# Patient Record
Sex: FEMALE | Race: WHITE | Employment: OTHER | URBAN - METROPOLITAN AREA
[De-identification: names, ages, dates, MRNs, and addresses within clinical notes are randomized per-mention and may not be internally consistent; named-entity substitution may affect disease eponyms.]

---

## 2017-01-01 ENCOUNTER — COMMUNICATION - HEALTHEAST (OUTPATIENT)
Dept: FAMILY MEDICINE | Facility: CLINIC | Age: 67
End: 2017-01-01

## 2017-01-04 ENCOUNTER — COMMUNICATION - HEALTHEAST (OUTPATIENT)
Dept: CARDIOLOGY | Facility: CLINIC | Age: 67
End: 2017-01-04

## 2017-01-04 ENCOUNTER — SURGERY - HEALTHEAST (OUTPATIENT)
Dept: CARDIOLOGY | Facility: CLINIC | Age: 67
End: 2017-01-04

## 2017-01-04 ENCOUNTER — OFFICE VISIT - HEALTHEAST (OUTPATIENT)
Dept: CARDIOLOGY | Facility: CLINIC | Age: 67
End: 2017-01-04

## 2017-01-04 ENCOUNTER — COMMUNICATION - HEALTHEAST (OUTPATIENT)
Dept: FAMILY MEDICINE | Facility: CLINIC | Age: 67
End: 2017-01-04

## 2017-01-04 ENCOUNTER — AMBULATORY - HEALTHEAST (OUTPATIENT)
Dept: CARDIOLOGY | Facility: CLINIC | Age: 67
End: 2017-01-04

## 2017-01-04 DIAGNOSIS — E78.00 PURE HYPERCHOLESTEROLEMIA: ICD-10-CM

## 2017-01-04 DIAGNOSIS — I10 ESSENTIAL HYPERTENSION: ICD-10-CM

## 2017-01-04 DIAGNOSIS — I25.119 CORONARY ARTERY DISEASE INVOLVING NATIVE CORONARY ARTERY OF NATIVE HEART WITH ANGINA PECTORIS (H): ICD-10-CM

## 2017-01-04 ASSESSMENT — MIFFLIN-ST. JEOR: SCORE: 1070.6

## 2017-01-05 ENCOUNTER — SURGERY - HEALTHEAST (OUTPATIENT)
Dept: CARDIOLOGY | Facility: CLINIC | Age: 67
End: 2017-01-05

## 2017-01-05 ASSESSMENT — MIFFLIN-ST. JEOR: SCORE: 1070.6

## 2017-01-09 ENCOUNTER — OFFICE VISIT - HEALTHEAST (OUTPATIENT)
Dept: FAMILY MEDICINE | Facility: CLINIC | Age: 67
End: 2017-01-09

## 2017-01-09 DIAGNOSIS — R94.39 ABNORMAL STRESS TEST: ICD-10-CM

## 2017-01-09 DIAGNOSIS — I10 ESSENTIAL HYPERTENSION: ICD-10-CM

## 2017-01-09 DIAGNOSIS — K21.9 GASTROESOPHAGEAL REFLUX DISEASE, ESOPHAGITIS PRESENCE NOT SPECIFIED: ICD-10-CM

## 2017-01-09 DIAGNOSIS — G52.1 GLOSSOPHARYNGEAL NEURALGIA: ICD-10-CM

## 2017-01-09 DIAGNOSIS — E78.00 PURE HYPERCHOLESTEROLEMIA: ICD-10-CM

## 2017-01-09 ASSESSMENT — MIFFLIN-ST. JEOR: SCORE: 1057

## 2017-01-16 ENCOUNTER — RECORDS - HEALTHEAST (OUTPATIENT)
Dept: ADMINISTRATIVE | Facility: OTHER | Age: 67
End: 2017-01-16

## 2017-01-20 ENCOUNTER — COMMUNICATION - HEALTHEAST (OUTPATIENT)
Dept: ADMINISTRATIVE | Facility: CLINIC | Age: 67
End: 2017-01-20

## 2017-01-26 ENCOUNTER — OFFICE VISIT - HEALTHEAST (OUTPATIENT)
Dept: FAMILY MEDICINE | Facility: CLINIC | Age: 67
End: 2017-01-26

## 2017-01-26 ENCOUNTER — COMMUNICATION - HEALTHEAST (OUTPATIENT)
Dept: FAMILY MEDICINE | Facility: CLINIC | Age: 67
End: 2017-01-26

## 2017-01-26 DIAGNOSIS — G52.1 GLOSSOPHARYNGEAL NEURALGIA: ICD-10-CM

## 2017-01-26 DIAGNOSIS — K21.9 ESOPHAGEAL REFLUX: ICD-10-CM

## 2017-01-26 DIAGNOSIS — E03.9 HYPOTHYROID: ICD-10-CM

## 2017-01-26 DIAGNOSIS — I10 ESSENTIAL HYPERTENSION, BENIGN: ICD-10-CM

## 2017-01-26 ASSESSMENT — MIFFLIN-ST. JEOR: SCORE: 1075.14

## 2017-02-09 ENCOUNTER — COMMUNICATION - HEALTHEAST (OUTPATIENT)
Dept: FAMILY MEDICINE | Facility: CLINIC | Age: 67
End: 2017-02-09

## 2017-02-10 ENCOUNTER — RECORDS - HEALTHEAST (OUTPATIENT)
Dept: ADMINISTRATIVE | Facility: OTHER | Age: 67
End: 2017-02-10

## 2017-02-14 ENCOUNTER — COMMUNICATION - HEALTHEAST (OUTPATIENT)
Dept: FAMILY MEDICINE | Facility: CLINIC | Age: 67
End: 2017-02-14

## 2017-02-14 DIAGNOSIS — K21.9 GASTROESOPHAGEAL REFLUX DISEASE WITHOUT ESOPHAGITIS: ICD-10-CM

## 2017-02-16 ENCOUNTER — COMMUNICATION - HEALTHEAST (OUTPATIENT)
Dept: FAMILY MEDICINE | Facility: CLINIC | Age: 67
End: 2017-02-16

## 2017-02-16 DIAGNOSIS — K21.9 GASTROESOPHAGEAL REFLUX DISEASE WITHOUT ESOPHAGITIS: ICD-10-CM

## 2017-02-18 ENCOUNTER — AMBULATORY - HEALTHEAST (OUTPATIENT)
Dept: FAMILY MEDICINE | Facility: CLINIC | Age: 67
End: 2017-02-18

## 2017-02-27 ENCOUNTER — OFFICE VISIT - HEALTHEAST (OUTPATIENT)
Dept: FAMILY MEDICINE | Facility: CLINIC | Age: 67
End: 2017-02-27

## 2017-02-27 ENCOUNTER — COMMUNICATION - HEALTHEAST (OUTPATIENT)
Dept: FAMILY MEDICINE | Facility: CLINIC | Age: 67
End: 2017-02-27

## 2017-02-27 DIAGNOSIS — E03.9 HYPOTHYROIDISM: ICD-10-CM

## 2017-02-27 ASSESSMENT — MIFFLIN-ST. JEOR: SCORE: 1102.36

## 2017-02-28 ENCOUNTER — COMMUNICATION - HEALTHEAST (OUTPATIENT)
Dept: FAMILY MEDICINE | Facility: CLINIC | Age: 67
End: 2017-02-28

## 2017-03-06 ENCOUNTER — OFFICE VISIT (OUTPATIENT)
Dept: OPHTHALMOLOGY | Facility: CLINIC | Age: 67
End: 2017-03-06
Attending: OPHTHALMOLOGY
Payer: MEDICARE

## 2017-03-06 ENCOUNTER — RECORDS - HEALTHEAST (OUTPATIENT)
Dept: ADMINISTRATIVE | Facility: OTHER | Age: 67
End: 2017-03-06

## 2017-03-06 DIAGNOSIS — H01.00A BLEPHARITIS OF UPPER AND LOWER EYELIDS OF BOTH EYES, UNSPECIFIED TYPE: ICD-10-CM

## 2017-03-06 DIAGNOSIS — H01.00B BLEPHARITIS OF UPPER AND LOWER EYELIDS OF BOTH EYES, UNSPECIFIED TYPE: ICD-10-CM

## 2017-03-06 DIAGNOSIS — H11.823 CONJUNCTIVOCHALASIS, BILATERAL: Primary | ICD-10-CM

## 2017-03-06 PROCEDURE — 99215 OFFICE O/P EST HI 40 MIN: CPT | Mod: ZF

## 2017-03-06 RX ORDER — DOXYCYCLINE 100 MG/1
1 CAPSULE ORAL DAILY
Qty: 30 CAPSULE | Refills: 3 | Status: SHIPPED | OUTPATIENT
Start: 2017-03-06 | End: 2018-01-23

## 2017-03-06 ASSESSMENT — TONOMETRY
OD_IOP_MMHG: 14
IOP_METHOD: ICARE
OS_IOP_MMHG: 12

## 2017-03-06 ASSESSMENT — REFRACTION_WEARINGRX
OS_SPHERE: -1.25
OD_HPRISM: 3 BI
SPECS_TYPE: PAL
OD_ADD: +2.50
OS_AXIS: 168
OS_ADD: +2.50
OS_CYLINDER: +1.25
OD_AXIS: 013
OD_SPHERE: -1.00
OD_CYLINDER: +1.50
OS_HPRISM: 3 BI

## 2017-03-06 ASSESSMENT — VISUAL ACUITY
CORRECTION_TYPE: GLASSES
METHOD: SNELLEN - LINEAR
OS_CC: 20/25
OD_CC: 20/20
OS_CC+: -3
OD_CC+: -1

## 2017-03-06 ASSESSMENT — CONF VISUAL FIELD
OD_NORMAL: 1
OS_NORMAL: 1

## 2017-03-06 NOTE — MR AVS SNAPSHOT
After Visit Summary   3/6/2017    Lissa Lucero    MRN: [id number]           Patient Information     Date Of Birth          1950        Visit Information        Provider Department      3/6/2017 9:45 AM Maik Cuello MD [facility]        Today's Diagnoses     Conjunctivochalasis, bilateral    -  1    Blepharitis of upper and lower eyelids of both eyes, unspecified type           Follow-ups after your visit        Your next 10 appointments already scheduled     Apr 12, 2017  9:45 AM CDT   Post-Op with Maik Cuello MD   Eye Clinic ([facility])    [address]  [address]  [facility] [address]   [phone]            Apr 19, 2017 10:30 AM CDT   Post-Op with Maik Cuello MD   Eye Clinic ([facility])    [address]  [address] [facility]  [address] [address]  [facility] [address]   [phone]              Who to contact     Please call your clinic at [phone] to:    Ask questions about your health    Make or cancel appointments    Discuss your medicines    Learn about your test results    Speak to your doctor   If you have compliments or concerns about an experience at your clinic, or if you wish to file a complaint, please contact [facility] Patient Relations at [phone] or email us at [email or web address]         Additional Information About Your Visit        MyChart Information     GRAVIDIt is an electronic gateway that provides easy, online access to your medical records. With Philo, you can request a clinic appointment, read your test results, renew a prescription or communicate with your care team.     To sign up for GRAVIDIt visit the website at [email or web address]   You will be asked to enter the access code listed below, as well as some personal information. Please follow the directions to create your username and password.     Your access code is:  [email or web address]  Expires: 2017  9:30 AM     Your access code will  in 90 days. If you need help or a new code, please contact your [facility] or call [phone] for assistance.        Care EveryWhere ID     This is your Care EveryWhere ID. This could be used by other organizations to access your [place] medical records  [id number]         Blood Pressure from Last 3 Encounters:   16 118/58   09/14/15 132/66    Weight from Last 3 Encounters:   No data found for Wt              We Performed the Following     Sarai-Operative Worksheet (Ant Seg)          Today's Medication Changes          These changes are accurate as of: 3/6/17 11:59 PM.  If you have any questions, ask your nurse or doctor.               Start taking these medicines.        Dose/Directions    doxycycline Monohydrate 100 MG Caps   Used for:  Conjunctivochalasis, bilateral, Blepharitis of upper and lower eyelids of both eyes, unspecified type   Started by:  Maik Cuello MD        Dose:  1 capsule   Take 1 capsule (100 mg) by mouth daily   Quantity:  30 capsule   Refills:  3         Stop taking these medicines if you haven't already. Please contact your care team if you have questions.     PREVACID PO   Stopped by:  Maik Cuello MD                Where to get your medicines      Some of these will need a paper prescription and others can be bought over the counter.  Ask your nurse if you have questions.     Bring a paper prescription for each of these medications     doxycycline Monohydrate 100 MG Caps                Primary Care Provider Office Phone # Fax #    Linsey Gutiérrez [phone]       [facility] [place] [address] [place] [address]        Thank you!     Thank you for choosing EYE CLINIC  for your care. Our goal is always to provide you with excellent care. Hearing back from our patients is one way we can continue to improve our services. Please  take a few minutes to complete the written survey that you may receive in the mail after your visit with us. Thank you!             Your Updated Medication List - Protect others around you: Learn how to safely use, store and throw away your medicines at www.disposemymeds.org.          This list is accurate as of: 3/6/17 11:59 PM.  Always use your most recent med list.                   Brand Name Dispense Instructions for use    ACETAMINOPHEN PO      Take 325 mg by mouth every 4 hours as needed for pain       ASPIRIN PO      Take 81 mg by mouth daily       doxycycline Monohydrate 100 MG Caps     30 capsule    Take 1 capsule (100 mg) by mouth daily       ESTROPIPATE PO      Take 0.75 mg by mouth daily       FLEXERIL PO      Take 10 mg by mouth as needed for muscle spasms       gabapentin 600 MG tablet    NEURONTIN     Take 1,200 mg by mouth 3 times daily       levothyroxine 112 MCG tablet    SYNTHROID/LEVOTHROID     Take 112 mcg by mouth daily       lidocaine 2 % topical gel    XYLOCAINE     Apply topically as needed for moderate pain       LUTEIN 20 PO      Take by mouth daily       METOPROLOL TARTRATE PO      Take 25 mg by mouth       MULTIVITAMIN PO      Take by mouth daily       NIFEDICAL XL 30 MG 24 hr tablet   Generic drug:  NIFEdipine ER osmotic      Take 30 mg by mouth daily       pantoprazole 40 MG EC tablet    PROTONIX     Take by mouth daily       SIMVASTATIN PO      Take 20 mg by mouth At Bedtime       ZANTAC PO      Take 150 mg by mouth daily

## 2017-03-06 NOTE — LETTER
"3/6/2017       RE: Lissa Lucero  [address] [facility] [place] [address]     Dear Colleague,    Thank you for referring your patient, Lissa Lucero, to the EYE CLINIC at [facility]. Please see a copy of my visit note below.    CC: Referral from Dr. Chau for conjunctivochalasis    Watery eyes and eyes are blurry. Tried Restasis for a couple months in August and that didn't help. Then tried Xiidra and Regenee and neither has helped. However only used Xiidra for 1 week. States that all tear drops only give relief for 30 sec and then its irritated again. C/o pooling of tears in both eyes.     Ocular Medications: Ultra Smoothe prn OU    Patient Ocular History:Sicca, IOL OU, YAG OU, Hx Plugs    A/P:    1. Conjunctiva disorder   2. Epiphora, bilateral   3. Dry eyes   4. Punctate keratitis of both eyes       Plan:     Pt has tried numerous eye drops without relief, although Xiidra course was quite short. Pt was on Regeneret? Eye drops from referring optometrist which cost \"$170 for a bottle.\" At this time her punctum appear patent, good tear film height, with evidence for ocular surface disorder, so I will defer irrigation of NLD at this time  Could consider plasma filtered drops, from [facility]  I rec referral to [facility] for eval of conj redundancy  Pt to use warm compresses twice daily at least to eyelids for 3-5 min duration. Pt also instructed to use an eyelid scrub and to clean eyelids with a q tip or lint-free pad at least Once daily. Pt to use artificial tears Four times a day. I also rec using fish oil supplements. Pt states she is allergic to fish oil PILLS, so I rec increased fish in diet (which she says she can take). Pt advised of chronicity of this condition and need for continued self treatment as directed by the physician.      CC: Referral for conjunctivohalasis both eyes    HPI: 66yo F referred by Dr. Josey Chau  Tears pool in the lower lateral " eyelids  Worsening symptoms with more itchiness, tearing, irritation, burning, blurry vision, occasional redness, crusting. No diplopia, flashes, floaters.  Tried Restasis, Xiidra, no help  Drops don't seem to stay in her eyes, don't seem to help    A/P:    1. Moderate conjunctivochalasis both eyes  2. Posterior blepharitis both eyes  3. Pseudophakia both eyes    Discussed R/B/A of conjunctival excision both eyes with cautery, patient wishes to proceed  Start Doxycycline 100mg po daily  Warm compresses, lid hygiene  Continue preservative artificial tears  Start flax seed oil for blepharitis    ~~~~~~~~~~~~~~~~~~~~~~~~~~~~~~~~~~~~~~~~~~~~~~~~~~~~~~~~~~~~~~~~      Again, thank you for allowing me to participate in the care of your patient.      Sincerely,    Maik Cuello MD

## 2017-03-06 NOTE — NURSING NOTE
"Chief Complaints and History of Present Illnesses   Patient presents with     Consult For     referred by Josey Chau ([facility]) for dry eyes     HPI    Affected eye(s):  Both   Symptoms:     Tearing (Comment: pt describes \"puddling\" in the lower lids)   Dryness         Do you have eye pain now?:  No      Comments:  Pt here for consult for dry eyes - referred by Dr. Josey Chau - María notes avail. in Care Everywhere  Pt notes an irritation upon awakening - \"eyes feel tight\"  Pt was told that her tear ducts weren't open and was told to see Dr. Goel in [place]    Pt has tried Restasis, Xiidra, and Regenerate  Pt uses preservative-free ultra soothee drops but doesn't feel that these help    Sasha DE SOUZA 9:52 AM March 6, 2017              "

## 2017-03-06 NOTE — Clinical Note
"3/6/2017       RE: Lissa Lucero  [address] [facility] [place] [address]     Dear Colleague,    Thank you for referring your patient, Lissa Lucero, to the EYE CLINIC at [facility]. Please see a copy of my visit note below.    CC: Referral from Dr. Chau for conjunctivochalasis    Watery eyes and eyes are blurry. Tried Restasis for a couple months in August and that didn't help. Then tried Xiidra and Regenee and neither has helped. However only used Xiidra for 1 week. States that all tear drops only give relief for 30 sec and then its irritated again. C/o pooling of tears in both eyes.     Ocular Medications: Ultra Smoothe prn OU    Patient Ocular History:Sicca, IOL OU, YAG OU, Hx Plugs    A/P:    1. Conjunctiva disorder   2. Epiphora, bilateral   3. Dry eyes   4. Punctate keratitis of both eyes       Plan:     Pt has tried numerous eye drops without relief, although Xiidra course was quite short. Pt was on Regeneret? Eye drops from referring optometrist which cost \"$170 for a bottle.\" At this time her punctum appear patent, good tear film height, with evidence for ocular surface disorder, so I will defer irrigation of NLD at this time  Could consider plasma filtered drops, from [facility]  I rec referral to [facility] for eval of conj redundancy  Pt to use warm compresses twice daily at least to eyelids for 3-5 min duration. Pt also instructed to use an eyelid scrub and to clean eyelids with a q tip or lint-free pad at least Once daily. Pt to use artificial tears Four times a day. I also rec using fish oil supplements. Pt states she is allergic to fish oil PILLS, so I rec increased fish in diet (which she says she can take). Pt advised of chronicity of this condition and need for continued self treatment as directed by the physician.      CC: Referral for conjunctivohalasis both eyes    Tears pool in the lower lateral eyelids  Worsening symptoms  Tried Restasis, " Xiidra, no help  Drops don't seem to stay in her eyes    A/P:    1. Mild conjunctivochalasis both eyes  2. Posterior blepharitis both eyes  3. Pseudophakia both eyes    May consider conjunctival excision both eyes  May consider doxycycline 100 mg daily  Warm compresses, lid hygiene           Again, thank you for allowing me to participate in the care of your patient.      Sincerely,    Maik Cuello MD

## 2017-03-06 NOTE — PROGRESS NOTES
"CC: Referral from Dr. Chau for conjunctivochalasis    Watery eyes and eyes are blurry. Tried Restasis for a couple months in August and that didn't help. Then tried Xiidra and Regenee and neither has helped. However only used Xiidra for 1 week. States that all tear drops only give relief for 30 sec and then its irritated again. C/o pooling of tears in both eyes.     Ocular Medications: Ultra Smoothe prn OU    Patient Ocular History:Sicca, IOL OU, YAG OU, Hx Plugs    A/P:    1. Conjunctiva disorder   2. Epiphora, bilateral   3. Dry eyes   4. Punctate keratitis of both eyes       Plan:     Pt has tried numerous eye drops without relief, although Xiidra course was quite short. Pt was on Regeneret? Eye drops from referring optometrist which cost \"$170 for a bottle.\" At this time her punctum appear patent, good tear film height, with evidence for ocular surface disorder, so I will defer irrigation of NLD at this time  Could consider plasma filtered drops, from [facility]  I rec referral to [facility] for eval of conj redundancy  Pt to use warm compresses twice daily at least to eyelids for 3-5 min duration. Pt also instructed to use an eyelid scrub and to clean eyelids with a q tip or lint-free pad at least Once daily. Pt to use artificial tears Four times a day. I also rec using fish oil supplements. Pt states she is allergic to fish oil PILLS, so I rec increased fish in diet (which she says she can take). Pt advised of chronicity of this condition and need for continued self treatment as directed by the physician.    "

## 2017-03-06 NOTE — PROGRESS NOTES
CC: Referral for conjunctivohalasis both eyes    HPI: 66yo F referred by Dr. Josey Chau  Tears pool in the lower lateral eyelids  Worsening symptoms with more itchiness, tearing, irritation, burning, blurry vision, occasional redness, crusting. No diplopia, flashes, floaters.  Tried Restasis, Xiidra, no help  Drops don't seem to stay in her eyes, don't seem to help    A/P:    1. Moderate conjunctivochalasis both eyes  2. Posterior blepharitis both eyes  3. Pseudophakia both eyes    Discussed R/B/A of conjunctival excision both eyes with cautery, patient wishes to proceed  Start Doxycycline 100mg po daily  Warm compresses, lid hygiene  Continue preservative artificial tears  Start flax seed oil for blepharitis    ~~~~~~~~~~~~~~~~~~~~~~~~~~~~~~~~~~~~~~~~~~~~~~~~~~~~~~~~~~~~~~~~    Complete documentation of historical and exam elements from today's encounter can be found in the full encounter summary report (not reduplicated in this progress note). I personally obtained the chief complaint(s) and history of present illness.  I confirmed and edited as necessary the review of systems, past medical/surgical history, family history, social history, and examination findings as documented by others; and I examined the patient myself. I personally reviewed the relevant tests, images, and reports as documented above. I formulated and edited as necessary the assessment and plan and discussed the findings and management plan with the patient and family.    Maik Cuello MD

## 2017-03-21 ENCOUNTER — COMMUNICATION - HEALTHEAST (OUTPATIENT)
Dept: ADMINISTRATIVE | Facility: CLINIC | Age: 67
End: 2017-03-21

## 2017-03-30 ENCOUNTER — OFFICE VISIT - HEALTHEAST (OUTPATIENT)
Dept: FAMILY MEDICINE | Facility: CLINIC | Age: 67
End: 2017-03-30

## 2017-03-30 DIAGNOSIS — I10 ESSENTIAL HYPERTENSION, BENIGN: ICD-10-CM

## 2017-03-30 DIAGNOSIS — H11.829 CONJUNCTIVOCHALASIS: ICD-10-CM

## 2017-03-30 DIAGNOSIS — G52.1 GLOSSOPHARYNGEAL NEURALGIA: ICD-10-CM

## 2017-03-30 DIAGNOSIS — Z01.818 PREOP EXAMINATION: ICD-10-CM

## 2017-03-30 ASSESSMENT — MIFFLIN-ST. JEOR: SCORE: 1129.57

## 2017-04-06 ENCOUNTER — OFFICE VISIT - HEALTHEAST (OUTPATIENT)
Dept: PODIATRY | Age: 67
End: 2017-04-06

## 2017-04-06 ENCOUNTER — COMMUNICATION - HEALTHEAST (OUTPATIENT)
Dept: FAMILY MEDICINE | Facility: CLINIC | Age: 67
End: 2017-04-06

## 2017-04-06 DIAGNOSIS — M19.079 ARTHRITIS, MIDFOOT: ICD-10-CM

## 2017-04-06 ASSESSMENT — MIFFLIN-ST. JEOR: SCORE: 1129.57

## 2017-04-11 ENCOUNTER — TRANSFERRED RECORDS (OUTPATIENT)
Dept: HEALTH INFORMATION MANAGEMENT | Facility: CLINIC | Age: 67
End: 2017-04-11

## 2017-04-12 ENCOUNTER — OFFICE VISIT (OUTPATIENT)
Dept: OPHTHALMOLOGY | Facility: CLINIC | Age: 67
End: 2017-04-12
Attending: OPHTHALMOLOGY
Payer: MEDICARE

## 2017-04-12 DIAGNOSIS — H11.823 CONJUNCTIVOCHALASIS, BILATERAL: Primary | ICD-10-CM

## 2017-04-12 DIAGNOSIS — H01.00B BLEPHARITIS OF UPPER AND LOWER EYELIDS OF BOTH EYES, UNSPECIFIED TYPE: ICD-10-CM

## 2017-04-12 DIAGNOSIS — H01.00A BLEPHARITIS OF UPPER AND LOWER EYELIDS OF BOTH EYES, UNSPECIFIED TYPE: ICD-10-CM

## 2017-04-12 PROCEDURE — 99212 OFFICE O/P EST SF 10 MIN: CPT | Mod: ZF

## 2017-04-12 ASSESSMENT — VISUAL ACUITY
OD_CC+: -2
CORRECTION_TYPE: GLASSES
OS_CC: 20/50
OD_CC: 20/25
METHOD: SNELLEN - LINEAR

## 2017-04-12 ASSESSMENT — TONOMETRY
IOP_METHOD: ICARE
OD_IOP_MMHG: 10
OS_IOP_MMHG: 11

## 2017-04-12 NOTE — NURSING NOTE
Chief Complaints and History of Present Illnesses   Patient presents with     Follow Up For     S/P Conjunctival Excision BE 1 day     HPI    Last Eye Exam:  3/6/17   Affected eye(s):  Both   Symptoms:     Blurred vision   Floaters (Comment: Noticed a few wavy lines in vision this morning, cleared up after the matter in eyes cleared up. )   No flashes   Redness   Itching   Photophobia   Eye discharge      Duration:  1 day      Do you have eye pain now?:  Yes   Location:  OU   Pain Level:  Severe Pain (7), Extreme Pain (8)   Other:  Pressure feeling with a bad head ache,   Pain Frequency:  Intermittent      Comments:  Pt slept well last night. In some pain and discomfort today, would rate it a 7-8 today. Complains of a pressure feeling. Vision is blurry today. MD please review post operative instructions with pt today. MAHESH SHEA, COA 10:07 AM 04/12/2017

## 2017-04-12 NOTE — MR AVS SNAPSHOT
After Visit Summary   2017    Lissa Lucero    MRN: [id number]           Patient Information     Date Of Birth          1950        Visit Information        Provider Department      2017 9:45 AM Maik Cuello MD [facility]        Today's Diagnoses     Conjunctivochalasis, bilateral    -  1    Blepharitis of upper and lower eyelids of both eyes, unspecified type           Follow-ups after your visit        Your next 10 appointments already scheduled     2017 10:30 AM CDT   Post-Op with Maik Cuello MD   Eye Clinic ([facility])    [address]  [address] [facility]  [address] Clin [address]  [facility] [address] [phone]              Who to contact     Please call your clinic at [phone] to:    Ask questions about your health    Make or cancel appointments    Discuss your medicines    Learn about your test results    Speak to your doctor   If you have compliments or concerns about an experience at your clinic, or if you wish to file a complaint, please contact [facility] Patient Relations at [phone] or email us at [email or web address]         Additional Information About Your Visit        MyChart Information     Swing by Swinghart is an electronic gateway that provides easy, online access to your medical records. With Axentis Software, you can request a clinic appointment, read your test results, renew a prescription or communicate with your care team.     To sign up for KidAdmitt visit the website at [email or web address]   You will be asked to enter the access code listed below, as well as some personal information. Please follow the directions to create your username and password.     Your access code is: [email or web address]  Expires: 2017  9:30 AM     Your access code will  in 90 days. If you need help or a new code, please contact your [facility] or call [phone] for  assistance.        Care EveryWhere ID     This is your Care EveryWhere ID. This could be used by other organizations to access your [place] medical records  [id number]         Blood Pressure from Last 3 Encounters:   01/07/16 118/58   09/14/15 132/66    Weight from Last 3 Encounters:   No data found for Wt              Today, you had the following     No orders found for display       Primary Care Provider Office Phone # Fax #    Linsey Gutiérrez [phone]       [facility] [place] [address] [place] [address]        Thank you!     Thank you for choosing EYE CLINIC  for your care. Our goal is always to provide you with excellent care. Hearing back from our patients is one way we can continue to improve our services. Please take a few minutes to complete the written survey that you may receive in the mail after your visit with us. Thank you!             Your Updated Medication List - Protect others around you: Learn how to safely use, store and throw away your medicines at www.disposemymeds.org.          This list is accurate as of: 4/12/17 11:55 AM.  Always use your most recent med list.                   Brand Name Dispense Instructions for use    ACETAMINOPHEN PO      Take 325 mg by mouth every 4 hours as needed for pain       ASPIRIN PO      Take 81 mg by mouth daily       doxycycline Monohydrate 100 MG Caps     30 capsule    Take 1 capsule (100 mg) by mouth daily       ESTROPIPATE PO      Take 0.75 mg by mouth daily       FLEXERIL PO      Take 10 mg by mouth as needed for muscle spasms       gabapentin 600 MG tablet    NEURONTIN     Take 1,200 mg by mouth 3 times daily       levothyroxine 112 MCG tablet    SYNTHROID/LEVOTHROID     Take 112 mcg by mouth daily       lidocaine 2 % topical gel    XYLOCAINE     Apply topically as needed for moderate pain       LUTEIN 20 PO      Take by mouth daily       METOPROLOL TARTRATE PO      Take 25 mg by mouth       MULTIVITAMIN PO       Take by mouth daily       NIFEDICAL XL 30 MG 24 hr tablet   Generic drug:  NIFEdipine ER osmotic      Take 30 mg by mouth daily       pantoprazole 40 MG EC tablet    PROTONIX     Take by mouth daily       SIMVASTATIN PO      Take 20 mg by mouth At Bedtime       ZANTAC PO      Take 150 mg by mouth daily

## 2017-04-12 NOTE — PROGRESS NOTES
CC: Referral for conjunctivohalasis both eyes    HPI: 68yo F referred by Dr. Josey Chau evaluated for dryness and conjunctivochalasis.  Tried Restasis, Xiidra, no help  Drops don't seem to stay in her eyes, don't seem to help    Now POD#1 s/p Conjunctivochalasis excision OU    POHx:  S/p conjunctivochalasis excision OU 4/11/17    A/P:    1. Moderate conjunctivochalasis both eyes s/p excision OU  - doing well  - continue PF AT Q1H OU  - eye shields at night  - PF QID OU  - Ofloxacin QID OU  - edu post-op precautions  - continue flax seed oil  - f/u 1 week    2. Posterior blepharitis both eyes    3. Pseudophakia both eyes    ~~~~~~~~~~~~~~~~~~~~~~~~~~~~~~~~~~~~~~~~~~~~~~~~~~~~~~~~~~~~~~~~    Complete documentation of historical and exam elements from today's encounter can be found in the full encounter summary report (not reduplicated in this progress note). I personally obtained the chief complaint(s) and history of present illness.  I confirmed and edited as necessary the review of systems, past medical/surgical history, family history, social history, and examination findings as documented by others; and I examined the patient myself. I personally reviewed the relevant tests, images, and reports as documented above. I formulated and edited as necessary the assessment and plan and discussed the findings and management plan with the patient and family.    Maik Cuello MD

## 2017-04-19 ENCOUNTER — OFFICE VISIT (OUTPATIENT)
Dept: OPHTHALMOLOGY | Facility: CLINIC | Age: 67
End: 2017-04-19
Attending: OPHTHALMOLOGY
Payer: MEDICARE

## 2017-04-19 ENCOUNTER — TELEPHONE (OUTPATIENT)
Dept: OPHTHALMOLOGY | Facility: CLINIC | Age: 67
End: 2017-04-19

## 2017-04-19 DIAGNOSIS — H11.823 CONJUNCTIVOCHALASIS, BILATERAL: ICD-10-CM

## 2017-04-19 DIAGNOSIS — Z98.890 POST-OPERATIVE STATE: Primary | ICD-10-CM

## 2017-04-19 PROCEDURE — 99213 OFFICE O/P EST LOW 20 MIN: CPT | Mod: ZF

## 2017-04-19 RX ORDER — OFLOXACIN 3 MG/ML
1 SOLUTION/ DROPS OPHTHALMIC 4 TIMES DAILY
Qty: 2 ML | Refills: 0 | Status: SHIPPED | OUTPATIENT
Start: 2017-04-19 | End: 2017-04-26

## 2017-04-19 ASSESSMENT — VISUAL ACUITY
METHOD: SNELLEN - LINEAR
OS_CC: 20/40
OD_CC: 20/30-2
CORRECTION_TYPE: GLASSES
OD_PH_CC: 20/30-1

## 2017-04-19 ASSESSMENT — TONOMETRY
OS_IOP_MMHG: 14
IOP_METHOD: ICARE
OD_IOP_MMHG: 13

## 2017-04-19 NOTE — PROGRESS NOTES
CC: post op     HPI: 66yo F referred by Dr. Josey Chau evaluated for dryness and conjunctivochalasis.  Tried Restasis, Xiidra, no help  Drops don't seem to stay in her eyes, don't seem to help    Now POW#1 s/p Conjunctivochalasis excision OU    POHx:  S/p conjunctivochalasis excision OU 4/11/17    A/P:    1. Moderate conjunctivochalasis both eyes s/p excision OU  - doing well  - mild K edema  - conj well healed, better tear film   - d/c kontour lenses   - remove residual vicryl sutures    - she currently uses AT [id number]; not PF - change to PF AT  - continue PF QID OU x 1 week, then decrease to TID x 1 week, then BID x 1 week, then q day x 1 week then off   - continue ofloxacin QID x 1 week then stop  - continue flax seed oil    2. Posterior blepharitis both eyes    3. Pseudophakia both eyes    RTC 3-4 weeks.     Ander Shea MD  Ophthalmology resident, PGY-3      ~~~~~~~~~~~~~~~~~~~~~~~~~~~~~~~~~~~~~~~~~~~~~~~~~~~~~~~~~~~~~~~~    Complete documentation of historical and exam elements from today's encounter can be found in the full encounter summary report (not reduplicated in this progress note). I personally obtained the chief complaint(s) and history of present illness.  I confirmed and edited as necessary the review of systems, past medical/surgical history, family history, social history, and examination findings as documented by others; and I examined the patient myself. I personally reviewed the relevant tests, images, and reports as documented above. I formulated and edited as necessary the assessment and plan and discussed the findings and management plan with the patient and family.    Maik Cuello MD

## 2017-04-19 NOTE — TELEPHONE ENCOUNTER
Patient had Kontour lens and suture removal in the eye clinic today. Calling with increased irritation in her left eye. Recommended trying Refresh PM lubricating ointment to see if her symptoms improve. She will call back if she continues to have issues.     Brad Ocasio MD PGY-3  Ophthalmology

## 2017-04-19 NOTE — MR AVS SNAPSHOT
After Visit Summary   2017    Lissa Lucero    MRN: [id number]           Patient Information     Date Of Birth          1950        Visit Information        Provider Department      2017 10:30 AM Ma[facility] Cuello MD [facility]        Today's Diagnoses     Post-operative state    -  1    Conjunctivochalasis, bilateral           Follow-ups after your visit        Your next 10 appointments already scheduled     May 22, 2017  1:15 PM CDT   RETURN CORNEA with Maik Cuello MD   Eye Clinic ([facility])    [address]  [address]  [address]  [facility] [address]   [phone]              Who to contact     Please call your clinic at [phone] to:    Ask questions about your health    Make or cancel appointments    Discuss your medicines    Learn about your test results    Speak to your doctor   If you have compliments or concerns about an experience at your clinic, or if you wish to file a complaint, please contact [facility] Patient Relations at [phone] or email us at [email or web address]         Additional Information About Your Visit        MyChart Information     [employer] is an electronic gateway that provides easy, online access to your medical records. With [employer], you can request a clinic appointment, read your test results, renew a prescription or communicate with your care team.     To sign up for [employer] visit the website at [email or web address]   You will be asked to enter the access code listed below, as well as some personal information. Please follow the directions to create your username and password.     Your access code is: [email or web address]  Expires: 2017  9:30 AM     Your access code will  in 90 days. If you need help or a new code, please contact your [facility] or call [phone] for assistance.        Care EveryWhere ID     This  is your Care EveryWhere ID. This could be used by other organizations to access your [place] medical records  [id number]         Blood Pressure from Last 3 Encounters:   01/07/16 118/58   09/14/15 132/66    Weight from Last 3 Encounters:   No data found for Wt              Today, you had the following     No orders found for display         Today's Medication Changes          These changes are accurate as of: 4/19/17  8:19 PM.  If you have any questions, ask your nurse or doctor.               Start taking these medicines.        Dose/Directions    ofloxacin 0.3 % ophthalmic solution   Commonly known as:  OCUFLOX   Used for:  Post-operative state   Started by:  Maik Cuello MD        Dose:  1 drop   Place 1 drop into both eyes 4 times daily for 7 days   Quantity:  2 mL   Refills:  0            Where to get your medicines      These medications were sent to [id number] - [place], [place] - [address] AT [facility] [address]  [address], [place] [address]    Hours:  called pharm.  they do accept faxes Phone:  [phone]     ofloxacin 0.3 % ophthalmic solution                Primary Care Provider Office Phone # Fax #    Linsey NITIN Gutiérrez [phone]       [facility] [place] [address] [place] [address]        Thank you!     Thank you for choosing EYE CLINIC  for your care. Our goal is always to provide you with excellent care. Hearing back from our patients is one way we can continue to improve our services. Please take a few minutes to complete the written survey that you may receive in the mail after your visit with us. Thank you!             Your Updated Medication List - Protect others around you: Learn how to safely use, store and throw away your medicines at www.disposemymeds.org.          This list is accurate as of: 4/19/17  8:19 PM.  Always use your most recent med list.                   Brand Name  Dispense Instructions for use    ACETAMINOPHEN PO      Take 325 mg by mouth every 4 hours as needed for pain       ASPIRIN PO      Take 81 mg by mouth daily       doxycycline Monohydrate 100 MG Caps     30 capsule    Take 1 capsule (100 mg) by mouth daily       ESTROPIPATE PO      Take 0.75 mg by mouth daily       FLEXERIL PO      Take 10 mg by mouth as needed for muscle spasms       gabapentin 600 MG tablet    NEURONTIN     Take 1,200 mg by mouth 3 times daily       levothyroxine 112 MCG tablet    SYNTHROID/LEVOTHROID     Take 112 mcg by mouth daily       lidocaine 2 % topical gel    XYLOCAINE     Apply topically as needed for moderate pain       LUTEIN 20 PO      Take by mouth daily       METOPROLOL TARTRATE PO      Take 25 mg by mouth       MULTIVITAMIN PO      Take by mouth daily       NIFEDICAL XL 30 MG 24 hr tablet   Generic drug:  NIFEdipine ER osmotic      Take 30 mg by mouth daily       ofloxacin 0.3 % ophthalmic solution    OCUFLOX    2 mL    Place 1 drop into both eyes 4 times daily for 7 days       pantoprazole 40 MG EC tablet    PROTONIX     Take by mouth daily       SIMVASTATIN PO      Take 20 mg by mouth At Bedtime       ZANTAC PO      Take 150 mg by mouth daily

## 2017-04-19 NOTE — NURSING NOTE
Chief Complaints and History of Present Illnesses   Patient presents with     Post Op (Ophthalmology) Both Eyes     s/p Conjunctivochalasis excision OU     HPI    Affected eye(s):  Both   Symptoms:           Do you have eye pain now?:  No      Comments:  Has been using AT as often as possible. These are not preservative free. She has refresh and soothe.  Using pred and ofloxacin qid. Hard time focusing. Less dry. BE are very irritated still.     Alicia Farrar COA April 19, 2017 10:50 AM

## 2017-04-20 ENCOUNTER — RECORDS - HEALTHEAST (OUTPATIENT)
Dept: ADMINISTRATIVE | Facility: OTHER | Age: 67
End: 2017-04-20

## 2017-04-20 ENCOUNTER — COMMUNICATION - HEALTHEAST (OUTPATIENT)
Dept: ADMINISTRATIVE | Facility: CLINIC | Age: 67
End: 2017-04-20

## 2017-04-24 ENCOUNTER — COMMUNICATION - HEALTHEAST (OUTPATIENT)
Dept: FAMILY MEDICINE | Facility: CLINIC | Age: 67
End: 2017-04-24

## 2017-04-26 ENCOUNTER — COMMUNICATION - HEALTHEAST (OUTPATIENT)
Dept: FAMILY MEDICINE | Facility: CLINIC | Age: 67
End: 2017-04-26

## 2017-04-27 ENCOUNTER — COMMUNICATION - HEALTHEAST (OUTPATIENT)
Dept: FAMILY MEDICINE | Facility: CLINIC | Age: 67
End: 2017-04-27

## 2017-05-08 ENCOUNTER — OFFICE VISIT - HEALTHEAST (OUTPATIENT)
Dept: FAMILY MEDICINE | Facility: CLINIC | Age: 67
End: 2017-05-08

## 2017-05-08 ENCOUNTER — COMMUNICATION - HEALTHEAST (OUTPATIENT)
Dept: FAMILY MEDICINE | Facility: CLINIC | Age: 67
End: 2017-05-08

## 2017-05-08 ENCOUNTER — COMMUNICATION - HEALTHEAST (OUTPATIENT)
Dept: TELEHEALTH | Facility: CLINIC | Age: 67
End: 2017-05-08

## 2017-05-08 DIAGNOSIS — I10 ESSENTIAL HYPERTENSION, BENIGN: ICD-10-CM

## 2017-05-08 DIAGNOSIS — I25.119 CORONARY ARTERY DISEASE INVOLVING NATIVE CORONARY ARTERY OF NATIVE HEART WITH ANGINA PECTORIS (H): ICD-10-CM

## 2017-05-08 DIAGNOSIS — M19.071 OSTEOARTHRITIS OF RIGHT FOOT: ICD-10-CM

## 2017-05-08 DIAGNOSIS — Z01.818 PREOP EXAMINATION: ICD-10-CM

## 2017-05-08 ASSESSMENT — MIFFLIN-ST. JEOR: SCORE: 1152.25

## 2017-05-09 ENCOUNTER — COMMUNICATION - HEALTHEAST (OUTPATIENT)
Dept: FAMILY MEDICINE | Facility: CLINIC | Age: 67
End: 2017-05-09

## 2017-05-09 ENCOUNTER — AMBULATORY - HEALTHEAST (OUTPATIENT)
Dept: FAMILY MEDICINE | Facility: CLINIC | Age: 67
End: 2017-05-09

## 2017-05-09 DIAGNOSIS — M89.9 DISORDER OF BONE: ICD-10-CM

## 2017-05-09 DIAGNOSIS — M85.80 OSTEOPENIA: ICD-10-CM

## 2017-05-09 DIAGNOSIS — E78.00 PURE HYPERCHOLESTEROLEMIA: ICD-10-CM

## 2017-05-09 DIAGNOSIS — M85.9 DISORDER OF BONE DENSITY AND STRUCTURE, UNSPECIFIED: ICD-10-CM

## 2017-05-15 ENCOUNTER — OFFICE VISIT (OUTPATIENT)
Dept: OPHTHALMOLOGY | Facility: CLINIC | Age: 67
End: 2017-05-15
Attending: OPHTHALMOLOGY
Payer: MEDICARE

## 2017-05-15 DIAGNOSIS — H01.00B BLEPHARITIS OF UPPER AND LOWER EYELIDS OF BOTH EYES, UNSPECIFIED TYPE: ICD-10-CM

## 2017-05-15 DIAGNOSIS — Z98.890 POST-OPERATIVE STATE: Primary | ICD-10-CM

## 2017-05-15 DIAGNOSIS — H01.00A BLEPHARITIS OF UPPER AND LOWER EYELIDS OF BOTH EYES, UNSPECIFIED TYPE: ICD-10-CM

## 2017-05-15 DIAGNOSIS — H11.823 CONJUNCTIVOCHALASIS, BILATERAL: ICD-10-CM

## 2017-05-15 PROCEDURE — 99212 OFFICE O/P EST SF 10 MIN: CPT | Mod: ZF

## 2017-05-15 RX ORDER — CYCLOSPORINE 0.5 MG/ML
1 EMULSION OPHTHALMIC 2 TIMES DAILY
COMMUNITY
End: 2023-03-23

## 2017-05-15 ASSESSMENT — REFRACTION_WEARINGRX
OS_ADD: +2.50
OS_CYLINDER: +1.25
SPECS_TYPE: PAL
OD_SPHERE: -1.00
OS_SPHERE: -1.25
OD_CYLINDER: +1.50
OD_ADD: +2.50
OS_AXIS: 168
OD_HPRISM: 3 BI
OD_AXIS: 013
OS_HPRISM: 3 BI

## 2017-05-15 ASSESSMENT — VISUAL ACUITY
OD_CC+: -2
OS_CC: 20/20
METHOD: SNELLEN - LINEAR
OD_CC: 20/20

## 2017-05-15 ASSESSMENT — CONF VISUAL FIELD
METHOD: COUNTING FINGERS
OS_NORMAL: 1
OD_NORMAL: 1

## 2017-05-15 ASSESSMENT — TONOMETRY
IOP_METHOD: TONOPEN
OD_IOP_MMHG: 9
OS_IOP_MMHG: 7

## 2017-05-15 NOTE — NURSING NOTE
Chief Complaints and History of Present Illnesses   Patient presents with     Follow Up For     4 week follow up conjunctivochalasis both eyes, s/p excision OU     HPI    Affected eye(s):  Both   Symptoms:     (Comment: Vision is great BE.)   No floaters   No flashes   Redness (Comment: BE)   Tearing (Comment: BE)   Itching (Comment: BE)         Do you have eye pain now?:  No      Comments:  4 week follow up conjunctivochalasis both eyes, s/p excision OU. Patient ordered new glasses about 7-10 days ago, They have prism in them.    Wesley DE SOUZA  12:21 PM05/15/2017

## 2017-05-15 NOTE — MR AVS SNAPSHOT
After Visit Summary   5/15/2017    Lissa Lucero    MRN: [id number]           Patient Information     Date Of Birth          1950        Visit Information        Provider Department      5/15/2017 12:45 PM Ma[facility] Cuello MD [facility]        Today's Diagnoses     Post-operative state    -  1    Blepharitis of upper and lower eyelids of both eyes, unspecified type        Conjunctivochalasis, bilateral           Follow-ups after your visit        Your next 10 appointments already scheduled     Aug 16, 2017 10:15 AM CDT   RETURN CORNEA with Maik Cuello MD   Eye Clinic ([facility])    [address]  [address]  [address]  [facility] [address]   [phone]              Who to contact     Please call your clinic at [phone] to:    Ask questions about your health    Make or cancel appointments    Discuss your medicines    Learn about your test results    Speak to your doctor   If you have compliments or concerns about an experience at your clinic, or if you wish to file a complaint, please contact [facility] Patient Relations at [phone] or email us at [email or web address]         Additional Information About Your Visit        MyChart Information     [employer] One[employer] is an electronic gateway that provides easy, online access to your medical records. With [employer] you can request a clinic appointment, read your test results, renew a prescription or communicate with your care team.     To sign up for [employer] visit the website at [email or web address]   You will be asked to enter the access code listed below, as well as some personal information. Please follow the directions to create your username and password.     Your access code is: [email or web address]  Expires: 2017  9:30 AM     Your access code will  in 90 days. If you need help or a new code, please contact your [facility]  Clinic or call [phone] for assistance.        Care EveryWhere ID     This is your Care EveryWhere ID. This could be used by other organizations to access your [place] medical records  [id number]         Blood Pressure from Last 3 Encounters:   01/07/16 118/58   09/14/15 132/66    Weight from Last 3 Encounters:   No data found for Wt              Today, you had the following     No orders found for display       Primary Care Provider Office Phone # Fax #    Linsey Gutiérrez [phone]       [facility] [place] [address] [place] [address]        Thank you!     Thank you for choosing EYE CLINIC  for your care. Our goal is always to provide you with excellent care. Hearing back from our patients is one way we can continue to improve our services. Please take a few minutes to complete the written survey that you may receive in the mail after your visit with us. Thank you!             Your Updated Medication List - Protect others around you: Learn how to safely use, store and throw away your medicines at www.disposemymeds.org.          This list is accurate as of: 5/15/17  2:17 PM.  Always use your most recent med list.                   Brand Name Dispense Instructions for use    ACETAMINOPHEN PO      Take 325 mg by mouth every 4 hours as needed for pain       ASPIRIN PO      Take 81 mg by mouth daily       cycloSPORINE 0.05 % ophthalmic emulsion    RESTASIS     Place 1 drop into both eyes 2 times daily       doxycycline Monohydrate 100 MG Caps     30 capsule    Take 1 capsule (100 mg) by mouth daily       ESTROPIPATE PO      Take 0.75 mg by mouth daily       FLEXERIL PO      Take 10 mg by mouth as needed for muscle spasms       gabapentin 600 MG tablet    NEURONTIN     Take 1,200 mg by mouth 3 times daily       levothyroxine 112 MCG tablet    SYNTHROID/LEVOTHROID     Take 112 mcg by mouth daily       lidocaine 2 % topical gel    XYLOCAINE     Apply topically as needed for  moderate pain       LUTEIN 20 PO      Take by mouth daily       METOPROLOL TARTRATE PO      Take 25 mg by mouth       MULTIVITAMIN PO      Take by mouth daily       NIFEDICAL XL 30 MG 24 hr tablet   Generic drug:  NIFEdipine ER osmotic      Take 30 mg by mouth daily       pantoprazole 40 MG EC tablet    PROTONIX     Take by mouth daily       SIMVASTATIN PO      Take 20 mg by mouth At Bedtime       ZANTAC PO      Take 150 mg by mouth daily

## 2017-05-15 NOTE — PROGRESS NOTES
CC: post op     HPI: 68yo F referred by Dr. Josey Chau evaluated for dryness and conjunctivochalasis.    Now POM#1  s/p Conjunctivochalasis excision OU    Doing really well per patient. Eyes comfortable. Vision great.     POHx:  S/p conjunctivochalasis excision OU 4/11/17    Gtts:   Restasis BID OU   AT PRN (2-3x/day)    A/P:    1. Moderate conjunctivochalasis both eyes s/p excision OU 4/11/17  - doing well  - conj well healed, better tear film   - encouraged using PFAT PRN   - continue flax seed oil    2. Posterior blepharitis both eyes    3. Pseudophakia both eyes    RTC 3 months.     Ander Shea MD  Ophthalmology resident, PGY-3    ~~~~~~~~~~~~~~~~~~~~~~~~~~~~~~~~~~~~~~~~~~~~~~~~~~~~~~~~~~~~~~~~    Complete documentation of historical and exam elements from today's encounter can be found in the full encounter summary report (not reduplicated in this progress note). I personally obtained the chief complaint(s) and history of present illness.  I confirmed and edited as necessary the review of systems, past medical/surgical history, family history, social history, and examination findings as documented by others; and I examined the patient myself. I personally reviewed the relevant tests, images, and reports as documented above. I formulated and edited as necessary the assessment and plan and discussed the findings and management plan with the patient and family.    Maik Cuello MD

## 2017-05-18 ASSESSMENT — MIFFLIN-ST. JEOR: SCORE: 1152.25

## 2017-05-19 ENCOUNTER — ANESTHESIA - HEALTHEAST (OUTPATIENT)
Dept: SURGERY | Facility: HOSPITAL | Age: 67
End: 2017-05-19

## 2017-05-19 ENCOUNTER — SURGERY - HEALTHEAST (OUTPATIENT)
Dept: SURGERY | Facility: HOSPITAL | Age: 67
End: 2017-05-19

## 2017-05-21 ENCOUNTER — COMMUNICATION - HEALTHEAST (OUTPATIENT)
Dept: SCHEDULING | Facility: CLINIC | Age: 67
End: 2017-05-21

## 2017-05-25 ENCOUNTER — OFFICE VISIT - HEALTHEAST (OUTPATIENT)
Dept: PODIATRY | Age: 67
End: 2017-05-25

## 2017-05-25 DIAGNOSIS — M19.079 ARTHRITIS, MIDFOOT: ICD-10-CM

## 2017-06-01 ENCOUNTER — AMBULATORY - HEALTHEAST (OUTPATIENT)
Dept: PODIATRY | Age: 67
End: 2017-06-01

## 2017-06-01 ENCOUNTER — RECORDS - HEALTHEAST (OUTPATIENT)
Dept: GENERAL RADIOLOGY | Facility: CLINIC | Age: 67
End: 2017-06-01

## 2017-06-01 ENCOUNTER — OFFICE VISIT - HEALTHEAST (OUTPATIENT)
Dept: PODIATRY | Age: 67
End: 2017-06-01

## 2017-06-01 DIAGNOSIS — M19.90 UNSPECIFIED OSTEOARTHRITIS, UNSPECIFIED SITE: ICD-10-CM

## 2017-06-01 DIAGNOSIS — M19.079 ARTHRITIS, MIDFOOT: ICD-10-CM

## 2017-06-05 ENCOUNTER — COMMUNICATION - HEALTHEAST (OUTPATIENT)
Dept: FAMILY MEDICINE | Facility: CLINIC | Age: 67
End: 2017-06-05

## 2017-06-15 ENCOUNTER — OFFICE VISIT - HEALTHEAST (OUTPATIENT)
Dept: PODIATRY | Age: 67
End: 2017-06-15

## 2017-06-15 DIAGNOSIS — M19.079 ARTHRITIS, MIDFOOT: ICD-10-CM

## 2017-06-29 ENCOUNTER — OFFICE VISIT - HEALTHEAST (OUTPATIENT)
Dept: PODIATRY | Age: 67
End: 2017-06-29

## 2017-06-29 DIAGNOSIS — M79.671 FOOT PAIN, RIGHT: ICD-10-CM

## 2017-06-29 DIAGNOSIS — M19.079 ARTHRITIS, MIDFOOT: ICD-10-CM

## 2017-09-07 ENCOUNTER — OFFICE VISIT - HEALTHEAST (OUTPATIENT)
Dept: PODIATRY | Age: 67
End: 2017-09-07

## 2017-09-07 DIAGNOSIS — M19.079 ARTHRITIS, MIDFOOT: ICD-10-CM

## 2017-09-07 ASSESSMENT — MIFFLIN-ST. JEOR: SCORE: 1152.25

## 2017-09-10 ENCOUNTER — COMMUNICATION - HEALTHEAST (OUTPATIENT)
Dept: FAMILY MEDICINE | Facility: CLINIC | Age: 67
End: 2017-09-10

## 2017-09-10 DIAGNOSIS — K21.9 GASTROESOPHAGEAL REFLUX DISEASE WITHOUT ESOPHAGITIS: ICD-10-CM

## 2017-10-02 ENCOUNTER — COMMUNICATION - HEALTHEAST (OUTPATIENT)
Dept: TELEHEALTH | Facility: CLINIC | Age: 67
End: 2017-10-02

## 2017-10-02 ENCOUNTER — OFFICE VISIT - HEALTHEAST (OUTPATIENT)
Dept: FAMILY MEDICINE | Facility: CLINIC | Age: 67
End: 2017-10-02

## 2017-10-02 DIAGNOSIS — Z00.00 HEALTH CARE MAINTENANCE: ICD-10-CM

## 2017-10-02 DIAGNOSIS — Z12.31 ENCOUNTER FOR SCREENING MAMMOGRAM FOR BREAST CANCER: ICD-10-CM

## 2017-10-02 DIAGNOSIS — E78.00 HYPERCHOLESTEREMIA: ICD-10-CM

## 2017-10-02 DIAGNOSIS — E03.9 HYPOTHYROIDISM: ICD-10-CM

## 2017-10-02 DIAGNOSIS — Z12.11 SCREEN FOR COLON CANCER: ICD-10-CM

## 2017-10-02 LAB
CHOLEST SERPL-MCNC: 172 MG/DL
FASTING STATUS PATIENT QL REPORTED: NO
HDLC SERPL-MCNC: 80 MG/DL
LDLC SERPL CALC-MCNC: 77 MG/DL
TRIGL SERPL-MCNC: 74 MG/DL

## 2017-10-02 ASSESSMENT — MIFFLIN-ST. JEOR: SCORE: 1165.86

## 2017-10-04 ENCOUNTER — OFFICE VISIT (OUTPATIENT)
Dept: OPHTHALMOLOGY | Facility: CLINIC | Age: 67
End: 2017-10-04
Attending: OPHTHALMOLOGY
Payer: MEDICARE

## 2017-10-04 DIAGNOSIS — M35.00 SICCA SYNDROME (H): ICD-10-CM

## 2017-10-04 DIAGNOSIS — H11.823 CONJUNCTIVOCHALASIS, BILATERAL: ICD-10-CM

## 2017-10-04 DIAGNOSIS — H01.00A BLEPHARITIS OF UPPER AND LOWER EYELIDS OF BOTH EYES, UNSPECIFIED TYPE: Primary | ICD-10-CM

## 2017-10-04 DIAGNOSIS — H01.00B BLEPHARITIS OF UPPER AND LOWER EYELIDS OF BOTH EYES, UNSPECIFIED TYPE: Primary | ICD-10-CM

## 2017-10-04 PROCEDURE — 99212 OFFICE O/P EST SF 10 MIN: CPT | Mod: ZF

## 2017-10-04 RX ORDER — DOXYCYCLINE 50 MG/1
50 CAPSULE ORAL DAILY
Qty: 90 CAPSULE | Refills: 3 | Status: SHIPPED | OUTPATIENT
Start: 2017-10-04 | End: 2018-10-12

## 2017-10-04 ASSESSMENT — REFRACTION_WEARINGRX
OD_ADD: +2.50
OD_CYLINDER: +1.50
OD_HPRISM: 3 BI
OS_ADD: +2.50
OS_CYLINDER: +1.25
OS_AXIS: 168
OD_AXIS: 013
OD_SPHERE: -1.00
OS_SPHERE: -1.25
SPECS_TYPE: PAL
OS_HPRISM: 3 BI

## 2017-10-04 ASSESSMENT — CONF VISUAL FIELD
OS_NORMAL: 1
OD_NORMAL: 1
METHOD: COUNTING FINGERS

## 2017-10-04 ASSESSMENT — VISUAL ACUITY
METHOD: SNELLEN - LINEAR
OS_CC+: -1
CORRECTION_TYPE: GLASSES
OD_CC+: -1
OD_CC: 20/20
OS_CC: 20/20

## 2017-10-04 ASSESSMENT — TONOMETRY
OD_IOP_MMHG: 11
IOP_METHOD: ICARE
OS_IOP_MMHG: 11

## 2017-10-04 NOTE — MR AVS SNAPSHOT
After Visit Summary   10/4/2017    Lissa Lucero    MRN: [id number]           Patient Information     Date Of Birth          1950        Visit Information        Provider Department      10/4/2017 3:00 PM Maik Cuello MD [facility]        Today's Diagnoses     Blepharitis of upper and lower eyelids of both eyes, unspecified type - Both Eyes    -  1    Conjunctivochalasis, bilateral - Both Eyes        Sicca syndrome (H)           Follow-ups after your visit        Follow-up notes from your care team     Return in about 3 months (around 2018) for Follow Up.      Who to contact     Please call your clinic at [phone] to:    Ask questions about your health    Make or cancel appointments    Discuss your medicines    Learn about your test results    Speak to your doctor   If you have compliments or concerns about an experience at your clinic, or if you wish to file a complaint, please contact [facility] Patient Relations at [phone] or email us at [email or web address]         Additional Information About Your Visit        MyChart Information     [employer] is an electronic gateway that provides easy, online access to your medical records. With [employer], you can request a clinic appointment, read your test results, renew a prescription or communicate with your care team.     To sign up for [employer] visit the website at [email or web address]   You will be asked to enter the access code listed below, as well as some personal information. Please follow the directions to create your username and password.     Your access code is: [email or web address]  Expires: 10/31/2017  6:31 AM     Your access code will  in 90 days. If you need help or a new code, please contact your [facility] or call [phone] for assistance.        Care EveryWhere ID     This is your Care EveryWhere ID. This could be used by other  organizations to access your [place] medical records  [id number]         Blood Pressure from Last 3 Encounters:   01/07/16 118/58   09/14/15 132/66    Weight from Last 3 Encounters:   No data found for Wt              Today, you had the following     No orders found for display         Today's Medication Changes          These changes are accurate as of: 10/4/17  4:21 PM.  If you have any questions, ask your nurse or doctor.               These medicines have changed or have updated prescriptions.        Dose/Directions    * doxycycline Monohydrate 100 MG Caps   This may have changed:  Another medication with the same name was added. Make sure you understand how and when to take each.   Used for:  Conjunctivochalasis, bilateral, Blepharitis of upper and lower eyelids of both eyes, unspecified type   Changed by:  Maik Cuello MD        Dose:  1 capsule   Take 1 capsule (100 mg) by mouth daily   Quantity:  30 capsule   Refills:  3       * doxycycline Monohydrate 50 MG Caps capsule   This may have changed:  You were already taking a medication with the same name, and this prescription was added. Make sure you understand how and when to take each.   Used for:  Blepharitis of upper and lower eyelids of both eyes, unspecified type, Conjunctivochalasis, bilateral, Sicca syndrome (H)   Changed by:  Maik Cuello MD        Dose:  50 mg   Take 1 capsule (50 mg) by mouth daily   Quantity:  90 capsule   Refills:  3       * Notice:  This list has 2 medication(s) that are the same as other medications prescribed for you. Read the directions carefully, and ask your doctor or other care provider to review them with you.         Where to get your medicines      [facility] [place], [place] - [address] AT [facility] [address], [facility] [address]    Hours:  called pharm.  they do accept faxes Phone:   [phone]     doxycycline Monohydrate 50 MG Caps capsule                Primary Care Provider Office Phone # Fax #    Linsey Gutiérrez [phone]       [facility] [place] [address] [place] [address]        Equal Access to Services     SUZIE VITALE : Hadii aad ku hadpedroo Soomaali, waaxda luqadaha, qaybta kaalmada adeegyada, waxay cierain destineyn adetimothy garcia alessandro norris. So [facility] [phone].    ATENCIÓN: Si habla español, tiene a christianson disposición servicios gratuitos de asistencia lingüística. Llame al [phone].    We comply with applicable federal civil rights laws and [place] laws. We do not discriminate on the basis of race, color, national origin, age, disability, sex, sexual orientation, or gender identity.            Thank you!     Thank you for choosing EYE CLINIC  for your care. Our goal is always to provide you with excellent care. Hearing back from our patients is one way we can continue to improve our services. Please take a few minutes to complete the written survey that you may receive in the mail after your visit with us. Thank you!             Your Updated Medication List - Protect others around you: Learn how to safely use, store and throw away your medicines at www.disposemymeds.org.          This list is accurate as of: 10/4/17  4:21 PM.  Always use your most recent med list.                   Brand Name Dispense Instructions for use Diagnosis    ACETAMINOPHEN PO      Take 325 mg by mouth every 4 hours as needed for pain        ASPIRIN PO      Take 81 mg by mouth daily        cycloSPORINE 0.05 % ophthalmic emulsion    RESTASIS     Place 1 drop into both eyes 2 times daily        * doxycycline Monohydrate 100 MG Caps     30 capsule    Take 1 capsule (100 mg) by mouth daily    Conjunctivochalasis, bilateral, Blepharitis of upper and lower eyelids of both eyes, unspecified type       * doxycycline Monohydrate 50 MG Caps capsule     90 capsule    Take 1 capsule (50  mg) by mouth daily    Blepharitis of upper and lower eyelids of both eyes, unspecified type, Conjunctivochalasis, bilateral, Sicca syndrome (H)       ESTROPIPATE PO      Take 0.75 mg by mouth daily        FLEXERIL PO      Take 10 mg by mouth as needed for muscle spasms        gabapentin 600 MG tablet    NEURONTIN     Take 1,200 mg by mouth 3 times daily        levothyroxine 112 MCG tablet    SYNTHROID/LEVOTHROID     Take 112 mcg by mouth daily        LUTEIN 20 PO      Take by mouth daily        METOPROLOL TARTRATE PO      Take 25 mg by mouth        MULTIVITAMIN PO      Take by mouth daily        NIFEDICAL XL 30 MG 24 hr tablet   Generic drug:  NIFEdipine ER osmotic      Take 30 mg by mouth daily        pantoprazole 40 MG EC tablet    PROTONIX     Take by mouth daily        SIMVASTATIN PO      Take 20 mg by mouth At Bedtime        ZANTAC PO      Take 150 mg by mouth daily        * Notice:  This list has 2 medication(s) that are the same as other medications prescribed for you. Read the directions carefully, and ask your doctor or other care provider to review them with you.

## 2017-10-04 NOTE — NURSING NOTE
Chief Complaints and History of Present Illnesses   Patient presents with     Follow Up For     5 month follow up  s/p Conjunctivochalasis excision OU     HPI    Affected eye(s):  Both   Symptoms:     No floaters   No flashes   No redness         Do you have eye pain now?:  No      Comments:  Pt states vision fluctuates daily with dryness. Pt notes that she has days with excessive tearing. Pt uses AT's prn.    Sebastián CLINE October 4, 2017 2:45 PM

## 2017-10-04 NOTE — PROGRESS NOTES
CC: S/p conjunctivochalasis excision OU    HPI: 66yo F referred by Dr. Josey Chau evaluated for dryness and conjunctivochalasis.    Now POM#6 s/p Conjunctivochalasis excision OU    Doing really well per patient. Vision great. Reports tearing when in wind. Eyes are sometimes irrtated.  History of improved symptoms on doxycycline 100 mg daily    POHx:  S/p conjunctivochalasis excision OU 4/11/17    Gtts:   Restasis BID OU   AT PRN (2-3x/day)    A/P:    1. Moderate conjunctivochalasis both eyes s/p excision OU 4/11/17  - doing well  - conj well healed  - dry eyes OU  - encouraged using PFAT Q2-4 hrs, start refresh PM ointment QHS  - continue flax seed oil  - reports dry mouth in addition to dry eyes: consider work up with Sjogrens  - consider adding doxycycline 100 mg daily    2. Posterior blepharitis both eyes    3. Pseudophakia both eyes    RTC 3 months.     ANALISA Ghosh  Cornea fellow    ~~~~~~~~~~~~~~~~~~~~~~~~~~~~~~~~~~~~~~~~~~~~~~~~~~~~~~~~~~~~~~~~    Complete documentation of historical and exam elements from today's encounter can be found in the full encounter summary report (not reduplicated in this progress note). I personally obtained the chief complaint(s) and history of present illness.  I confirmed and edited as necessary the review of systems, past medical/surgical history, family history, social history, and examination findings as documented by others; and I examined the patient myself. I personally reviewed the relevant tests, images, and reports as documented above. I formulated and edited as necessary the assessment and plan and discussed the findings and management plan with the patient and family.    Maik Cuello MD

## 2017-10-06 ENCOUNTER — AMBULATORY - HEALTHEAST (OUTPATIENT)
Dept: FAMILY MEDICINE | Facility: CLINIC | Age: 67
End: 2017-10-06

## 2017-10-06 ENCOUNTER — COMMUNICATION - HEALTHEAST (OUTPATIENT)
Dept: FAMILY MEDICINE | Facility: CLINIC | Age: 67
End: 2017-10-06

## 2017-10-09 ENCOUNTER — COMMUNICATION - HEALTHEAST (OUTPATIENT)
Dept: FAMILY MEDICINE | Facility: CLINIC | Age: 67
End: 2017-10-09

## 2017-10-10 ENCOUNTER — RECORDS - HEALTHEAST (OUTPATIENT)
Dept: ADMINISTRATIVE | Facility: OTHER | Age: 67
End: 2017-10-10

## 2017-10-10 ENCOUNTER — COMMUNICATION - HEALTHEAST (OUTPATIENT)
Dept: FAMILY MEDICINE | Facility: CLINIC | Age: 67
End: 2017-10-10

## 2017-10-10 ENCOUNTER — AMBULATORY - HEALTHEAST (OUTPATIENT)
Dept: FAMILY MEDICINE | Facility: CLINIC | Age: 67
End: 2017-10-10

## 2017-10-11 ENCOUNTER — COMMUNICATION - HEALTHEAST (OUTPATIENT)
Dept: FAMILY MEDICINE | Facility: CLINIC | Age: 67
End: 2017-10-11

## 2017-10-12 ENCOUNTER — HOSPITAL ENCOUNTER (OUTPATIENT)
Dept: MAMMOGRAPHY | Facility: CLINIC | Age: 67
Discharge: HOME | End: 2017-10-12
Attending: FAMILY MEDICINE

## 2017-10-12 DIAGNOSIS — Z12.31 ENCOUNTER FOR SCREENING MAMMOGRAM FOR BREAST CANCER: ICD-10-CM

## 2017-11-08 ENCOUNTER — COMMUNICATION - HEALTHEAST (OUTPATIENT)
Dept: FAMILY MEDICINE | Facility: CLINIC | Age: 67
End: 2017-11-08

## 2017-11-20 ENCOUNTER — COMMUNICATION - HEALTHEAST (OUTPATIENT)
Dept: FAMILY MEDICINE | Facility: CLINIC | Age: 67
End: 2017-11-20

## 2017-11-22 ENCOUNTER — OFFICE VISIT - HEALTHEAST (OUTPATIENT)
Dept: FAMILY MEDICINE | Facility: CLINIC | Age: 67
End: 2017-11-22

## 2017-11-22 DIAGNOSIS — G60.9 HEREDITARY AND IDIOPATHIC PERIPHERAL NEUROPATHY: ICD-10-CM

## 2017-11-22 DIAGNOSIS — E78.00 PURE HYPERCHOLESTEROLEMIA: ICD-10-CM

## 2017-11-22 DIAGNOSIS — E03.9 HYPOTHYROID: ICD-10-CM

## 2017-11-22 DIAGNOSIS — I10 ESSENTIAL HYPERTENSION, BENIGN: ICD-10-CM

## 2017-11-22 DIAGNOSIS — M79.671 FOOT PAIN, RIGHT: ICD-10-CM

## 2017-11-22 ASSESSMENT — MIFFLIN-ST. JEOR: SCORE: 1161.32

## 2017-12-11 ENCOUNTER — COMMUNICATION - HEALTHEAST (OUTPATIENT)
Dept: FAMILY MEDICINE | Facility: CLINIC | Age: 67
End: 2017-12-11

## 2017-12-11 DIAGNOSIS — G52.1 GLOSSOPHARYNGEAL NEURALGIA: ICD-10-CM

## 2017-12-18 ENCOUNTER — COMMUNICATION - HEALTHEAST (OUTPATIENT)
Dept: FAMILY MEDICINE | Facility: CLINIC | Age: 67
End: 2017-12-18

## 2018-01-03 ENCOUNTER — OFFICE VISIT - HEALTHEAST (OUTPATIENT)
Dept: FAMILY MEDICINE | Facility: CLINIC | Age: 68
End: 2018-01-03

## 2018-01-03 DIAGNOSIS — S76.012A STRAIN OF HIP FLEXOR, LEFT, INITIAL ENCOUNTER: ICD-10-CM

## 2018-01-03 DIAGNOSIS — R26.89 BALANCE DISORDER: ICD-10-CM

## 2018-01-03 DIAGNOSIS — R26.9 GAIT ABNORMALITY: ICD-10-CM

## 2018-01-03 ASSESSMENT — MIFFLIN-ST. JEOR: SCORE: 1156.79

## 2018-01-05 ENCOUNTER — MEDICAL CORRESPONDENCE (OUTPATIENT)
Dept: HEALTH INFORMATION MANAGEMENT | Facility: CLINIC | Age: 68
End: 2018-01-05

## 2018-01-23 ENCOUNTER — OFFICE VISIT (OUTPATIENT)
Dept: PHYSICAL MEDICINE AND REHAB | Facility: CLINIC | Age: 68
End: 2018-01-23
Payer: MEDICARE

## 2018-01-23 ENCOUNTER — RECORDS - HEALTHEAST (OUTPATIENT)
Dept: ADMINISTRATIVE | Facility: OTHER | Age: 68
End: 2018-01-23

## 2018-01-23 ENCOUNTER — CARE COORDINATION (OUTPATIENT)
Dept: NEUROLOGY | Facility: CLINIC | Age: 68
End: 2018-01-23

## 2018-01-23 VITALS
HEART RATE: 78 BPM | HEIGHT: 64 IN | WEIGHT: 151.3 LBS | OXYGEN SATURATION: 97 % | SYSTOLIC BLOOD PRESSURE: 146 MMHG | BODY MASS INDEX: 25.83 KG/M2 | DIASTOLIC BLOOD PRESSURE: 57 MMHG

## 2018-01-23 DIAGNOSIS — Z98.890 STATUS POST RIGHT FOOT SURGERY: ICD-10-CM

## 2018-01-23 ASSESSMENT — PAIN SCALES - GENERAL: PAINLEVEL: NO PAIN (0)

## 2018-01-23 NOTE — MR AVS SNAPSHOT
After Visit Summary   1/23/2018    Lissa Lucero    MRN: [id number]           Patient Information     Date Of Birth          1950        Visit Information        Provider Department      1/23/2018 10:00 AM Lux Noyola DO [facility] Physical Medicine and Rehabilitation        Today's Diagnoses     Disorder of toe of right foot    -  1    Status post right foot surgery          Care Instructions    We addressed the following today:    1. Right great toe disorder  2. History of right foot surgery    Activity modification as discussed  Physical therapy:   Follow up in 6 weeks if no improvement of symptoms for further evaluation/medical care (sooner if needed; call direct clinic number [[phone]] at any time with questions/concerns)            Follow-ups after your visit        Additional Services     PHYSICAL THERAPY REFERRAL       Diagnosis: Right great toe disorder; history of right foot surgery    Treatment: Formal physical therapy - exercises to include joint mobilizations including pain-free range of motion and flexibility/balance training with foot intrinsic strengthening including use of modalities (ultrasound, contrast/whirlpool baths, and manual therapy) as needed with home exercise prescription.                  Who to contact     Please call your clinic at [phone] to:    Ask questions about your health    Make or cancel appointments    Discuss your medicines    Learn about your test results    Speak to your doctor   If you have compliments or concerns about an experience at your clinic, or if you wish to file a complaint, please contact [facility] Patient Relations at [phone] or email us at [email or web address]         Additional Information About Your Visit        Vnomicshart Information     mAPPn is an electronic gateway that provides easy, online access to your medical records. With mAPPn, you can request a clinic appointment,  "read your test results, renew a prescription or communicate with your care team.     To sign up for GeoLearninghart visit the website at [email or web address]   You will be asked to enter the access code listed below, as well as some personal information. Please follow the directions to create your username and password.     Your access code is: [email or web address]  Expires: 2018  6:30 AM     Your access code will  in 90 days. If you need help or a new code, please contact your [facility] or call [phone] for assistance.        Care EveryWhere ID     This is your Care EveryWhere ID. This could be used by other organizations to access your [place] medical records  [id number]        Your Vitals Were     Pulse Height Pulse Oximetry BMI (Body Mass Index)          78 1.626 m (5' 4\") 97% 25.97 kg/m2         Blood Pressure from Last 3 Encounters:   18 146/57   16 118/58   09/14/15 132/66    Weight from Last 3 Encounters:   18 68.6 kg (151 lb 4.8 oz)              We Performed the Following     PHYSICAL THERAPY REFERRAL        Primary Care Provider Office Phone # Fax #    Linsey TAVERAS Brock [phone]       [facility] [address] [place] [facility] [address]        Equal Access to Services     SUZIE VITALE AH: Hadii sagrario ku hadasho Soomaali, waaxda luqadaha, qaybta kaalmada adeegyada, ching norris. So [facility] [phone].    ATENCIÓN: Si habla español, tiene a christianson disposición servicios gratuitos de asistencia lingüística. Alan al [phone].    We comply with applicable federal civil rights laws and [place] laws. We do not discriminate on the basis of race, color, national origin, age, disability, sex, sexual orientation, or gender identity.            Thank you!     Thank you for choosing [facility] PHYSICAL MEDICINE AND REHABILITATION  for your care. Our goal is always to provide you with excellent care. Hearing " back from our patients is one way we can continue to improve our services. Please take a few minutes to complete the written survey that you may receive in the mail after your visit with us. Thank you!             Your Updated Medication List - Protect others around you: Learn how to safely use, store and throw away your medicines at www.disposemymeds.org.          This list is accurate as of: 1/23/18 10:44 AM.  Always use your most recent med list.                   Brand Name Dispense Instructions for use Diagnosis    ACETAMINOPHEN PO      Take 325 mg by mouth every 4 hours as needed for pain        ASPIRIN PO      Take 81 mg by mouth daily        cycloSPORINE 0.05 % ophthalmic emulsion    RESTASIS     Place 1 drop into both eyes 2 times daily        doxycycline monohydrate 50 MG capsule     90 capsule    Take 1 capsule (50 mg) by mouth daily    Blepharitis of upper and lower eyelids of both eyes, unspecified type, Conjunctivochalasis, bilateral, Sicca syndrome (H)       FLEXERIL PO      Take 10 mg by mouth as needed for muscle spasms        gabapentin 600 MG tablet    NEURONTIN     Take 1,200 mg by mouth 3 times daily        levothyroxine 112 MCG tablet    SYNTHROID/LEVOTHROID     Take 112 mcg by mouth daily        METOPROLOL TARTRATE PO      Take 25 mg by mouth        MULTIVITAMIN PO      Take by mouth daily        NIFEDICAL XL 30 MG 24 hr tablet   Generic drug:  NIFEdipine ER osmotic      Take 30 mg by mouth daily        SIMVASTATIN PO      Take 20 mg by mouth At Bedtime        ZANTAC PO      Take 150 mg by mouth daily

## 2018-01-23 NOTE — PROGRESS NOTES
"[facility] Physical Medicine & Rehabilitation Clinic Note  Clinic Visit s Jan 23, 2018    Subjective:  Lissa Lucero is a 67 year old female who is seen in consultation at the request of Dr. Linsey Gutiérrez for evaluation of right foot abnormality/disorder.    Symptoms began in August 2017.  Reports insidious onset without acute precipitating event.  Denies any right foot pain presently with extension contracture of the right great toe.  Symptoms are generally worse with tight shoes.  Other treatment has consisted of acupuncture, orthotics, previous corticosteroid injections, and different shoewear.  Notes long standing numbness/tingling of the right foot (plantar portion) related to underlying peripheral neuropathy.  Notes weakness of the right lower extremity.  Notes history of a right foot Lapidus bunionectomy in 5/2017 at [facility].    Patient's past medical, surgical, social, and family histories are reviewed today.  Significant medical history as noted above  History reviewed. No pertinent past medical history.    Review of Systems:  Constitutional: NEGATIVE for fever, chills, change in weight  Skin: NEGATIVE for worrisome rashes, moles or lesions  Neuro: POSITIVE for weakness of the right lower extremity  MSK: see HPI    Objective:  Ht 1.626 m (5' 4\")  Wt 68.6 kg (151 lb 4.8 oz)  BMI 25.97 kg/m2  General: healthy, alert, and in no distress  Skin: no suspicious lesions or rashes  Psych: mentation appears normal and affect normal/bright  HEENT: no scleral icterus  CV: no pedal edema  Resp: normal respiratory effort without conversational dyspnea   Neuro: motor strength as noted below  Lymph: no palpable lymphadenopathy    MSK:    RIGHT FOOT  Inspection:    Oblique surgical scar noted of the medial foot region  Palpation:    Tenderness about the first MTP joint     No tenderness about the  medial malleolus, lateral malleolus, Achilles tendon, ATFL, CFL, base of the fifth metatarsal, first " through fifth metatarsals, and second through fifth MTP joints  Range of Motion:     Active great toe flexion limited by tightness    Active great toe extension within normal limits    Active dorsiflexion/plantarflexion/inversion/eversion within normal limits   Strength:    Great toe flexion - 1/5    Great toe extension - 5/5    Dorsiflexion/plantarflexion/inversion/eversion - 5/5  Special Tests:    Positive: Unable to complete single leg calf raises    Negative: Chua's testing and squeeze testing    Imaging:  Outside reports from [facility] were reviewed today and results were discussed with the patient  Right foot x-rays - 6/29/2017  Impression:  1. Interval osteotomy and arthrodesis right first TMT joint with medial plate and screw.   2. Some lucency visible along the osteotomy line at arthrodesis site observed on the lateral radiograph with otherwise good bony bridging observed on the AP and lateral.   3. Soft tissue swelling medial aspect right midfoot.   4. Right foot otherwise negative.    ASSESSMENT:  1. Extensor contracture of the right great toe  2. Right great toe disorder/abnormality   3. History of right foot surgery    PLAN:  1. Formal physical therapy - exercises to include joint mobilizations including pain-free range of motion and flexibility/balance training with foot intrinsic strengthening including use of modalities (ultrasound, contrast/whirlpool baths, and manual therapy) as needed with home exercise prescription.  2. Activity modification as discussed, including limitation of activities that cause pain/discomfort.  3. Plain radiographs of the right foot CD requested from [facility] for review of x-rays from 6/2017.  4. Follow-up in 6 weeks for further evaluation/medical care.  If asymptomatic, can follow-up as needed.  Consider additional formal physical therapy, Botulinum toxin injection referral, referral back/second surgical opinion for consideration of extensor hallucis longus tendon  lengthening, etc as deemed appropriate moving forward.    Patient's conditions were thoroughly discussed during today's visit with greater than 50% of the visit spent counseling the patient with total time spent face-to-face with the patient being 30 minutes.    Lux Noyola DO, CAROLINA    Department of Rehabilitation Medicine

## 2018-01-23 NOTE — LETTER
"1/23/2018       RE: Lissa Lucero  [address]  [place] [facility] [place] [address]     Dear Colleague,    Thank you for referring your patient, Lissa Lucero, to the [facility] PHYSICAL MEDICINE AND REHABILITATION at [facility]. Please see a copy of my visit note below.    [facility] Physical Medicine & Rehabilitation Clinic Note  Clinic Visit s Jan 23, 2018    Subjective:  Lissa Lucero is a 67 year old female who is seen in consultation at the request of Dr. Linsey Gutiérrez for evaluation of right foot abnormality/disorder.    Symptoms began in August 2017.  Reports insidious onset without acute precipitating event.  Denies any right foot pain presently with extension contracture of the right great toe.  Symptoms are generally worse with tight shoes.  Other treatment has consisted of acupuncture, orthotics, previous corticosteroid injections, and different shoewear.  Notes long standing numbness/tingling of the right foot (plantar portion) related to underlying peripheral neuropathy.  Notes weakness of the right lower extremity.  Notes history of a right foot Lapidus bunionectomy in 5/2017 at [facility].    Patient's past medical, surgical, social, and family histories are reviewed today.  Significant medical history as noted above  History reviewed. No pertinent past medical history.    Review of Systems:  Constitutional: NEGATIVE for fever, chills, change in weight  Skin: NEGATIVE for worrisome rashes, moles or lesions  Neuro: POSITIVE for weakness of the right lower extremity  MSK: see HPI    Objective:  Ht 1.626 m (5' 4\")  Wt 68.6 kg (151 lb 4.8 oz)  BMI 25.97 kg/m2  General: healthy, alert, and in no distress  Skin: no suspicious lesions or rashes  Psych: mentation appears normal and affect normal/bright  HEENT: no scleral icterus  CV: no pedal edema  Resp: normal respiratory effort without conversational dyspnea   Neuro: motor strength as noted " below  Lymph: no palpable lymphadenopathy    MSK:    RIGHT FOOT  Inspection:    Oblique surgical scar noted of the medial foot region  Palpation:    Tenderness about the first MTP joint     No tenderness about the  medial malleolus, lateral malleolus, Achilles tendon, ATFL, CFL, base of the fifth metatarsal, first through fifth metatarsals, and second through fifth MTP joints  Range of Motion:     Active great toe flexion limited by tightness    Active great toe extension within normal limits    Active dorsiflexion/plantarflexion/inversion/eversion within normal limits   Strength:    Great toe flexion - 1/5    Great toe extension - 5/5    Dorsiflexion/plantarflexion/inversion/eversion - 5/5  Special Tests:    Positive: Unable to complete single leg calf raises    Negative: Chua's testing and squeeze testing    Imaging:  Outside reports [facility] were reviewed today and results were discussed with the patient  Right foot x-rays - 6/29/2017  Impression:  1. Interval osteotomy and arthrodesis right first TMT joint with medial plate and screw.   2. Some lucency visible along the osteotomy line at arthrodesis site observed on the lateral radiograph with otherwise good bony bridging observed on the AP and lateral.   3. Soft tissue swelling medial aspect right midfoot.   4. Right foot otherwise negative.    ASSESSMENT:  1. Extensor contracture of the right great toe  2. Right great toe disorder/abnormality   3. History of right foot surgery    PLAN:  1. Formal physical therapy - exercises to include joint mobilizations including pain-free range of motion and flexibility/balance training with foot intrinsic strengthening including use of modalities (ultrasound, contrast/whirlpool baths, and manual therapy) as needed with home exercise prescription.  2. Activity modification as discussed, including limitation of activities that cause pain/discomfort.  3. Plain radiographs of the right foot CD requested from  [facility] for review of x-rays from 6/2017.  4. Follow-up in 6 weeks for further evaluation/medical care.  If asymptomatic, can follow-up as needed.  Consider additional formal physical therapy, Botulinum toxin injection referral, referral back/second surgical opinion for consideration of extensor hallucis longus tendon lengthening, etc as deemed appropriate moving forward.    Patient's conditions were thoroughly discussed during today's visit with greater than 50% of the visit spent counseling the patient with total time spent face-to-face with the patient being 30 minutes.      Again, thank you for allowing me to participate in the care of your patient.      Sincerely,    Lux Noyola, DO

## 2018-01-23 NOTE — PATIENT INSTRUCTIONS
We addressed the following today:    1. Right great toe disorder  2. History of right foot surgery    Activity modification as discussed  Physical therapy  Follow up in 6 weeks if no improvement of symptoms for further evaluation/medical care (sooner if needed; call direct clinic number [[phone]] at any time with questions/concerns)

## 2018-02-19 ENCOUNTER — COMMUNICATION - HEALTHEAST (OUTPATIENT)
Dept: FAMILY MEDICINE | Facility: CLINIC | Age: 68
End: 2018-02-19

## 2018-02-19 DIAGNOSIS — I25.119 CORONARY ARTERY DISEASE INVOLVING NATIVE CORONARY ARTERY OF NATIVE HEART WITH ANGINA PECTORIS (H): ICD-10-CM

## 2018-02-20 ENCOUNTER — COMMUNICATION - HEALTHEAST (OUTPATIENT)
Dept: FAMILY MEDICINE | Facility: CLINIC | Age: 68
End: 2018-02-20

## 2018-02-20 DIAGNOSIS — I10 HYPERTENSION: ICD-10-CM

## 2018-03-28 ENCOUNTER — COMMUNICATION - HEALTHEAST (OUTPATIENT)
Dept: FAMILY MEDICINE | Facility: CLINIC | Age: 68
End: 2018-03-28

## 2018-04-16 ENCOUNTER — OFFICE VISIT - HEALTHEAST (OUTPATIENT)
Dept: FAMILY MEDICINE | Facility: CLINIC | Age: 68
End: 2018-04-16

## 2018-04-16 DIAGNOSIS — G60.9 HEREDITARY AND IDIOPATHIC PERIPHERAL NEUROPATHY: ICD-10-CM

## 2018-04-16 ASSESSMENT — MIFFLIN-ST. JEOR: SCORE: 1147.72

## 2018-04-23 ENCOUNTER — AMBULATORY - HEALTHEAST (OUTPATIENT)
Dept: FAMILY MEDICINE | Facility: CLINIC | Age: 68
End: 2018-04-23

## 2018-04-26 ENCOUNTER — TRANSFERRED RECORDS (OUTPATIENT)
Dept: HEALTH INFORMATION MANAGEMENT | Facility: CLINIC | Age: 68
End: 2018-04-26

## 2018-05-03 ENCOUNTER — SURGERY - HEALTHEAST (OUTPATIENT)
Dept: SURGERY | Facility: HOSPITAL | Age: 68
End: 2018-05-03

## 2018-05-03 ENCOUNTER — ANESTHESIA - HEALTHEAST (OUTPATIENT)
Dept: SURGERY | Facility: HOSPITAL | Age: 68
End: 2018-05-03

## 2018-05-03 ASSESSMENT — MIFFLIN-ST. JEOR: SCORE: 1148.62

## 2018-05-09 ENCOUNTER — COMMUNICATION - HEALTHEAST (OUTPATIENT)
Dept: FAMILY MEDICINE | Facility: CLINIC | Age: 68
End: 2018-05-09

## 2018-05-09 ENCOUNTER — OFFICE VISIT - HEALTHEAST (OUTPATIENT)
Dept: FAMILY MEDICINE | Facility: CLINIC | Age: 68
End: 2018-05-09

## 2018-05-09 DIAGNOSIS — Z09 STATUS POST APPENDECTOMY, FOLLOW-UP EXAM: ICD-10-CM

## 2018-05-09 DIAGNOSIS — E78.00 PURE HYPERCHOLESTEROLEMIA: ICD-10-CM

## 2018-05-09 ASSESSMENT — MIFFLIN-ST. JEOR: SCORE: 1147.72

## 2018-05-22 ENCOUNTER — COMMUNICATION - HEALTHEAST (OUTPATIENT)
Dept: FAMILY MEDICINE | Facility: CLINIC | Age: 68
End: 2018-05-22

## 2018-05-22 DIAGNOSIS — I25.119 CORONARY ARTERY DISEASE INVOLVING NATIVE CORONARY ARTERY OF NATIVE HEART WITH ANGINA PECTORIS (H): ICD-10-CM

## 2018-05-25 ENCOUNTER — COMMUNICATION - HEALTHEAST (OUTPATIENT)
Dept: FAMILY MEDICINE | Facility: CLINIC | Age: 68
End: 2018-05-25

## 2018-05-31 ENCOUNTER — OFFICE VISIT - HEALTHEAST (OUTPATIENT)
Dept: FAMILY MEDICINE | Facility: CLINIC | Age: 68
End: 2018-05-31

## 2018-05-31 DIAGNOSIS — F32.A DEPRESSION: ICD-10-CM

## 2018-05-31 LAB
ERYTHROCYTE [DISTWIDTH] IN BLOOD BY AUTOMATED COUNT: 11.1 % (ref 11–14.5)
HCT VFR BLD AUTO: 40.8 % (ref 35–47)
HGB BLD-MCNC: 13.8 G/DL (ref 12–16)
MCH RBC QN AUTO: 32.9 PG (ref 27–34)
MCHC RBC AUTO-ENTMCNC: 33.9 G/DL (ref 32–36)
MCV RBC AUTO: 97 FL (ref 80–100)
PLATELET # BLD AUTO: 308 THOU/UL (ref 140–440)
PMV BLD AUTO: 8.5 FL (ref 7–10)
RBC # BLD AUTO: 4.21 MILL/UL (ref 3.8–5.4)
WBC: 4.6 THOU/UL (ref 4–11)

## 2018-05-31 ASSESSMENT — MIFFLIN-ST. JEOR: SCORE: 1147.72

## 2018-06-01 ENCOUNTER — AMBULATORY - HEALTHEAST (OUTPATIENT)
Dept: FAMILY MEDICINE | Facility: CLINIC | Age: 68
End: 2018-06-01

## 2018-06-01 ENCOUNTER — COMMUNICATION - HEALTHEAST (OUTPATIENT)
Dept: ADMINISTRATIVE | Facility: CLINIC | Age: 68
End: 2018-06-01

## 2018-06-01 DIAGNOSIS — F43.10 PTSD (POST-TRAUMATIC STRESS DISORDER): ICD-10-CM

## 2018-06-01 LAB — TSH SERPL DL<=0.005 MIU/L-ACNC: 0.03 UIU/ML (ref 0.3–5)

## 2018-06-05 ENCOUNTER — AMBULATORY - HEALTHEAST (OUTPATIENT)
Dept: FAMILY MEDICINE | Facility: CLINIC | Age: 68
End: 2018-06-05

## 2018-07-25 ENCOUNTER — COMMUNICATION - HEALTHEAST (OUTPATIENT)
Dept: FAMILY MEDICINE | Facility: CLINIC | Age: 68
End: 2018-07-25

## 2018-08-27 ENCOUNTER — RECORDS - HEALTHEAST (OUTPATIENT)
Dept: GENERAL RADIOLOGY | Facility: CLINIC | Age: 68
End: 2018-08-27

## 2018-08-27 ENCOUNTER — OFFICE VISIT - HEALTHEAST (OUTPATIENT)
Dept: FAMILY MEDICINE | Facility: CLINIC | Age: 68
End: 2018-08-27

## 2018-08-27 DIAGNOSIS — M25.552 PAIN IN LEFT HIP: ICD-10-CM

## 2018-08-27 DIAGNOSIS — M25.552 HIP PAIN, LEFT: ICD-10-CM

## 2018-08-27 DIAGNOSIS — M79.672 LEFT FOOT PAIN: ICD-10-CM

## 2018-08-27 ASSESSMENT — MIFFLIN-ST. JEOR: SCORE: 1165.86

## 2018-08-28 ENCOUNTER — MEDICAL CORRESPONDENCE (OUTPATIENT)
Dept: HEALTH INFORMATION MANAGEMENT | Facility: CLINIC | Age: 68
End: 2018-08-28

## 2018-08-28 ENCOUNTER — AMBULATORY - HEALTHEAST (OUTPATIENT)
Dept: FAMILY MEDICINE | Facility: CLINIC | Age: 68
End: 2018-08-28

## 2018-08-28 DIAGNOSIS — S76.211A GROIN STRAIN, RIGHT, INITIAL ENCOUNTER: ICD-10-CM

## 2018-09-06 ENCOUNTER — PATIENT OUTREACH (OUTPATIENT)
Dept: CARE COORDINATION | Facility: CLINIC | Age: 68
End: 2018-09-06

## 2018-09-07 ENCOUNTER — RECORDS - HEALTHEAST (OUTPATIENT)
Dept: ADMINISTRATIVE | Facility: OTHER | Age: 68
End: 2018-09-07

## 2018-09-07 ENCOUNTER — OFFICE VISIT (OUTPATIENT)
Dept: PHYSICAL MEDICINE AND REHAB | Facility: CLINIC | Age: 68
End: 2018-09-07
Payer: MEDICARE

## 2018-09-07 ENCOUNTER — COMMUNICATION - HEALTHEAST (OUTPATIENT)
Dept: FAMILY MEDICINE | Facility: CLINIC | Age: 68
End: 2018-09-07

## 2018-09-07 VITALS
HEART RATE: 68 BPM | HEIGHT: 64 IN | TEMPERATURE: 98.4 F | BODY MASS INDEX: 25.7 KG/M2 | OXYGEN SATURATION: 97 % | DIASTOLIC BLOOD PRESSURE: 77 MMHG | WEIGHT: 150.5 LBS | SYSTOLIC BLOOD PRESSURE: 124 MMHG | RESPIRATION RATE: 17 BRPM

## 2018-09-07 DIAGNOSIS — M25.552 CHRONIC LEFT HIP PAIN: Primary | ICD-10-CM

## 2018-09-07 DIAGNOSIS — G89.29 CHRONIC LEFT HIP PAIN: Primary | ICD-10-CM

## 2018-09-07 DIAGNOSIS — E03.9 HYPOTHYROIDISM: ICD-10-CM

## 2018-09-07 PROBLEM — E78.5 HYPERLIPIDEMIA: Status: ACTIVE | Noted: 2018-09-07

## 2018-09-07 PROBLEM — K58.9 IBS (IRRITABLE BOWEL SYNDROME): Status: ACTIVE | Noted: 2018-09-07

## 2018-09-07 PROBLEM — I10 HYPERTENSION: Status: ACTIVE | Noted: 2018-09-07

## 2018-09-07 PROBLEM — R94.39 ABNORMAL STRESS TEST: Status: ACTIVE | Noted: 2017-01-05

## 2018-09-07 PROBLEM — M54.9 CHRONIC BACK PAIN: Status: ACTIVE | Noted: 2018-09-07

## 2018-09-07 PROBLEM — I10 ESSENTIAL HYPERTENSION, BENIGN: Status: ACTIVE | Noted: 2018-09-07

## 2018-09-07 PROBLEM — I25.10 CORONARY ATHEROSCLEROSIS: Status: ACTIVE | Noted: 2018-09-07

## 2018-09-07 PROBLEM — M19.079 ARTHRITIS OF MIDFOOT: Status: ACTIVE | Noted: 2017-05-17

## 2018-09-07 ASSESSMENT — PAIN SCALES - GENERAL: PAINLEVEL: MILD PAIN (2)

## 2018-09-07 NOTE — PATIENT INSTRUCTIONS
We addressed the following today:    1. Left hip pain    Activity modification as discussed  Home exercise program as instructed  Physical therapy: [facility]  Topical Treatments: Ice or Heat  Over the counter medication: Acetaminophen (Tylenol) 1000mg every 6 hours with food (Maximum of 3000mg/day)  Follow up for a diagnostic/therapeutic left hip intra-articular corticosteroid injection with ultrasound guidance for treatment purposes (sooner if needed; call direct clinic number [[phone]] at any time with questions/concerns)

## 2018-09-07 NOTE — Clinical Note
Fatuma-  Please send a copy of my note to Dr. Linsey Gutiérrez at [facility]  Lux

## 2018-09-07 NOTE — MR AVS SNAPSHOT
After Visit Summary   9/7/2018    Lissa Lucero    MRN: [id number]           Patient Information     Date Of Birth          1950        Visit Information        Provider Department      9/7/2018 10:00 AM Lux Noyola [facility] Physical Medicine and Rehabilitation        Today's Diagnoses     Chronic left hip pain    -  1      Care Instructions    We addressed the following today:    1. Left hip pain    Activity modification as discussed  Home exercise program as instructed  Physical therapy: [facility]  Topical Treatments: Ice or Heat  Over the counter medication: Acetaminophen (Tylenol) 1000mg every 6 hours with food (Maximum of 3000mg/day)  Follow up for a diagnostic/therapeutic left hip intra-articular corticosteroid injection with ultrasound guidance for treatment purposes (sooner if needed; call direct clinic number [[phone]] at any time with questions/concerns)            Follow-ups after your visit        Additional Services     PT Referral (External Facility)       Physical Therapy Location:  [facility]    Treatment: Formal physical therapy - exercises to include quadriceps/hamstring strengthening along with hip abductor/adductor/flexor/extensor/internal rotator/external rotator strengthening/stretching with targeting of functional tasks of daily living including gait and balance training with use of modalities as needed with home exercise prescription.                  Who to contact     Please call your clinic at [phone] to:    Ask questions about your health    Make or cancel appointments    Discuss your medicines    Learn about your test results    Speak to your doctor            Additional Information About Your Visit        [email or web address] Information     [email or web address] is an electronic gateway that provides easy, online access to your medical records. With Travel.ru, you can request a clinic appointment, read your test results, renew a prescription or communicate with your care  "team.     To sign up for [employer] Herot visit the website at [email or web address]   You will be asked to enter the access code listed below, as well as some personal information. Please follow the directions to create your username and password.     Your access code is: [email or web address]  Expires: 2018  6:31 AM     Your access code will  in 90 days. If you need help or a new code, please contact your [facility] or call [phone] for assistance.        Care EveryWhere ID     This is your Care EveryWhere ID. This could be used by other organizations to access your [place] medical records  [id number]        Your Vitals Were     Pulse Temperature Respirations Height Pulse Oximetry Breastfeeding?    68 98.4  F (36.9  C) (Oral) 17 1.626 m (5' 4\") 97% No    BMI (Body Mass Index)                   25.83 kg/m2            Blood Pressure from Last 3 Encounters:   18 124/77   18 146/57   16 118/58    Weight from Last 3 Encounters:   18 68.3 kg (150 lb 8 oz)   18 68.6 kg (151 lb 4.8 oz)              We Performed the Following     PT Referral (External Facility)        Primary Care Provider Office Phone # Fax #    Linsey Gutiérrez [phone]       [address] [place]  [facility] [address]        Equal Access to Services     SUZIE VITALE AH: Hadii sagrario castellanoso Sonatalia, waaxda luqadaha, qaybta kaalmada mary, ching norris. So [facility] [phone].    ATENCIÓN: Si habla español, tiene a christianson disposición servicios gratuitos de asistencia lingüística. Alan al [phone].    We comply with applicable federal civil rights laws and [place] laws. We do not discriminate on the basis of race, color, national origin, age, disability, sex, sexual orientation, or gender identity.            Thank you!     Thank you for choosing [facility] PHYSICAL MEDICINE AND REHABILITATION  for your care. Our goal " is always to provide you with excellent care. Hearing back from our patients is one way we can continue to improve our services. Please take a few minutes to complete the written survey that you may receive in the mail after your visit with us. Thank you!             Your Updated Medication List - Protect others around you: Learn how to safely use, store and throw away your medicines at www.disposemymeds.org.          This list is accurate as of 9/7/18 10:43 AM.  Always use your most recent med list.                   Brand Name Dispense Instructions for use Diagnosis    ACETAMINOPHEN PO      Take 325 mg by mouth every 4 hours as needed for pain        ASPIRIN PO      Take 81 mg by mouth daily        cycloSPORINE 0.05 % ophthalmic emulsion    RESTASIS     Place 1 drop into both eyes 2 times daily        doxycycline monohydrate 50 MG capsule     90 capsule    Take 1 capsule (50 mg) by mouth daily    Blepharitis of upper and lower eyelids of both eyes, unspecified type, Conjunctivochalasis, bilateral, Sicca syndrome (H)       FLEXERIL PO      Take 10 mg by mouth as needed for muscle spasms        gabapentin 600 MG tablet    NEURONTIN     Take 1,200 mg by mouth 3 times daily        levothyroxine 112 MCG tablet    SYNTHROID/LEVOTHROID     Take 112 mcg by mouth daily        METOPROLOL TARTRATE PO      Take 25 mg by mouth        MULTIVITAMIN PO      Take by mouth daily        NIFEDICAL XL 30 MG 24 hr tablet   Generic drug:  NIFEdipine ER osmotic      Take 30 mg by mouth daily        SIMVASTATIN PO      Take 20 mg by mouth At Bedtime        ZANTAC PO      Take 150 mg by mouth daily

## 2018-09-07 NOTE — LETTER
"9/7/2018       RE: Lissa Lucero  [address]  [facility] [address]     Dear Colleague,    Thank you for referring your patient, Lissa Lucero, to the [facility] PHYSICAL MEDICINE AND REHABILITATION at [facility]. Please see a copy of my visit note below.    [facility] Physical Medicine & Rehabilitation Clinic Note  Clinic Visit s Sep 7, 2018    Subjective:  Lissa Lucero is a 68 year old female who is seen in consultation at the request of Dr. Linsey Gutiérrez for evaluation of chronic left hip pain.    Symptoms began 1 year ago.  Reports insidious onset without acute precipitating event.  Reports left hip pain that is located anterior with radiation absent.  Symptoms are generally worse with walking activities for long periods of time.  Treatment has consisted of physical therapy ([facility] - multiple sessions - discontinued in 6/2018) and Acetaminophen.  Denies any numbness/tingling/weakness of the left lower extremity.  Denies any previous left hip injuries/surgeries.    Patient's past medical, surgical, social, and family histories were reviewed today.  There are no significant contributory medical issues  Past Medical History:   Diagnosis Date     Benign essential hypertension 9/7/2018     Objective:  /77  Pulse 68  Temp 98.4  F (36.9  C) (Oral)  Resp 17  Ht 1.626 m (5' 4\")  Wt 68.3 kg (150 lb 8 oz)  SpO2 97%  Breastfeeding? No  BMI 25.83 kg/m2  General: healthy, alert, and in mild distress  Skin: no suspicious lesions or rashes  Psych: mentation appears normal and affect normal/bright  HEENT: no scleral icterus  CV: no pedal edema  Resp: normal respiratory effort without conversational dyspnea   Neuro: sensory exam is within normal limits.  Motor strength as noted below  Lymph: no palpable lymphadenopathy    MSK:    LEFT HIP  Inspection:    No swelling, bruising, discoloration, or obvious deformity or " asymmetry  Palpation:    Tender about the anterior groin/joint line    No tenderness over the greater trochanteric region or ASIS    Crepitus is absent  Passive Range of Motion:    Flexion within normal limits with pain / IR within normal limits with pain / ER within normal limits limited by pain  Strength:    Flexion 5/5 with pain / abduction 5/5  Special Tests:    Positive: QUENTIN, anterior impingement (FADIR)    Negative: Logroll, resisted gluteus medius provocation, modified Luis, and passive ER with hip flexion (Drehman's)    Imaging:  No x-rays indicated during today's visit  Outside report from [facility] and results were discussed with the patient  Bilateral hip x-rays - 8/27/2018  FINDINGS: Negative hip. No fracture or dislocation. Postop changes partially visualized in the lower lumbar spine.    ASSESSMENT:  1. Chronic left hip pain -differential diagnoses includes hip osteoarthrosis, labral degeneration/tear, etc.    PLAN:  1. Medical records release form signed to obtain outside bilateral hip x-ray completed at [facility] in August 2018.  2. Referral back to formal physical therapy - exercises to include quadriceps/hamstring strengthening along with hip abductor/adductor/flexor/extensor/internal rotator/external rotator strengthening/stretching with targeting of functional tasks of daily living including gait and balance training with use of modalities as needed with home exercise prescription.  3. Activity modification as discussed, including limitation of activities that cause pain/discomfort.  4. Acetaminophen/ice/heat as needed for improved pain control.  5. Follow-up for a diagnostic/therapeutic left hip intra-articular corticosteroid injection with ultrasound guidance for further treatment purposes.  Consider an MR arthrogram of the left hip, etc. as deemed appropriate moving forward.    I spent a total of 25 minutes face-to-face with the patient during today's office visit.  Over 50% of the  time was spent counseling the patient and/or coordinating care.  See office note for details.    Again, thank you for allowing me to participate in the care of your patient.      Sincerely,    Lux Noyola, DO

## 2018-09-07 NOTE — PROGRESS NOTES
"[facility] Physical Medicine & Rehabilitation Clinic Note  Clinic Visit s Sep 7, 2018    Subjective:  Lissa Lucero is a 68 year old female who is seen in consultation at the request of Dr. Linsey Gutiérrez for evaluation of chronic left hip pain.    Symptoms began 1 year ago.  Reports insidious onset without acute precipitating event.  Reports left hip pain that is located anterior with radiation absent.  Symptoms are generally worse with walking activities for long periods of time.  Treatment has consisted of physical therapy ([facility] - multiple sessions - discontinued in 6/2018) and Acetaminophen.  Denies any numbness/tingling/weakness of the left lower extremity.  Denies any previous left hip injuries/surgeries.    Patient's past medical, surgical, social, and family histories were reviewed today.  There are no significant contributory medical issues  Past Medical History:   Diagnosis Date     Benign essential hypertension 9/7/2018     Objective:  /77  Pulse 68  Temp 98.4  F (36.9  C) (Oral)  Resp 17  Ht 1.626 m (5' 4\")  Wt 68.3 kg (150 lb 8 oz)  SpO2 97%  Breastfeeding? No  BMI 25.83 kg/m2  General: healthy, alert, and in mild distress  Skin: no suspicious lesions or rashes  Psych: mentation appears normal and affect normal/bright  HEENT: no scleral icterus  CV: no pedal edema  Resp: normal respiratory effort without conversational dyspnea   Neuro: sensory exam is within normal limits.  Motor strength as noted below  Lymph: no palpable lymphadenopathy    MSK:    LEFT HIP  Inspection:    No swelling, bruising, discoloration, or obvious deformity or asymmetry  Palpation:    Tender about the anterior groin/joint line    No tenderness over the greater trochanteric region or ASIS    Crepitus is absent  Passive Range of Motion:    Flexion within normal limits with pain / IR within normal limits with pain / ER within normal limits limited by pain  Strength:    Flexion 5/5 " with pain / abduction 5/5  Special Tests:    Positive: QUENTIN, anterior impingement (FADIR)    Negative: Logroll, resisted gluteus medius provocation, modified Luis, and passive ER with hip flexion (Drehman's)    Imaging:  No x-rays indicated during today's visit  Outside report from [facility] and results were discussed with the patient  Bilateral hip x-rays - 8/27/2018  FINDINGS: Negative hip. No fracture or dislocation. Postop changes partially visualized in the lower lumbar spine.    ASSESSMENT:  1. Chronic left hip pain -differential diagnoses includes hip osteoarthrosis, labral degeneration/tear, etc.    PLAN:  1. Medical records release form signed to obtain outside bilateral hip x-ray completed at [facility] in August 2018.  2. Referral back to formal physical therapy - exercises to include quadriceps/hamstring strengthening along with hip abductor/adductor/flexor/extensor/internal rotator/external rotator strengthening/stretching with targeting of functional tasks of daily living including gait and balance training with use of modalities as needed with home exercise prescription.  3. Activity modification as discussed, including limitation of activities that cause pain/discomfort.  4. Acetaminophen/ice/heat as needed for improved pain control.  5. Follow-up for a diagnostic/therapeutic left hip intra-articular corticosteroid injection with ultrasound guidance for further treatment purposes.  Consider an MR arthrogram of the left hip, etc. as deemed appropriate moving forward.    I spent a total of 25 minutes face-to-face with the patient during today's office visit.  Over 50% of the time was spent counseling the patient and/or coordinating care.  See office note for details.    Lux Noyola DO, MANISHM    Department of Rehabilitation Medicine

## 2018-09-07 NOTE — NURSING NOTE
Chief Complaint   Patient presents with     RECHECK     [facility] RETURN, LEFT GROIN ISSUES F/U     Frank Griggs, EMT

## 2018-09-08 ASSESSMENT — PATIENT HEALTH QUESTIONNAIRE - PHQ9: SUM OF ALL RESPONSES TO PHQ QUESTIONS 1-9: 0

## 2018-09-18 ENCOUNTER — OFFICE VISIT - HEALTHEAST (OUTPATIENT)
Dept: FAMILY MEDICINE | Facility: CLINIC | Age: 68
End: 2018-09-18

## 2018-09-18 ENCOUNTER — COMMUNICATION - HEALTHEAST (OUTPATIENT)
Dept: FAMILY MEDICINE | Facility: CLINIC | Age: 68
End: 2018-09-18

## 2018-09-18 ENCOUNTER — RECORDS - HEALTHEAST (OUTPATIENT)
Dept: GENERAL RADIOLOGY | Facility: CLINIC | Age: 68
End: 2018-09-18

## 2018-09-18 DIAGNOSIS — G43.909 MIGRAINE: ICD-10-CM

## 2018-09-18 DIAGNOSIS — M25.512 ACUTE PAIN OF LEFT SHOULDER DUE TO TRAUMA: ICD-10-CM

## 2018-09-18 DIAGNOSIS — M25.512 PAIN IN LEFT SHOULDER: ICD-10-CM

## 2018-09-18 DIAGNOSIS — G60.9 HEREDITARY AND IDIOPATHIC PERIPHERAL NEUROPATHY: ICD-10-CM

## 2018-09-18 DIAGNOSIS — G89.11 ACUTE PAIN DUE TO TRAUMA: ICD-10-CM

## 2018-09-18 DIAGNOSIS — G89.11 ACUTE PAIN OF LEFT SHOULDER DUE TO TRAUMA: ICD-10-CM

## 2018-09-22 ENCOUNTER — COMMUNICATION - HEALTHEAST (OUTPATIENT)
Dept: FAMILY MEDICINE | Facility: CLINIC | Age: 68
End: 2018-09-22

## 2018-09-27 ENCOUNTER — HOSPITAL ENCOUNTER (OUTPATIENT)
Dept: MRI IMAGING | Facility: CLINIC | Age: 68
Discharge: HOME | End: 2018-09-27

## 2018-09-27 DIAGNOSIS — M25.512 ACUTE PAIN OF LEFT SHOULDER DUE TO TRAUMA: ICD-10-CM

## 2018-09-27 DIAGNOSIS — G89.11 ACUTE PAIN OF LEFT SHOULDER DUE TO TRAUMA: ICD-10-CM

## 2018-09-28 ENCOUNTER — AMBULATORY - HEALTHEAST (OUTPATIENT)
Dept: FAMILY MEDICINE | Facility: CLINIC | Age: 68
End: 2018-09-28

## 2018-09-28 DIAGNOSIS — S43.439A SLAP (SUPERIOR LABRUM FROM ANTERIOR TO POSTERIOR) TEAR: ICD-10-CM

## 2018-10-01 ENCOUNTER — RECORDS - HEALTHEAST (OUTPATIENT)
Dept: ADMINISTRATIVE | Facility: OTHER | Age: 68
End: 2018-10-01

## 2018-10-11 ENCOUNTER — COMMUNICATION - HEALTHEAST (OUTPATIENT)
Dept: FAMILY MEDICINE | Facility: CLINIC | Age: 68
End: 2018-10-11

## 2018-10-11 DIAGNOSIS — H01.00A BLEPHARITIS OF UPPER AND LOWER EYELIDS OF BOTH EYES, UNSPECIFIED TYPE: ICD-10-CM

## 2018-10-11 DIAGNOSIS — H01.00B BLEPHARITIS OF UPPER AND LOWER EYELIDS OF BOTH EYES, UNSPECIFIED TYPE: ICD-10-CM

## 2018-10-11 DIAGNOSIS — M35.00 SICCA SYNDROME (H): ICD-10-CM

## 2018-10-11 DIAGNOSIS — H11.823 CONJUNCTIVOCHALASIS, BILATERAL: ICD-10-CM

## 2018-10-11 NOTE — TELEPHONE ENCOUNTER
[facility] Call Center    Phone Message    May a detailed message be left on voicemail: yes    Reason for Call: Pharmacy called about getting a refill for the RX doxycycline Monohydrate 50 MG CAPS capsule. Pharmacy is [facility] [address] [place] [address] [id number] AT [facility] [address]    Action Taken: Message routed to:  [facility] E[facility]

## 2018-10-12 RX ORDER — DOXYCYCLINE 50 MG/1
50 CAPSULE ORAL DAILY
Qty: 30 CAPSULE | Refills: 0 | Status: SHIPPED | OUTPATIENT
Start: 2018-10-12 | End: 2018-11-09

## 2018-10-12 NOTE — TELEPHONE ENCOUNTER
[facility] Call Center    Phone Message    May a detailed message be left on voicemail: yes    Reason for Call: Medication Refill Request    Has the patient contacted the pharmacy for the refill? Yes - Pharmacy called us.  Name of medication being requested: doxycycline Monohydrate 50 MG CAPS   Provider who prescribed the medication: Dr. Cuello  Pharmacy: [facility] [place] [address], [facility] [address]  Date medication is needed: Pt is at the pharmacy waiting.         Action Taken: Message routed to:  [facility]

## 2018-10-12 NOTE — TELEPHONE ENCOUNTER
Medication: doxycycline Monohydrate 50 MG CAPS capsule    Requested directions Take 1 capsule (50 mg) by mouth daily  Current directions on the medication list Take 1 capsule (50 mg) by mouth daily    Last Written Prescription Date:  10-4-17  Last Fill Quantity: 90,   # refills: 3    Last Office Visit: 10-4-17  Future Office visit: 11-5-17  Patient was to RTC in January 2018    Attending Provider: Dr MANJIT Cuello  Last Clinic Note:   Progress Notes      CC: S/p conjunctivochalasis excision OU     HPI: 68yo F referred by Dr. Josey Chau evaluated for dryness and conjunctivochalasis.     Now POM#6 s/p Conjunctivochalasis excision OU     Doing really well per patient. Vision great. Reports tearing when in wind. Eyes are sometimes irrtated.  History of improved symptoms on doxycycline 100 mg daily     POHx:  S/p conjunctivochalasis excision OU 4/11/17     Gtts:   Restasis BID OU   AT PRN (2-3x/day)     A/P:     1. Moderate conjunctivochalasis both eyes s/p excision OU 4/11/17  - doing well  - conj well healed  - dry eyes OU  - encouraged using PFAT Q2-4 hrs, start refresh PM ointment QHS  - continue flax seed oil  - reports dry mouth in addition to dry eyes: consider work up with Sjogrens  - consider adding doxycycline 100 mg daily     2. Posterior blepharitis both eyes     3. Pseudophakia both eyes     RTC 3 months.             30 day supply given to get to next appointment.      Kathleen M Doege RN

## 2018-10-29 ENCOUNTER — COMMUNICATION - HEALTHEAST (OUTPATIENT)
Dept: FAMILY MEDICINE | Facility: CLINIC | Age: 68
End: 2018-10-29

## 2018-11-01 ENCOUNTER — OFFICE VISIT - HEALTHEAST (OUTPATIENT)
Dept: FAMILY MEDICINE | Facility: CLINIC | Age: 68
End: 2018-11-01

## 2018-11-01 DIAGNOSIS — M81.0 AGE-RELATED OSTEOPOROSIS WITHOUT CURRENT PATHOLOGICAL FRACTURE: ICD-10-CM

## 2018-11-01 DIAGNOSIS — E03.9 HYPOTHYROID: ICD-10-CM

## 2018-11-01 DIAGNOSIS — Z23 FLU VACCINE NEED: ICD-10-CM

## 2018-11-01 DIAGNOSIS — M85.9 LOW BONE DENSITY: ICD-10-CM

## 2018-11-01 DIAGNOSIS — Z00.00 ROUTINE GENERAL MEDICAL EXAMINATION AT A HEALTH CARE FACILITY: ICD-10-CM

## 2018-11-01 LAB — HBA1C MFR BLD: 5.7 % (ref 3.5–6)

## 2018-11-01 ASSESSMENT — MIFFLIN-ST. JEOR: SCORE: 1184

## 2018-11-02 LAB — TSH SERPL DL<=0.005 MIU/L-ACNC: 16.4 UIU/ML (ref 0.3–5)

## 2018-11-05 ENCOUNTER — AMBULATORY - HEALTHEAST (OUTPATIENT)
Dept: FAMILY MEDICINE | Facility: CLINIC | Age: 68
End: 2018-11-05

## 2018-11-05 ENCOUNTER — OFFICE VISIT (OUTPATIENT)
Dept: OPHTHALMOLOGY | Facility: CLINIC | Age: 68
End: 2018-11-05
Attending: OPHTHALMOLOGY
Payer: MEDICARE

## 2018-11-05 DIAGNOSIS — H01.00B BLEPHARITIS OF UPPER AND LOWER EYELIDS OF BOTH EYES, UNSPECIFIED TYPE: Primary | ICD-10-CM

## 2018-11-05 DIAGNOSIS — E03.9 HYPOTHYROIDISM: ICD-10-CM

## 2018-11-05 DIAGNOSIS — M35.00 SICCA SYNDROME (H): ICD-10-CM

## 2018-11-05 DIAGNOSIS — H01.00A BLEPHARITIS OF UPPER AND LOWER EYELIDS OF BOTH EYES, UNSPECIFIED TYPE: Primary | ICD-10-CM

## 2018-11-05 DIAGNOSIS — H11.823 CONJUNCTIVOCHALASIS, BILATERAL: ICD-10-CM

## 2018-11-05 PROCEDURE — G0463 HOSPITAL OUTPT CLINIC VISIT: HCPCS | Mod: ZF

## 2018-11-05 PROCEDURE — 92015 DETERMINE REFRACTIVE STATE: CPT | Mod: GY,ZF

## 2018-11-05 ASSESSMENT — REFRACTION_WEARINGRX
OS_ADD: +2.50
OS_AXIS: 137
OS_ADD: +2.75
OD_AXIS: 015
SPECS_TYPE: PAL
OD_CYLINDER: +0.50
OS_HPRISM: 3BI
OD_HPRISM: 3BI
SPECS_TYPE: PAL
OS_CYLINDER: +0.75
OD_AXIS: 020
OD_CYLINDER: +1.00
OS_CYLINDER: +0.50
OS_SPHERE: -1.75
OD_ADD: +2.50
OD_SPHERE: -1.00
OD_SPHERE: -1.75
OD_ADD: +2.75
OS_AXIS: 175
OS_SPHERE: -1.50

## 2018-11-05 ASSESSMENT — REFRACTION_MANIFEST
OS_CYLINDER: +1.00
OD_ADD: +2.75
OS_SPHERE: -1.75
OD_CYLINDER: +1.00
OS_ADD: +2.75
OD_SPHERE: -1.00
OS_AXIS: 155
OD_AXIS: 020

## 2018-11-05 ASSESSMENT — VISUAL ACUITY
OD_PH_CC: 20/20-2
OD_CC+: -1
OS_CC+: -3
CORRECTION_TYPE: GLASSES
OS_CC: 20/20
OD_CC: 20/30
METHOD: SNELLEN - LINEAR

## 2018-11-05 ASSESSMENT — CONF VISUAL FIELD
OD_NORMAL: 1
OS_NORMAL: 1
METHOD: COUNTING FINGERS

## 2018-11-05 ASSESSMENT — TONOMETRY
OD_IOP_MMHG: 10
IOP_METHOD: ICARE
OS_IOP_MMHG: 11

## 2018-11-05 NOTE — PROGRESS NOTES
CC: S/p conjunctivochalasis excision OU    HPI: 68yo F referred by Dr. Josey Chau evaluated for dryness and conjunctivochalasis.    Now POM#9 s/p Conjunctivochalasis excision OU    Not happy with new glasses.  Would like the per scription for the old glasses that would help her see better.  Eyes have felt occasionally irritated.  More light sensitive.    POHx:  S/p conjunctivochalasis excision OU 4/11/17    Gtts:   Restasis BID OU   AT PRN (2-3x/day)    A/P:    1. Moderate conjunctivochalasis both eyes s/p excision OU 4/11/17  - doing well  - conj well healed  - dry eyes OU  - encouraged using PFAT's as needed, at least 4x a day  - continue flax seed oil    2. Posterior blepharitis both eyes    3. Pseudophakia both eyes  - The results of the refraction was discussed with the patient and a new glasses prescription was provided    RTC 1 year, DFE OU    Emmanuel Pedersen M.D.  PGY-3, Ophthalmology -    Attending Physician Attestation:  Complete documentation of historical and exam elements from today's encounter can be found in the full encounter summary report (not reduplicated in this progress note).  I personally obtained the chief complaint(s) and history of present illness.  I confirmed and edited as necessary the review of systems, past medical/surgical history, family history, social history, and examination findings as documented by others; and I examined the patient myself.  I personally reviewed the relevant tests, images, and reports as documented above.  I formulated and edited as necessary the assessment and plan and discussed the findings and management plan with the patient and family. - Maik Cuello MD

## 2018-11-05 NOTE — MR AVS SNAPSHOT
After Visit Summary   11/5/2018    Lissa Lucero    MRN: [id number]           Patient Information     Date Of Birth          1950        Visit Information        Provider Department      11/5/2018 1:15 PM Maik Cuello MD [facility]        Today's Diagnoses     Blepharitis of upper and lower eyelids of both eyes, unspecified type - Both Eyes    -  1    Conjunctivochalasis, bilateral - Both Eyes        Sicca syndrome (H)           Follow-ups after your visit        Follow-up notes from your care team     Return in about 1 year (around 11/5/2019) for Follow Up, Vision, Pressure.      Who to contact     Please call your clinic at [phone] to:    Ask questions about your health    Make or cancel appointments    Discuss your medicines    Learn about your test results    Speak to your doctor            Additional Information About Your Visit        Care EveryWhere ID     This is your Care EveryWhere ID. This could be used by other organizations to access your [place] medical records  [id number]         Blood Pressure from Last 3 Encounters:   09/07/18 124/77   01/23/18 146/57   01/07/16 118/58    Weight from Last 3 Encounters:   09/07/18 68.3 kg (150 lb 8 oz)   01/23/18 68.6 kg (151 lb 4.8 oz)              Today, you had the following     No orders found for display       Primary Care Provider Office Phone # Fax #    Linsey Gutiérrez [phone]       [facility] [address]  [facility] [address]        Equal Access to Services     SUZIE VITALE AH: Hadii aad ku hadasho Solloydali, waaxda luqadaha, qaybta kaalmada adeegyada, ching norris. So [facility] [phone].    ATENCIÓN: Si habla español, tiene a christianson disposición servicios gratuitos de asistencia lingüística. Llame al [phone].    We comply with applicable federal civil rights laws and [place] laws. We do not discriminate on the basis of race, color, national origin,  age, disability, sex, sexual orientation, or gender identity.            Thank you!     Thank you for choosing EYE CLINIC  for your care. Our goal is always to provide you with excellent care. Hearing back from our patients is one way we can continue to improve our services. Please take a few minutes to complete the written survey that you may receive in the mail after your visit with us. Thank you!             Your Updated Medication List - Protect others around you: Learn how to safely use, store and throw away your medicines at www.disposemymeds.org.          This list is accurate as of 11/5/18  2:49 PM.  Always use your most recent med list.                   Brand Name Dispense Instructions for use Diagnosis    ACETAMINOPHEN PO      Take 325 mg by mouth every 4 hours as needed for pain        ASPIRIN PO      Take 81 mg by mouth daily        cycloSPORINE 0.05 % ophthalmic emulsion    RESTASIS     Place 1 drop into both eyes 2 times daily        doxycycline monohydrate 50 MG capsule     30 capsule    Take 1 capsule (50 mg) by mouth daily    Blepharitis of upper and lower eyelids of both eyes, unspecified type, Conjunctivochalasis, bilateral, Sicca syndrome (H)       FLEXERIL PO      Take 10 mg by mouth as needed for muscle spasms        gabapentin 600 MG tablet    NEURONTIN     Take 1,200 mg by mouth 3 times daily        hypromellose 0.5 % Soln ophthalmic solution    ARTIFICIAL TEARS     1 drop as needed        levothyroxine 112 MCG tablet    SYNTHROID/LEVOTHROID     Take 112 mcg by mouth daily        METOPROLOL TARTRATE PO      Take 25 mg by mouth        MULTIVITAMIN PO      Take by mouth daily        NIFEDICAL XL 30 MG 24 hr tablet   Generic drug:  NIFEdipine ER osmotic      Take 30 mg by mouth daily        SIMVASTATIN PO      Take 20 mg by mouth At Bedtime        ZANTAC PO      Take 150 mg by mouth daily

## 2018-11-05 NOTE — NURSING NOTE
Chief Complaints and History of Present Illnesses   Patient presents with     Follow Up For     1 year f/u s/p Conjunctivochalasis excision OU     HPI    Affected eye(s):  Both   Symptoms:     No floaters   No flashes   Redness   Tearing   Dryness         Do you have eye pain now?:  No      Comments:  Pt here for a yearly f/u s/p Conjunctivochalasis excision BE. Pt states her most current glasses is not working for her. She likes the rx of an older pair of glasses that help her see a bit better. Pt notes still doing all her drops since last visit. Pt c/o hard to read some books due to the print and contrast of the background of the book for over the last  6-9 months. Pt does note redness, tearing, and dryness in BE especially in the inner corner of LE x last few months. Pt also c/o being more light sensitive to the sun since this past summer.     Celia Winter, COMT 1:33 PM November 5, 2018

## 2018-11-07 ENCOUNTER — AMBULATORY - HEALTHEAST (OUTPATIENT)
Dept: FAMILY MEDICINE | Facility: CLINIC | Age: 68
End: 2018-11-07

## 2018-11-09 DIAGNOSIS — M35.00 SICCA SYNDROME (H): ICD-10-CM

## 2018-11-09 DIAGNOSIS — H01.00A BLEPHARITIS OF UPPER AND LOWER EYELIDS OF BOTH EYES, UNSPECIFIED TYPE: ICD-10-CM

## 2018-11-09 DIAGNOSIS — H01.00B BLEPHARITIS OF UPPER AND LOWER EYELIDS OF BOTH EYES, UNSPECIFIED TYPE: ICD-10-CM

## 2018-11-09 DIAGNOSIS — H11.823 CONJUNCTIVOCHALASIS, BILATERAL: ICD-10-CM

## 2018-11-09 NOTE — TELEPHONE ENCOUNTER
doxycycline monohydrate 50 MG capsule:      Last Written Prescription Date:  10-12-18  Last Fill Quantity: 30,   # refills: 0  Last Office Visit : 11-5-18  Future Office visit:  None  Attending: [place]  Last Clinic note:  A/P: 1. Moderate conjunctivochalasis both eyes s/p excision OU 4/11/17  - doing well  - conj well healed  - dry eyes OU  - encouraged using PFAT's as needed, at least 4x a day  - continue flax seed oil     2. Posterior blepharitis both eyes     3. Pseudophakia both eyes  - The results of the refraction was discussed with the patient and a new glasses prescription was provided     RTC 1 year, DFE OU    Routing refill request to provider for review/approval because:  Plan for doxycycline- refill or discontinue?

## 2018-11-12 ENCOUNTER — RECORDS - HEALTHEAST (OUTPATIENT)
Dept: ADMINISTRATIVE | Facility: OTHER | Age: 68
End: 2018-11-12

## 2018-11-12 RX ORDER — DOXYCYCLINE 50 MG/1
50 CAPSULE ORAL DAILY
Qty: 30 CAPSULE | Refills: 3 | Status: SHIPPED | OUTPATIENT
Start: 2018-11-12 | End: 2019-03-20

## 2018-11-20 ENCOUNTER — RECORDS - HEALTHEAST (OUTPATIENT)
Dept: BONE DENSITY | Facility: CLINIC | Age: 68
End: 2018-11-20

## 2018-11-20 ENCOUNTER — RECORDS - HEALTHEAST (OUTPATIENT)
Dept: ADMINISTRATIVE | Facility: OTHER | Age: 68
End: 2018-11-20

## 2018-11-20 ENCOUNTER — COMMUNICATION - HEALTHEAST (OUTPATIENT)
Dept: FAMILY MEDICINE | Facility: CLINIC | Age: 68
End: 2018-11-20

## 2018-11-20 ENCOUNTER — HOSPITAL ENCOUNTER (OUTPATIENT)
Dept: MAMMOGRAPHY | Facility: CLINIC | Age: 68
Discharge: HOME | End: 2018-11-20
Attending: FAMILY MEDICINE

## 2018-11-20 DIAGNOSIS — M81.0 AGE-RELATED OSTEOPOROSIS WITHOUT CURRENT PATHOLOGICAL FRACTURE: ICD-10-CM

## 2018-11-20 DIAGNOSIS — Z12.31 VISIT FOR SCREENING MAMMOGRAM: ICD-10-CM

## 2018-11-30 ENCOUNTER — AMBULATORY - HEALTHEAST (OUTPATIENT)
Dept: FAMILY MEDICINE | Facility: CLINIC | Age: 68
End: 2018-11-30

## 2018-11-30 DIAGNOSIS — Z91.89 DRIVING SAFETY ISSUE: ICD-10-CM

## 2018-12-04 ENCOUNTER — COMMUNICATION - HEALTHEAST (OUTPATIENT)
Dept: FAMILY MEDICINE | Facility: CLINIC | Age: 68
End: 2018-12-04

## 2018-12-10 ENCOUNTER — COMMUNICATION - HEALTHEAST (OUTPATIENT)
Dept: FAMILY MEDICINE | Facility: CLINIC | Age: 68
End: 2018-12-10

## 2018-12-10 DIAGNOSIS — I10 HYPERTENSION: ICD-10-CM

## 2019-01-04 ENCOUNTER — AMBULATORY - HEALTHEAST (OUTPATIENT)
Dept: NURSING | Facility: CLINIC | Age: 69
End: 2019-01-04

## 2019-01-04 DIAGNOSIS — Z23 NEED FOR SHINGLES VACCINE: ICD-10-CM

## 2019-01-21 ENCOUNTER — COMMUNICATION - HEALTHEAST (OUTPATIENT)
Dept: FAMILY MEDICINE | Facility: CLINIC | Age: 69
End: 2019-01-21

## 2019-02-06 ENCOUNTER — COMMUNICATION - HEALTHEAST (OUTPATIENT)
Dept: ADMINISTRATIVE | Facility: CLINIC | Age: 69
End: 2019-02-06

## 2019-03-19 DIAGNOSIS — H01.00B BLEPHARITIS OF UPPER AND LOWER EYELIDS OF BOTH EYES, UNSPECIFIED TYPE: ICD-10-CM

## 2019-03-19 DIAGNOSIS — H01.00A BLEPHARITIS OF UPPER AND LOWER EYELIDS OF BOTH EYES, UNSPECIFIED TYPE: ICD-10-CM

## 2019-03-19 DIAGNOSIS — H11.823 CONJUNCTIVOCHALASIS, BILATERAL: ICD-10-CM

## 2019-03-19 DIAGNOSIS — M35.00 SICCA SYNDROME (H): ICD-10-CM

## 2019-03-19 NOTE — TELEPHONE ENCOUNTER
doxycycline monohydrate (MONODOX) 50 MG capsule      Last Written Prescription Date:  11-12-18  Last Fill Quantity: 30,   # refills: 3  Last Office Visit : 11-5-18  Future Office visit:  none  Attending:Cuate  Last Clinic Note:Med not included      Routing refill request to provider for review/approval because:  Med not included in last note/plan

## 2019-03-20 RX ORDER — DOXYCYCLINE 50 MG/1
50 CAPSULE ORAL DAILY
Qty: 30 CAPSULE | Refills: 3 | Status: SHIPPED | OUTPATIENT
Start: 2019-03-20 | End: 2019-07-10

## 2019-03-20 RX ORDER — DOXYCYCLINE 50 MG/1
50 CAPSULE ORAL DAILY
Qty: 30 CAPSULE | Refills: 7 | OUTPATIENT
Start: 2019-03-20

## 2019-03-20 NOTE — TELEPHONE ENCOUNTER
3-20-19 Pharmacy notified of refill refused.  FYI to Clinic RN to update pt.  Damion Estrada RN, MD Elliott, Michael Steven, MD 19 hours ago (3:47 PM)      Sure we can get her off it    Routing Comment       Jakob Quintero MD Mammo, Danny Abdulrahim, MD 20 hours ago (3:24 PM)      Looks like she was seen in [place] for blepharitis in 11/2018. No mention of Doxy at that visit. Refill or refuse?     Jakob     Routing Comment

## 2019-03-25 ENCOUNTER — COMMUNICATION - HEALTHEAST (OUTPATIENT)
Dept: FAMILY MEDICINE | Facility: CLINIC | Age: 69
End: 2019-03-25

## 2019-03-27 ENCOUNTER — OFFICE VISIT - HEALTHEAST (OUTPATIENT)
Dept: FAMILY MEDICINE | Facility: CLINIC | Age: 69
End: 2019-03-27

## 2019-03-27 DIAGNOSIS — G60.9 HEREDITARY AND IDIOPATHIC PERIPHERAL NEUROPATHY: ICD-10-CM

## 2019-03-27 DIAGNOSIS — M54.5 CHRONIC LOW BACK PAIN, UNSPECIFIED BACK PAIN LATERALITY, WITH SCIATICA PRESENCE UNSPECIFIED: ICD-10-CM

## 2019-03-27 DIAGNOSIS — R73.9 HYPERGLYCEMIA: ICD-10-CM

## 2019-03-27 DIAGNOSIS — G89.29 CHRONIC LOW BACK PAIN, UNSPECIFIED BACK PAIN LATERALITY, WITH SCIATICA PRESENCE UNSPECIFIED: ICD-10-CM

## 2019-03-27 DIAGNOSIS — I10 ESSENTIAL HYPERTENSION, BENIGN: ICD-10-CM

## 2019-03-27 DIAGNOSIS — E03.9 HYPOTHYROIDISM, UNSPECIFIED TYPE: ICD-10-CM

## 2019-03-27 DIAGNOSIS — E78.00 PURE HYPERCHOLESTEROLEMIA: ICD-10-CM

## 2019-03-27 LAB — HBA1C MFR BLD: 5.8 % (ref 3.5–6)

## 2019-03-27 ASSESSMENT — MIFFLIN-ST. JEOR: SCORE: 1174.93

## 2019-03-28 LAB
ANION GAP SERPL CALCULATED.3IONS-SCNC: 9 MMOL/L (ref 5–18)
BUN SERPL-MCNC: 11 MG/DL (ref 8–28)
CALCIUM SERPL-MCNC: 10.1 MG/DL (ref 8.5–10.5)
CHLORIDE BLD-SCNC: 103 MMOL/L (ref 98–107)
CHOLEST SERPL-MCNC: 181 MG/DL
CO2 SERPL-SCNC: 30 MMOL/L (ref 22–31)
CREAT SERPL-MCNC: 0.72 MG/DL (ref 0.6–1.1)
FASTING STATUS PATIENT QL REPORTED: NO
GFR SERPL CREATININE-BSD FRML MDRD: >60 ML/MIN/1.73M2
GLUCOSE BLD-MCNC: 105 MG/DL (ref 70–125)
HDLC SERPL-MCNC: 74 MG/DL
LDLC SERPL CALC-MCNC: 73 MG/DL
POTASSIUM BLD-SCNC: 4.1 MMOL/L (ref 3.5–5)
SODIUM SERPL-SCNC: 142 MMOL/L (ref 136–145)
TRIGL SERPL-MCNC: 171 MG/DL
TSH SERPL DL<=0.005 MIU/L-ACNC: 0.03 UIU/ML (ref 0.3–5)

## 2019-04-01 ENCOUNTER — RECORDS - HEALTHEAST (OUTPATIENT)
Dept: ADMINISTRATIVE | Facility: OTHER | Age: 69
End: 2019-04-01

## 2019-04-18 ENCOUNTER — COMMUNICATION - HEALTHEAST (OUTPATIENT)
Dept: FAMILY MEDICINE | Facility: CLINIC | Age: 69
End: 2019-04-18

## 2019-05-08 ENCOUNTER — COMMUNICATION - HEALTHEAST (OUTPATIENT)
Dept: FAMILY MEDICINE | Facility: CLINIC | Age: 69
End: 2019-05-08

## 2019-05-08 DIAGNOSIS — E78.00 PURE HYPERCHOLESTEROLEMIA: ICD-10-CM

## 2019-05-13 ENCOUNTER — OFFICE VISIT - HEALTHEAST (OUTPATIENT)
Dept: FAMILY MEDICINE | Facility: CLINIC | Age: 69
End: 2019-05-13

## 2019-05-13 DIAGNOSIS — Z12.11 SCREEN FOR COLON CANCER: ICD-10-CM

## 2019-05-13 DIAGNOSIS — F51.01 PRIMARY INSOMNIA: ICD-10-CM

## 2019-05-13 DIAGNOSIS — E03.8 OTHER SPECIFIED HYPOTHYROIDISM: ICD-10-CM

## 2019-05-13 DIAGNOSIS — F43.10 PTSD (POST-TRAUMATIC STRESS DISORDER): ICD-10-CM

## 2019-05-13 LAB — TSH SERPL DL<=0.005 MIU/L-ACNC: 2.05 UIU/ML (ref 0.3–5)

## 2019-05-14 ENCOUNTER — COMMUNICATION - HEALTHEAST (OUTPATIENT)
Dept: FAMILY MEDICINE | Facility: CLINIC | Age: 69
End: 2019-05-14

## 2019-05-15 ENCOUNTER — COMMUNICATION - HEALTHEAST (OUTPATIENT)
Dept: FAMILY MEDICINE | Facility: CLINIC | Age: 69
End: 2019-05-15

## 2019-05-24 ENCOUNTER — COMMUNICATION - HEALTHEAST (OUTPATIENT)
Dept: FAMILY MEDICINE | Facility: CLINIC | Age: 69
End: 2019-05-24

## 2019-05-24 DIAGNOSIS — I25.119 CORONARY ARTERY DISEASE INVOLVING NATIVE CORONARY ARTERY OF NATIVE HEART WITH ANGINA PECTORIS (H): ICD-10-CM

## 2019-05-29 ENCOUNTER — COMMUNICATION - HEALTHEAST (OUTPATIENT)
Dept: FAMILY MEDICINE | Facility: CLINIC | Age: 69
End: 2019-05-29

## 2019-05-29 DIAGNOSIS — I25.119 CORONARY ARTERY DISEASE INVOLVING NATIVE CORONARY ARTERY OF NATIVE HEART WITH ANGINA PECTORIS (H): ICD-10-CM

## 2019-07-10 ENCOUNTER — OFFICE VISIT (OUTPATIENT)
Dept: OPHTHALMOLOGY | Facility: CLINIC | Age: 69
End: 2019-07-10
Attending: OPHTHALMOLOGY
Payer: MEDICARE

## 2019-07-10 DIAGNOSIS — H01.00B BLEPHARITIS OF UPPER AND LOWER EYELIDS OF BOTH EYES, UNSPECIFIED TYPE: ICD-10-CM

## 2019-07-10 DIAGNOSIS — M35.00 SICCA SYNDROME (H): ICD-10-CM

## 2019-07-10 DIAGNOSIS — H11.823 CONJUNCTIVOCHALASIS, BILATERAL: ICD-10-CM

## 2019-07-10 DIAGNOSIS — L71.9 BLEPHARITIS OF BOTH EYES WITH ROSACEA: Chronic | ICD-10-CM

## 2019-07-10 DIAGNOSIS — H01.00A BLEPHARITIS OF UPPER AND LOWER EYELIDS OF BOTH EYES, UNSPECIFIED TYPE: ICD-10-CM

## 2019-07-10 DIAGNOSIS — H01.003 BLEPHARITIS OF BOTH EYES WITH ROSACEA: Chronic | ICD-10-CM

## 2019-07-10 DIAGNOSIS — H01.006 BLEPHARITIS OF BOTH EYES WITH ROSACEA: Chronic | ICD-10-CM

## 2019-07-10 PROCEDURE — G0463 HOSPITAL OUTPT CLINIC VISIT: HCPCS | Mod: ZF

## 2019-07-10 RX ORDER — CYCLOSPORINE 0.5 MG/ML
1 EMULSION OPHTHALMIC EVERY 12 HOURS
Qty: 5.5 ML | Refills: 3 | Status: SHIPPED | OUTPATIENT
Start: 2019-07-10 | End: 2023-03-23

## 2019-07-10 RX ORDER — CYCLOSPORINE 0.5 MG/ML
1 EMULSION OPHTHALMIC 2 TIMES DAILY
Qty: 6 BOX | Refills: 11 | Status: CANCELLED | OUTPATIENT
Start: 2019-07-10

## 2019-07-10 RX ORDER — DOXYCYCLINE 50 MG/1
50 CAPSULE ORAL DAILY
Qty: 30 CAPSULE | Refills: 3 | Status: SHIPPED | OUTPATIENT
Start: 2019-07-10 | End: 2019-11-19

## 2019-07-10 ASSESSMENT — REFRACTION_WEARINGRX
OD_AXIS: 015
OD_ADD: +2.75
OS_ADD: +2.50
OS_SPHERE: -1.75
OS_HPRISM: 3BI
OD_AXIS: 020
OD_ADD: +2.50
OS_ADD: +2.75
OS_SPHERE: -1.50
OS_AXIS: 175
OD_HPRISM: 3BI
SPECS_TYPE: PAL
OD_SPHERE: -1.75
SPECS_TYPE: PAL
OD_SPHERE: -1.00
OS_CYLINDER: +0.75
OD_CYLINDER: +1.00
OS_AXIS: 137
OD_CYLINDER: +0.50
OS_CYLINDER: +0.50

## 2019-07-10 ASSESSMENT — CONF VISUAL FIELD
METHOD: COUNTING FINGERS
OD_NORMAL: 1
OS_NORMAL: 1

## 2019-07-10 ASSESSMENT — VISUAL ACUITY
OD_CC: 20/20
OS_CC: 20/20
OS_CC+: -1
METHOD: SNELLEN - LINEAR
CORRECTION_TYPE: GLASSES

## 2019-07-10 ASSESSMENT — TONOMETRY
OS_IOP_MMHG: 13
OD_IOP_MMHG: 14
IOP_METHOD: TONOPEN

## 2019-07-10 ASSESSMENT — CUP TO DISC RATIO
OS_RATIO: 0.3
OD_RATIO: 0.3

## 2019-07-10 NOTE — PROGRESS NOTES
CC: S/p conjunctivochalasis excision OU    HPI: 68yo F initially referred by Dr. Josey Chau evaluated for dryness and conjunctivochalasis.    Post op 2 years Conjunctivochalasis excision each eye, and without complaints.  Pt is doing very well with added doxycycline therapy. She is no longer needing flax seed oil, warm compresses, or eyelid scrubs.   No tearing, redness, pain. Daily to every other day FBS resolves with AT.     POHx:  S/p conjunctivochalasis excision OU 4/11/17    Oph meds:   Restasis BID OU   AT PRN (2-3x/day)  Doxycycline 50mg once daily    A/P:    1. Moderate conjunctivochalasis both eyes s/p excision OU 4/11/17  - doing well  - dry eyes OU  - encouraged to continue PFAT's as needed, at least 4x a day    2. Posterior blepharitis both eyes, improved  - continue doxycycline  - recommend lid hygiene    3. Pseudophakia both eyes  -  She is happy with her current pair of glasses which have prism.    4. Convergence insufficiency  -  She is happy with her current pair of glasses which have prism.    RTC 1 year, DFE each eye  Send restasis and doxy rx's    --  Korina Saha, DO  PGY 5, [place] Fellow  Ophthalmology    Attending Physician Attestation:  Complete documentation of historical and exam elements from today's encounter can be found in the full encounter summary report (not reduplicated in this progress note).  I personally obtained the chief complaint(s) and history of present illness.  I confirmed and edited as necessary the review of systems, past medical/surgical history, family history, social history, and examination findings as documented by others; and I examined the patient myself.  I personally reviewed the relevant tests, images, and reports as documented above.  I formulated and edited as necessary the assessment and plan and discussed the findings and management plan with the patient and family. - Maik Cuello MD

## 2019-07-10 NOTE — NURSING NOTE
Chief Complaints and History of Present Illnesses   Patient presents with     Annual Eye Exam     Chief Complaint(s) and History of Present Illness(es)     Annual Eye Exam               Comments     Annual eye exam for Moderate conjunctivochalasis both eyes s/p excision OU 4/11/17.  The patient would like our clinic to prescribe Restasis.  The patient notes that the humidity bothers her eyes. She does well in [place] or [place].  The patient doesn't have eye pain or vision change since her last visit.  NOLVIA Mckeon 12:51 PM 07/10/2019

## 2019-07-10 NOTE — PATIENT INSTRUCTIONS
Continue doxycycline 50mg by mouth once a day - take with food.  Continue restasis twice a day in both eyes.   New refills have been sent to your pharmacy today.    I recommend you begin warm compresses and eyelid scrubs as needed if you have fluctuating dry eye symptoms. Otherwise may just continue to use Preservative free artificial tears as needed.    Follow up annually or sooner if needed.

## 2019-07-18 ENCOUNTER — COMMUNICATION - HEALTHEAST (OUTPATIENT)
Dept: FAMILY MEDICINE | Facility: CLINIC | Age: 69
End: 2019-07-18

## 2019-07-22 ENCOUNTER — COMMUNICATION - HEALTHEAST (OUTPATIENT)
Dept: FAMILY MEDICINE | Facility: CLINIC | Age: 69
End: 2019-07-22

## 2019-07-22 DIAGNOSIS — E03.9 HYPOTHYROIDISM, UNSPECIFIED TYPE: ICD-10-CM

## 2019-08-19 ENCOUNTER — RECORDS - HEALTHEAST (OUTPATIENT)
Dept: ADMINISTRATIVE | Facility: OTHER | Age: 69
End: 2019-08-19

## 2019-08-25 ENCOUNTER — COMMUNICATION - HEALTHEAST (OUTPATIENT)
Dept: FAMILY MEDICINE | Facility: CLINIC | Age: 69
End: 2019-08-25

## 2019-08-25 DIAGNOSIS — I25.119 CORONARY ARTERY DISEASE INVOLVING NATIVE CORONARY ARTERY OF NATIVE HEART WITH ANGINA PECTORIS (H): ICD-10-CM

## 2019-08-28 ENCOUNTER — RECORDS - HEALTHEAST (OUTPATIENT)
Dept: ADMINISTRATIVE | Facility: OTHER | Age: 69
End: 2019-08-28

## 2019-09-09 ENCOUNTER — COMMUNICATION - HEALTHEAST (OUTPATIENT)
Dept: FAMILY MEDICINE | Facility: CLINIC | Age: 69
End: 2019-09-09

## 2019-09-09 DIAGNOSIS — I10 HYPERTENSION: ICD-10-CM

## 2019-10-23 ENCOUNTER — OFFICE VISIT - HEALTHEAST (OUTPATIENT)
Dept: FAMILY MEDICINE | Facility: CLINIC | Age: 69
End: 2019-10-23

## 2019-10-23 DIAGNOSIS — E03.9 ACQUIRED HYPOTHYROIDISM: ICD-10-CM

## 2019-10-23 DIAGNOSIS — S09.90XA INJURY OF HEAD, INITIAL ENCOUNTER: ICD-10-CM

## 2019-10-23 DIAGNOSIS — Z23 FLU VACCINE NEED: ICD-10-CM

## 2019-10-23 LAB — TSH SERPL DL<=0.005 MIU/L-ACNC: 3.32 UIU/ML (ref 0.3–5)

## 2019-10-23 ASSESSMENT — MIFFLIN-ST. JEOR: SCORE: 1174.93

## 2019-10-24 ENCOUNTER — COMMUNICATION - HEALTHEAST (OUTPATIENT)
Dept: FAMILY MEDICINE | Facility: CLINIC | Age: 69
End: 2019-10-24

## 2019-11-01 ENCOUNTER — COMMUNICATION - HEALTHEAST (OUTPATIENT)
Dept: TELEHEALTH | Facility: CLINIC | Age: 69
End: 2019-11-01

## 2019-11-18 ENCOUNTER — COMMUNICATION - HEALTHEAST (OUTPATIENT)
Dept: FAMILY MEDICINE | Facility: CLINIC | Age: 69
End: 2019-11-18

## 2019-11-18 DIAGNOSIS — G60.9 HEREDITARY AND IDIOPATHIC PERIPHERAL NEUROPATHY: ICD-10-CM

## 2019-11-19 DIAGNOSIS — H01.00B BLEPHARITIS OF UPPER AND LOWER EYELIDS OF BOTH EYES, UNSPECIFIED TYPE: ICD-10-CM

## 2019-11-19 DIAGNOSIS — H11.823 CONJUNCTIVOCHALASIS, BILATERAL: ICD-10-CM

## 2019-11-19 DIAGNOSIS — H01.00A BLEPHARITIS OF UPPER AND LOWER EYELIDS OF BOTH EYES, UNSPECIFIED TYPE: ICD-10-CM

## 2019-11-19 DIAGNOSIS — M35.00 SICCA SYNDROME (H): ICD-10-CM

## 2019-11-19 RX ORDER — DOXYCYCLINE 50 MG/1
50 CAPSULE ORAL DAILY
Qty: 30 CAPSULE | Refills: 8 | Status: SHIPPED | OUTPATIENT
Start: 2019-11-19 | End: 2023-03-23

## 2019-11-19 NOTE — TELEPHONE ENCOUNTER
Last Clinic Visit: 7/10/19 with recommended RTC in 1 year.  Last clinic note: continue doxycycline

## 2019-11-21 ENCOUNTER — COMMUNICATION - HEALTHEAST (OUTPATIENT)
Dept: FAMILY MEDICINE | Facility: CLINIC | Age: 69
End: 2019-11-21

## 2019-11-21 DIAGNOSIS — I25.119 CORONARY ARTERY DISEASE INVOLVING NATIVE CORONARY ARTERY OF NATIVE HEART WITH ANGINA PECTORIS (H): ICD-10-CM

## 2020-01-06 ENCOUNTER — RECORDS - HEALTHEAST (OUTPATIENT)
Dept: ADMINISTRATIVE | Facility: OTHER | Age: 70
End: 2020-01-06

## 2020-01-17 ENCOUNTER — RECORDS - HEALTHEAST (OUTPATIENT)
Dept: ADMINISTRATIVE | Facility: OTHER | Age: 70
End: 2020-01-17

## 2020-01-24 ENCOUNTER — RECORDS - HEALTHEAST (OUTPATIENT)
Dept: ADMINISTRATIVE | Facility: OTHER | Age: 70
End: 2020-01-24

## 2020-01-29 ENCOUNTER — RECORDS - HEALTHEAST (OUTPATIENT)
Dept: ADMINISTRATIVE | Facility: OTHER | Age: 70
End: 2020-01-29

## 2020-02-24 ENCOUNTER — DOCUMENTATION ONLY (OUTPATIENT)
Dept: OTHER | Facility: CLINIC | Age: 70
End: 2020-02-24

## 2020-02-24 ENCOUNTER — OFFICE VISIT - HEALTHEAST (OUTPATIENT)
Dept: FAMILY MEDICINE | Facility: CLINIC | Age: 70
End: 2020-02-24

## 2020-02-24 ENCOUNTER — AMBULATORY - HEALTHEAST (OUTPATIENT)
Dept: OTHER | Facility: CLINIC | Age: 70
End: 2020-02-24

## 2020-02-24 DIAGNOSIS — S01.319A: ICD-10-CM

## 2020-02-24 ASSESSMENT — MIFFLIN-ST. JEOR: SCORE: 1174.93

## 2020-02-28 ENCOUNTER — RECORDS - HEALTHEAST (OUTPATIENT)
Dept: ADMINISTRATIVE | Facility: OTHER | Age: 70
End: 2020-02-28

## 2020-03-09 ENCOUNTER — COMMUNICATION - HEALTHEAST (OUTPATIENT)
Dept: FAMILY MEDICINE | Facility: CLINIC | Age: 70
End: 2020-03-09

## 2020-03-09 DIAGNOSIS — I10 HYPERTENSION: ICD-10-CM

## 2020-03-25 ENCOUNTER — COMMUNICATION - HEALTHEAST (OUTPATIENT)
Dept: SCHEDULING | Facility: CLINIC | Age: 70
End: 2020-03-25

## 2020-04-28 ENCOUNTER — COMMUNICATION - HEALTHEAST (OUTPATIENT)
Dept: FAMILY MEDICINE | Facility: CLINIC | Age: 70
End: 2020-04-28

## 2020-04-28 ENCOUNTER — AMBULATORY - HEALTHEAST (OUTPATIENT)
Dept: LAB | Facility: CLINIC | Age: 70
End: 2020-04-28

## 2020-04-28 DIAGNOSIS — E78.00 PURE HYPERCHOLESTEROLEMIA: ICD-10-CM

## 2020-04-28 DIAGNOSIS — E03.9 HYPOTHYROIDISM, UNSPECIFIED TYPE: ICD-10-CM

## 2020-04-28 LAB
T4 FREE SERPL-MCNC: 1 NG/DL (ref 0.7–1.8)
TSH SERPL DL<=0.005 MIU/L-ACNC: 1.19 UIU/ML (ref 0.3–5)

## 2020-05-04 ENCOUNTER — OFFICE VISIT - HEALTHEAST (OUTPATIENT)
Dept: FAMILY MEDICINE | Facility: CLINIC | Age: 70
End: 2020-05-04

## 2020-05-04 DIAGNOSIS — I25.119 CORONARY ARTERY DISEASE INVOLVING NATIVE CORONARY ARTERY OF NATIVE HEART WITH ANGINA PECTORIS (H): ICD-10-CM

## 2020-05-04 DIAGNOSIS — I10 ESSENTIAL HYPERTENSION, BENIGN: ICD-10-CM

## 2020-05-04 DIAGNOSIS — Z12.11 SPECIAL SCREENING FOR MALIGNANT NEOPLASMS, COLON: ICD-10-CM

## 2020-05-04 DIAGNOSIS — M89.9 DISORDER OF BONE AND CARTILAGE: ICD-10-CM

## 2020-05-04 DIAGNOSIS — E03.9 HYPOTHYROIDISM, UNSPECIFIED TYPE: ICD-10-CM

## 2020-05-04 DIAGNOSIS — G60.9 HEREDITARY AND IDIOPATHIC PERIPHERAL NEUROPATHY: ICD-10-CM

## 2020-05-04 DIAGNOSIS — F33.8 SEASONAL AFFECTIVE DISORDER (H): ICD-10-CM

## 2020-05-04 DIAGNOSIS — M94.9 DISORDER OF BONE AND CARTILAGE: ICD-10-CM

## 2020-05-04 DIAGNOSIS — E78.00 PURE HYPERCHOLESTEROLEMIA: ICD-10-CM

## 2020-06-04 ENCOUNTER — COMMUNICATION - HEALTHEAST (OUTPATIENT)
Dept: FAMILY MEDICINE | Facility: CLINIC | Age: 70
End: 2020-06-04

## 2020-06-05 ENCOUNTER — COMMUNICATION - HEALTHEAST (OUTPATIENT)
Dept: FAMILY MEDICINE | Facility: CLINIC | Age: 70
End: 2020-06-05

## 2020-06-05 DIAGNOSIS — I10 HYPERTENSION: ICD-10-CM

## 2020-06-06 ENCOUNTER — RECORDS - HEALTHEAST (OUTPATIENT)
Dept: ADMINISTRATIVE | Facility: OTHER | Age: 70
End: 2020-06-06

## 2020-06-06 LAB — COLOGUARD-ABSTRACT: NEGATIVE

## 2020-06-12 ENCOUNTER — OFFICE VISIT - HEALTHEAST (OUTPATIENT)
Dept: FAMILY MEDICINE | Facility: CLINIC | Age: 70
End: 2020-06-12

## 2020-06-12 DIAGNOSIS — S06.0X9D CONCUSSION WITH LOSS OF CONSCIOUSNESS, SUBSEQUENT ENCOUNTER: ICD-10-CM

## 2020-06-12 DIAGNOSIS — S02.2XXD CLOSED FRACTURE OF NASAL BONE WITH ROUTINE HEALING, SUBSEQUENT ENCOUNTER: ICD-10-CM

## 2020-06-12 DIAGNOSIS — S90.31XA CONTUSION OF RIGHT FOOT, INITIAL ENCOUNTER: ICD-10-CM
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
## 2020-06-12 DIAGNOSIS — G62.2: ICD-10-CM

## 2020-06-12 ASSESSMENT — MIFFLIN-ST. JEOR: SCORE: 1111.99

## 2020-06-18 ENCOUNTER — COMMUNICATION - HEALTHEAST (OUTPATIENT)
Dept: ADMINISTRATIVE | Facility: CLINIC | Age: 70
End: 2020-06-18

## 2020-06-22 ENCOUNTER — HOSPITAL ENCOUNTER (OUTPATIENT)
Dept: ULTRASOUND IMAGING | Facility: CLINIC | Age: 70
Discharge: HOME | End: 2020-06-22

## 2020-06-22 ENCOUNTER — COMMUNICATION - HEALTHEAST (OUTPATIENT)
Dept: FAMILY MEDICINE | Facility: CLINIC | Age: 70
End: 2020-06-22

## 2020-06-22 ENCOUNTER — OFFICE VISIT - HEALTHEAST (OUTPATIENT)
Dept: FAMILY MEDICINE | Facility: CLINIC | Age: 70
End: 2020-06-22

## 2020-06-22 DIAGNOSIS — I65.21 STENOSIS OF RIGHT CAROTID ARTERY: ICD-10-CM

## 2020-06-22 DIAGNOSIS — R73.9 HYPERGLYCEMIA: ICD-10-CM

## 2020-06-22 DIAGNOSIS — G62.2: ICD-10-CM

## 2020-06-22 DIAGNOSIS — G44.309 POST-CONCUSSION HEADACHE: ICD-10-CM

## 2020-06-22 LAB
ALBUMIN SERPL-MCNC: 4.2 G/DL (ref 3.5–5)
ALP SERPL-CCNC: 73 U/L (ref 45–120)
ALT SERPL W P-5'-P-CCNC: 15 U/L (ref 0–45)
ANION GAP SERPL CALCULATED.3IONS-SCNC: 13 MMOL/L (ref 5–18)
AST SERPL W P-5'-P-CCNC: 17 U/L (ref 0–40)
BILIRUB SERPL-MCNC: 0.3 MG/DL (ref 0–1)
BUN SERPL-MCNC: 9 MG/DL (ref 8–28)
CALCIUM SERPL-MCNC: 9.5 MG/DL (ref 8.5–10.5)
CHLORIDE BLD-SCNC: 102 MMOL/L (ref 98–107)
CHOLEST SERPL-MCNC: 176 MG/DL
CO2 SERPL-SCNC: 25 MMOL/L (ref 22–31)
CREAT SERPL-MCNC: 0.76 MG/DL (ref 0.6–1.1)
FASTING STATUS PATIENT QL REPORTED: ABNORMAL
GFR SERPL CREATININE-BSD FRML MDRD: >60 ML/MIN/1.73M2
GLUCOSE BLD-MCNC: 103 MG/DL (ref 70–125)
HBA1C MFR BLD: 5.7 % (ref 3.5–6)
HDLC SERPL-MCNC: 63 MG/DL
LDLC SERPL CALC-MCNC: 70 MG/DL
POTASSIUM BLD-SCNC: 4.6 MMOL/L (ref 3.5–5)
PROT SERPL-MCNC: 6.9 G/DL (ref 6–8)
SODIUM SERPL-SCNC: 140 MMOL/L (ref 136–145)
TRIGL SERPL-MCNC: 217 MG/DL

## 2020-06-24 ENCOUNTER — COMMUNICATION - HEALTHEAST (OUTPATIENT)
Dept: FAMILY MEDICINE | Facility: CLINIC | Age: 70
End: 2020-06-24

## 2020-06-26 ENCOUNTER — COMMUNICATION - HEALTHEAST (OUTPATIENT)
Dept: SCHEDULING | Facility: CLINIC | Age: 70
End: 2020-06-26

## 2020-06-26 ENCOUNTER — RECORDS - HEALTHEAST (OUTPATIENT)
Dept: HEALTH INFORMATION MANAGEMENT | Facility: CLINIC | Age: 70
End: 2020-06-26

## 2020-06-26 DIAGNOSIS — G44.309 POST-CONCUSSION HEADACHE: ICD-10-CM

## 2020-07-16 ENCOUNTER — OFFICE VISIT - HEALTHEAST (OUTPATIENT)
Dept: FAMILY MEDICINE | Facility: CLINIC | Age: 70
End: 2020-07-16

## 2020-07-16 DIAGNOSIS — I25.119 CORONARY ARTERY DISEASE INVOLVING NATIVE CORONARY ARTERY OF NATIVE HEART WITH ANGINA PECTORIS (H): ICD-10-CM

## 2020-07-16 DIAGNOSIS — J01.40 ACUTE NON-RECURRENT PANSINUSITIS: ICD-10-CM

## 2020-07-16 DIAGNOSIS — I10 ESSENTIAL HYPERTENSION, BENIGN: ICD-10-CM

## 2020-08-10 ENCOUNTER — OFFICE VISIT - HEALTHEAST (OUTPATIENT)
Dept: FAMILY MEDICINE | Facility: CLINIC | Age: 70
End: 2020-08-10

## 2020-08-10 ENCOUNTER — COMMUNICATION - HEALTHEAST (OUTPATIENT)
Dept: TELEHEALTH | Facility: CLINIC | Age: 70
End: 2020-08-10

## 2020-08-10 DIAGNOSIS — I10 ESSENTIAL HYPERTENSION, BENIGN: ICD-10-CM

## 2020-08-10 DIAGNOSIS — R00.1 SINUS BRADYCARDIA: ICD-10-CM

## 2020-08-10 DIAGNOSIS — G44.309 POST-CONCUSSION HEADACHE: ICD-10-CM

## 2020-08-10 DIAGNOSIS — R41.89 COGNITIVE CHANGE: ICD-10-CM

## 2020-08-10 DIAGNOSIS — R63.4 WEIGHT LOSS: ICD-10-CM

## 2020-08-10 LAB
BASOPHILS # BLD AUTO: 0 THOU/UL (ref 0–0.2)
BASOPHILS NFR BLD AUTO: 0 % (ref 0–2)
EOSINOPHIL # BLD AUTO: 0.1 THOU/UL (ref 0–0.4)
EOSINOPHIL NFR BLD AUTO: 2 % (ref 0–6)
ERYTHROCYTE [DISTWIDTH] IN BLOOD BY AUTOMATED COUNT: 12.2 % (ref 11–14.5)
HCT VFR BLD AUTO: 39.6 % (ref 35–47)
HGB BLD-MCNC: 14 G/DL (ref 12–16)
LYMPHOCYTES # BLD AUTO: 1.7 THOU/UL (ref 0.8–4.4)
LYMPHOCYTES NFR BLD AUTO: 34 % (ref 20–40)
MCH RBC QN AUTO: 33.1 PG (ref 27–34)
MCHC RBC AUTO-ENTMCNC: 35.3 G/DL (ref 32–36)
MCV RBC AUTO: 94 FL (ref 80–100)
MONOCYTES # BLD AUTO: 0.4 THOU/UL (ref 0–0.9)
MONOCYTES NFR BLD AUTO: 7 % (ref 2–10)
NEUTROPHILS # BLD AUTO: 2.8 THOU/UL (ref 2–7.7)
NEUTROPHILS NFR BLD AUTO: 56 % (ref 50–70)
PLATELET # BLD AUTO: 268 THOU/UL (ref 140–440)
PMV BLD AUTO: 8.2 FL (ref 7–10)
RBC # BLD AUTO: 4.22 MILL/UL (ref 3.8–5.4)
WBC: 5 THOU/UL (ref 4–11)

## 2020-08-11 ENCOUNTER — COMMUNICATION - HEALTHEAST (OUTPATIENT)
Dept: FAMILY MEDICINE | Facility: CLINIC | Age: 70
End: 2020-08-11

## 2020-08-18 ENCOUNTER — AMBULATORY - HEALTHEAST (OUTPATIENT)
Dept: FAMILY MEDICINE | Facility: CLINIC | Age: 70
End: 2020-08-18

## 2020-08-18 ENCOUNTER — COMMUNICATION - HEALTHEAST (OUTPATIENT)
Dept: FAMILY MEDICINE | Facility: CLINIC | Age: 70
End: 2020-08-18

## 2020-08-18 DIAGNOSIS — G44.309 POST-CONCUSSION HEADACHE: ICD-10-CM

## 2020-08-18 DIAGNOSIS — F43.21 ADJUSTMENT DISORDER WITH DEPRESSED MOOD: ICD-10-CM

## 2020-08-19 ENCOUNTER — COMMUNICATION - HEALTHEAST (OUTPATIENT)
Dept: FAMILY MEDICINE | Facility: CLINIC | Age: 70
End: 2020-08-19

## 2020-08-19 ENCOUNTER — AMBULATORY - HEALTHEAST (OUTPATIENT)
Dept: FAMILY MEDICINE | Facility: CLINIC | Age: 70
End: 2020-08-19

## 2020-08-19 DIAGNOSIS — F43.21 ADJUSTMENT DISORDER WITH DEPRESSED MOOD: ICD-10-CM

## 2020-08-19 DIAGNOSIS — G44.309 POST-CONCUSSION HEADACHE: ICD-10-CM

## 2020-08-20 ENCOUNTER — COMMUNICATION - HEALTHEAST (OUTPATIENT)
Dept: FAMILY MEDICINE | Facility: CLINIC | Age: 70
End: 2020-08-20

## 2020-08-21 ENCOUNTER — OFFICE VISIT - HEALTHEAST (OUTPATIENT)
Dept: FAMILY MEDICINE | Facility: CLINIC | Age: 70
End: 2020-08-21

## 2020-08-21 ENCOUNTER — COMMUNICATION - HEALTHEAST (OUTPATIENT)
Dept: FAMILY MEDICINE | Facility: CLINIC | Age: 70
End: 2020-08-21

## 2020-08-21 DIAGNOSIS — K52.9 FREQUENT STOOLS: ICD-10-CM

## 2020-08-21 DIAGNOSIS — G44.309 POST-CONCUSSION HEADACHE: ICD-10-CM

## 2020-08-21 DIAGNOSIS — F43.21 ADJUSTMENT DISORDER WITH DEPRESSED MOOD: ICD-10-CM

## 2020-08-21 DIAGNOSIS — J34.89 SINUS PRESSURE: ICD-10-CM

## 2020-08-31 ENCOUNTER — COMMUNICATION - HEALTHEAST (OUTPATIENT)
Dept: FAMILY MEDICINE | Facility: CLINIC | Age: 70
End: 2020-08-31

## 2020-09-01 ENCOUNTER — OFFICE VISIT - HEALTHEAST (OUTPATIENT)
Dept: FAMILY MEDICINE | Facility: CLINIC | Age: 70
End: 2020-09-01

## 2020-09-01 DIAGNOSIS — M79.641 PAIN OF RIGHT HAND: ICD-10-CM

## 2020-09-03 ENCOUNTER — COMMUNICATION - HEALTHEAST (OUTPATIENT)
Dept: FAMILY MEDICINE | Facility: CLINIC | Age: 70
End: 2020-09-03

## 2020-09-03 ENCOUNTER — OFFICE VISIT - HEALTHEAST (OUTPATIENT)
Dept: FAMILY MEDICINE | Facility: CLINIC | Age: 70
End: 2020-09-03

## 2020-09-03 DIAGNOSIS — L03.113 CELLULITIS OF RIGHT HAND: ICD-10-CM

## 2020-09-03 DIAGNOSIS — R19.7 DIARRHEA, UNSPECIFIED TYPE: ICD-10-CM

## 2020-09-03 DIAGNOSIS — R35.0 URINARY FREQUENCY: ICD-10-CM

## 2020-09-03 DIAGNOSIS — I10 ESSENTIAL HYPERTENSION, BENIGN: ICD-10-CM

## 2020-09-03 DIAGNOSIS — T23.261A PARTIAL THICKNESS BURN OF BACK OF RIGHT HAND, INITIAL ENCOUNTER: ICD-10-CM

## 2020-09-03 DIAGNOSIS — T50.905A ADVERSE EFFECT OF DRUG, INITIAL ENCOUNTER: ICD-10-CM

## 2020-09-03 DIAGNOSIS — R42 DIZZINESS: ICD-10-CM

## 2020-09-03 LAB
ALBUMIN UR-MCNC: NEGATIVE MG/DL
ANION GAP SERPL CALCULATED.3IONS-SCNC: 10 MMOL/L (ref 5–18)
APPEARANCE UR: CLEAR
BASOPHILS # BLD AUTO: 0 THOU/UL (ref 0–0.2)
BASOPHILS NFR BLD AUTO: 0 % (ref 0–2)
BILIRUB UR QL STRIP: NEGATIVE
BUN SERPL-MCNC: 5 MG/DL (ref 8–28)
CALCIUM SERPL-MCNC: 9.9 MG/DL (ref 8.5–10.5)
CHLORIDE BLD-SCNC: 101 MMOL/L (ref 98–107)
CO2 SERPL-SCNC: 26 MMOL/L (ref 22–31)
COLOR UR AUTO: YELLOW
CREAT SERPL-MCNC: 0.7 MG/DL (ref 0.6–1.1)
EOSINOPHIL # BLD AUTO: 0.1 THOU/UL (ref 0–0.4)
EOSINOPHIL NFR BLD AUTO: 2 % (ref 0–6)
ERYTHROCYTE [DISTWIDTH] IN BLOOD BY AUTOMATED COUNT: 11.4 % (ref 11–14.5)
GFR SERPL CREATININE-BSD FRML MDRD: >60 ML/MIN/1.73M2
GLUCOSE BLD-MCNC: 108 MG/DL (ref 70–125)
GLUCOSE UR STRIP-MCNC: NEGATIVE MG/DL
HCT VFR BLD AUTO: 40.1 % (ref 35–47)
HGB BLD-MCNC: 13.8 G/DL (ref 12–16)
HGB UR QL STRIP: NEGATIVE
KETONES UR STRIP-MCNC: NEGATIVE MG/DL
LEUKOCYTE ESTERASE UR QL STRIP: NEGATIVE
LYMPHOCYTES # BLD AUTO: 1.6 THOU/UL (ref 0.8–4.4)
LYMPHOCYTES NFR BLD AUTO: 33 % (ref 20–40)
MCH RBC QN AUTO: 33.6 PG (ref 27–34)
MCHC RBC AUTO-ENTMCNC: 34.4 G/DL (ref 32–36)
MCV RBC AUTO: 98 FL (ref 80–100)
MONOCYTES # BLD AUTO: 0.4 THOU/UL (ref 0–0.9)
MONOCYTES NFR BLD AUTO: 7 % (ref 2–10)
NEUTROPHILS # BLD AUTO: 2.7 THOU/UL (ref 2–7.7)
NEUTROPHILS NFR BLD AUTO: 57 % (ref 50–70)
NITRATE UR QL: NEGATIVE
PH UR STRIP: 7 [PH] (ref 5–8)
PLATELET # BLD AUTO: 273 THOU/UL (ref 140–440)
PMV BLD AUTO: 8.2 FL (ref 7–10)
POTASSIUM BLD-SCNC: 3.8 MMOL/L (ref 3.5–5)
RBC # BLD AUTO: 4.1 MILL/UL (ref 3.8–5.4)
SODIUM SERPL-SCNC: 137 MMOL/L (ref 136–145)
SP GR UR STRIP: 1.01 (ref 1–1.03)
UROBILINOGEN UR STRIP-ACNC: NORMAL
WBC: 4.8 THOU/UL (ref 4–11)

## 2020-09-04 ENCOUNTER — AMBULATORY - HEALTHEAST (OUTPATIENT)
Dept: LAB | Facility: CLINIC | Age: 70
End: 2020-09-04

## 2020-09-04 DIAGNOSIS — R19.7 DIARRHEA, UNSPECIFIED TYPE: ICD-10-CM

## 2020-09-05 LAB
SHIGA TOXIN 1: NEGATIVE
SHIGA TOXIN 2: NEGATIVE

## 2020-09-07 LAB — BACTERIA SPEC CULT: NORMAL

## 2020-09-08 ENCOUNTER — HOSPITAL ENCOUNTER (OUTPATIENT)
Dept: NEUROLOGY | Facility: CLINIC | Age: 70
Setting detail: THERAPIES SERIES
Discharge: STILL ADMITTED OR ADMINISTRATIVE | End: 2020-09-08
Attending: NURSE PRACTITIONER

## 2020-09-08 DIAGNOSIS — F07.81 POST CONCUSSION SYNDROME: ICD-10-CM

## 2020-09-08 DIAGNOSIS — H83.3X3 SOUND SENSITIVITY IN BOTH EARS: ICD-10-CM

## 2020-09-08 DIAGNOSIS — R41.840 ATTENTION AND CONCENTRATION DEFICIT: ICD-10-CM

## 2020-09-08 DIAGNOSIS — R11.0 NAUSEA: ICD-10-CM

## 2020-09-08 DIAGNOSIS — F06.4 ANXIETY DISORDER DUE TO MEDICAL CONDITION: ICD-10-CM

## 2020-09-08 DIAGNOSIS — R68.89 SENSITIVITY TO LIGHT: ICD-10-CM

## 2020-09-08 DIAGNOSIS — G47.00 INSOMNIA, UNSPECIFIED TYPE: ICD-10-CM

## 2020-09-08 DIAGNOSIS — R45.4 IRRITABILITY: ICD-10-CM

## 2020-09-08 DIAGNOSIS — R53.83 FATIGUE, UNSPECIFIED TYPE: ICD-10-CM

## 2020-09-08 DIAGNOSIS — R42 DIZZINESS: ICD-10-CM

## 2020-09-08 DIAGNOSIS — R41.3 MEMORY DIFFICULTIES: ICD-10-CM

## 2020-09-08 DIAGNOSIS — G44.309 POST-CONCUSSION HEADACHE: ICD-10-CM

## 2020-09-11 ENCOUNTER — OFFICE VISIT - HEALTHEAST (OUTPATIENT)
Dept: FAMILY MEDICINE | Facility: CLINIC | Age: 70
End: 2020-09-11

## 2020-09-11 DIAGNOSIS — Z00.00 ROUTINE ADULT HEALTH MAINTENANCE: ICD-10-CM

## 2020-09-11 DIAGNOSIS — R26.89 BALANCE PROBLEMS: ICD-10-CM

## 2020-09-11 DIAGNOSIS — G44.329 CHRONIC POST-TRAUMATIC HEADACHE, NOT INTRACTABLE: ICD-10-CM

## 2020-09-11 DIAGNOSIS — R42 POSITIONAL LIGHTHEADEDNESS: ICD-10-CM

## 2020-09-14 ENCOUNTER — COMMUNICATION - HEALTHEAST (OUTPATIENT)
Dept: FAMILY MEDICINE | Facility: CLINIC | Age: 70
End: 2020-09-14

## 2020-09-25 ENCOUNTER — RECORDS - HEALTHEAST (OUTPATIENT)
Dept: ADMINISTRATIVE | Facility: OTHER | Age: 70
End: 2020-09-25

## 2020-10-23 ENCOUNTER — TRANSFERRED RECORDS (OUTPATIENT)
Dept: HEALTH INFORMATION MANAGEMENT | Facility: CLINIC | Age: 70
End: 2020-10-23

## 2020-10-30 ENCOUNTER — COMMUNICATION - HEALTHEAST (OUTPATIENT)
Dept: FAMILY MEDICINE | Facility: CLINIC | Age: 70
End: 2020-10-30

## 2020-10-30 DIAGNOSIS — E03.9 HYPOTHYROIDISM, UNSPECIFIED TYPE: ICD-10-CM

## 2020-11-06 ENCOUNTER — COMMUNICATION - HEALTHEAST (OUTPATIENT)
Dept: FAMILY MEDICINE | Facility: CLINIC | Age: 70
End: 2020-11-06

## 2020-11-06 DIAGNOSIS — E78.00 PURE HYPERCHOLESTEROLEMIA: ICD-10-CM

## 2021-02-04 NOTE — PROGRESS NOTES
2021    TELEHEALTH EVALUATION -- Audio/Visual (During OMTGG-22 public health emergency)    HPI:    Rexanne Krabbe (:  1980) has requested an audio/video evaluation for the following concern(s):    Follow up of anxiety disorder. Overall, symptoms show no change.  Current symptoms: difficulty concentrating, feelings of losing control, insomnia, irritable, psychomotor agitation. He denies current suicidal and homicidal ideation. He complains of the following side effects from the treatment: Prozac made him more agitated.  Wellbutrin made him feel flat. He is currently feeling well on Luvox  mg daily     ADD/ADHD Symptoms:  Patient complains of a several year(s) history of ADD, including the following: fails to give close attention to details or makes careless mistakes in school, work, or other activities, has difficulty sustaining attention in tasks or play activities, has difficulty organizing tasks and activities, is easily distracted by extraneous stimuli, is often forgetful in daily activities and avoids engaging in tasks that require sustained attention. He has a known history of ADD/ADHD.  Patient denies hyperactivity, impulsivity, behavior problems, depressed mood, anhedonia, feelings of hopelessness.  Symptoms have been controlled.  Family history of ADD/ADHD: no.  Previous treatment:has included: Intuniv with good control     Hyperlipidemia: Patient presents with hyperlipidemia. He was tested because screening. His last labs showed Total cholesterol of 190, HDL 48, ,  Triglycerides 45. There is not a family history of hyperlipidemia. There is not a family history of early ischemia heart disease. Review of Systems   All other systems reviewed and are negative. Prior to Visit Medications    Medication Sig Taking?  Authorizing Provider   fluvoxaMINE (LUVOX CR) 100 MG CP24 extended release capsule Take 100 mg by mouth nightly  Tyrell William MD PT orders from Dr Noyola were faxed to [facility] in [place] [phone]. Patient given a copy of the orders with the phone number and address.   guanFACINE (INTUNIV) 2 MG TB24 extended release tablet Take 1 tablet by mouth daily  Kari Ramos MD   pravastatin (PRAVACHOL) 20 MG tablet Take 1 tablet by mouth daily  Kari Ramos MD       Social History     Tobacco Use    Smoking status: Never Smoker    Smokeless tobacco: Current User     Types: Chew   Substance Use Topics    Alcohol use: Yes     Frequency: Monthly or less     Drinks per session: 1 or 2     Binge frequency: Never    Drug use: No        Allergies   Allergen Reactions    Statins Other (See Comments)     Lipitor      Vicodin [Hydrocodone-Acetaminophen] Nausea Only   ,   Past Medical History:   Diagnosis Date    Acid reflux     Anxiety     Hyperlipidemia     Obesity    ,   Past Surgical History:   Procedure Laterality Date    HERNIA REPAIR      testicle    VARICOCELECTOMY Left 1999   ,   Social History     Tobacco Use    Smoking status: Never Smoker    Smokeless tobacco: Current User     Types: Chew   Substance Use Topics    Alcohol use: Yes     Frequency: Monthly or less     Drinks per session: 1 or 2     Binge frequency: Never    Drug use: No       PHYSICAL EXAMINATION:  [ INSTRUCTIONS:  \"[x]\" Indicates a positive item  \"[]\" Indicates a negative item  -- DELETE ALL ITEMS NOT EXAMINED]  Vital Signs: (As obtained by patient/caregiver or practitioner observation)    Not available     Constitutional: [x] Appears well-developed and well-nourished [x] No apparent distress      [] Abnormal-   Mental status  [x] Alert and awake  [x] Oriented to person/place/time [x]Able to follow commands      Eyes:  EOM    [x]  Normal  [] Abnormal-  Sclera  [x]  Normal  [] Abnormal -         Discharge [x]  None visible  [] Abnormal -    HENT:   [x] Normocephalic, atraumatic.   [] Abnormal   [x] Mouth/Throat: Mucous membranes are moist.     External Ears [x] Normal  [] Abnormal-     Neck: [x] No visualized mass Pulmonary/Chest: [x] Respiratory effort normal.  [x] No visualized signs of difficulty breathing or respiratory distress        [] Abnormal-                 Psychiatric:       [x] Normal Affect [x] No Hallucinations        [] Abnormal-         ASSESSMENT/PLAN:  1. Anxiety  Well-controlled  - fluvoxaMINE (LUVOX CR) 100 MG CP24 extended release capsule; Take 100 mg by mouth nightly  Dispense: 30 capsule; Refill: 5    2. Attention deficit disorder (ADD) without hyperactivity  Controlled  - guanFACINE (INTUNIV) 2 MG TB24 extended release tablet; Take 1 tablet by mouth daily  Dispense: 30 tablet; Refill: 5    3. Mixed hyperlipidemia  Asymptomatic  - pravastatin (PRAVACHOL) 20 MG tablet; Take 1 tablet by mouth daily  Dispense: 30 tablet; Refill: 5      Return in about 6 months (around 8/4/2021) for anxiety, ADD, hyperlipidemia. Domingo Edmonds is a 41 y.o. male being evaluated by a Virtual Visit (video visit) encounter to address concerns as mentioned above. A caregiver was present when appropriate. Due to this being a TeleHealth encounter (During [place] [address] public health emergency), evaluation of the following organ systems was limited: Vitals/Constitutional/EENT/Resp/CV/GI//MS/Neuro/Skin/Heme-Lymph-Imm. Pursuant to the emergency declaration under the [facility], [address] [place] [address] authority and the [address] and [employer] Act, this Virtual Visit was conducted with patient's (and/or legal guardian's) consent, to reduce the patient's risk of exposure to COVID-19 and provide necessary medical care. The patient (and/or legal guardian) has also been advised to contact this office for worsening conditions or problems, and seek emergency medical treatment and/or call 911 if deemed necessary.      Patient identification was verified at the start of the visit: Yes  Patient was at home during his virtual visit Total time spent on this encounter: Not billed by time    Services were provided through a video synchronous discussion virtually to substitute for in-person clinic visit. Patient and provider were located at their individual homes. --Dc Rdz MD on 2/4/2021 at 4:30 PM    An electronic signature was used to authenticate this note.

## 2021-05-26 ENCOUNTER — RECORDS - HEALTHEAST (OUTPATIENT)
Dept: ADMINISTRATIVE | Facility: CLINIC | Age: 71
End: 2021-05-26

## 2021-05-27 ENCOUNTER — RECORDS - HEALTHEAST (OUTPATIENT)
Dept: ADMINISTRATIVE | Facility: CLINIC | Age: 71
End: 2021-05-27

## 2021-05-27 NOTE — PROGRESS NOTES
Chief Complaint   Patient presents with     Medication Management     needs cyclobenzaprine refilled.      Thyroid Problem     Would like to check thyroid today.      HPI: Patient presents today for medication check.  Overall she is doing well.  Last fall I diagnosed her with a SLAP tear of her left labrum.  She continues with with orthopedics.  Initially they recommended surgical correction, but she has been getting by with steroid injections and has another scheduled for early next week.  Her range of motion is still quite intact, but she does still have continued pain issues.  She says that in February she went skydiving down in [place] and while this was a positive experience, she thinks that it did cause some strain on the shoulder.    She continues to experience episodes with lower back pain for which she takes cyclobenzaprine, primarily at night.  This allows her to sleep much more soundly.    Patient has a history of hypertension.  Well controlled today.  No chest pain, palpitation, lightheadedness, dizziness.  Elevated blood sugars in the past of yielded an A1c of 5.6%.  She is due to recheck her thyroid function today.     The patient makes mention that she had a reaction to her Shingrix vaccine causing fatigue and other odd symptoms.  This did eventually resolve itself spontaneously.  She plans to be traveling down to [facility] soon.    ROS:Review of Systems - History obtained from the patient  General ROS: negative  ENT ROS: negative  Allergy and Immunology ROS: negative  Respiratory ROS: negative  Cardiovascular ROS: negative  Musculoskeletal ROS: Positive for-shoulder pain  Neurological ROS: negative  Dermatological ROS: negative    SH: The Patient's  reports that  has never smoked. she has never used smokeless tobacco. She reports that she does not drink alcohol or use drugs.      FH: The Patient's family history includes Alzheimer's disease in her father; Dementia in her mother; Diabetes type II  "in her father; ROSA MARIA disease in her brother and sister; Heart disease in her father; Hypertension in her father and mother; Osteoporosis in her mother.     Meds:    Current Outpatient Medications on File Prior to Visit   Medication Sig Dispense Refill     acetaminophen (TYLENOL) 500 MG tablet Take 500-1,000 mg by mouth every 6 (six) hours as needed for pain.       aspirin 81 MG EC tablet Take 81 mg by mouth daily.       cycloSPORINE (RESTASIS) 0.05 % ophthalmic emulsion Administer 1 drop to both eyes 2 (two) times a day.       doxycycline (MONODOX) 50 MG capsule Take 50 mg by mouth daily.   3     gabapentin (NEURONTIN) 600 MG tablet TAKE TWO TABLETS BY MOUTH THREE TIMES A  tablet 3     levothyroxine (SYNTHROID) 88 MCG tablet Take 1 tablet (88 mcg total) by mouth daily. 90 tablet 3     metoprolol tartrate (LOPRESSOR) 25 MG tablet TAKE ONE TABLET BY MOUTH EVERY DAY AT BEDTIME 90 tablet 2     mirtazapine (REMERON) 7.5 MG tablet Take 1 tablet (7.5 mg total) by mouth at bedtime. 90 tablet 3     multivit-mineral-iron-lutein (CENTRUM SILVER ULTRA WOMEN'S) Tab Take 1 tablet by mouth daily.        NIFEdipine (PROCARDIA XL) 30 MG 24 hr tablet TAKE ONE TABLET BY MOUTH EVERY DAY 90 tablet 3     ranitidine (ZANTAC) 150 MG tablet Take 150 mg by mouth every morning.       simvastatin (ZOCOR) 20 MG tablet TAKE ONE TABLET BY MOUTH AT BEDTIME 90 tablet 3     [DISCONTINUED] cyclobenzaprine (FLEXERIL) 10 MG tablet TAKE ONE-HALF TO ONE TABLET BY MOUTH THREE TIMES A DAY AS NEEDED FOR MUSCLE SPASMS 30 tablet 1     ondansetron (ZOFRAN) 4 MG tablet Take 1 tablet (4 mg total) by mouth daily as needed for nausea. 30 tablet 1     No current facility-administered medications on file prior to visit.        O:  /64   Pulse 73   Temp 98.3  F (36.8  C) (Oral)   Ht 5' 4\" (1.626 m)   Wt 151 lb (68.5 kg)   LMP 10/12/1981   SpO2 97%   BMI 25.92 kg/m      Physical Examination:   General appearance - alert, well appearing, and in no " distress  Mental status - alert, oriented to person, place, and time  Eyes - pupils equal and reactive, extraocular eye movements intact  Ears - bilateral TM's and external ear canals normal  Nose - normal and patent, no erythema, discharge or polyps  Mouth - mucous membranes moist, pharynx normal without lesions  Neck - supple, no significant adenopathy  Lymphatics - no palpable lymphadenopathy, no hepatosplenomegaly  Chest - clear to auscultation, no wheezes, rales or rhonchi, symmetric air entry  Heart - normal rate and regular rhythm, S1 and S2 normal, no murmurs noted  Abdomen - soft, nontender, nondistended, no masses or organomegaly  Neurological - alert, oriented, normal speech, no focal findings or movement disorder noted, neck supple without rigidity, cranial nerves II through XII intact, motor and sensory grossly normal bilaterally, normal muscle tone, no tremors, strength 5/5  Musculoskeletal -good range of motion in the shoulder and no pain with palpation.  She does express that she does have discomfort, particularly with hyperextension of the shoulder.  Extremities - peripheral pulses normal, no pedal edema, no clubbing or cyanosis  Skin - normal coloration and turgor, no rashes, no suspicious skin lesions noted      A/P:     Problem List Items Addressed This Visit        ENT/CARD/PULM/ENDO Problems    Hypercholesterolemia    Relevant Orders    Lipid [place] RANDOM    Benign Essential Hypertension - Primary    Relevant Orders    Basic Metabolic Panel    Hypothyroid    Relevant Orders    Thyroid Stimulating Hormone (TSH)    Coronary artery disease involving native coronary artery of native heart with angina pectoris (H)       Other    Peripheral Neuropathy    Hyperglycemia    Relevant Orders    Glycosylated Hemoglobin A1c (Completed)    Chronic back pain    Relevant Medications    cyclobenzaprine (FLEXERIL) 10 MG tablet      Other Visit Diagnoses     Superior glenoid labrum lesion of left shoulder,  sequela                1. Benign Essential Hypertension  Controlled  - Basic Metabolic Panel    2. Peripheral Neuropathy  Long-standing issue.  Well controlled currently.    3. Hyperglycemia  Recheck A1c.  - Glycosylated Hemoglobin A1c    4. Chronic low back pain, unspecified back pain laterality, with sciatica presence unspecified  - cyclobenzaprine (FLEXERIL) 10 MG tablet; TAKE ONE-HALF TO ONE TABLET BY MOUTH THREE TIMES A DAY AS NEEDED FOR MUSCLE SPASMS  Dispense: 30 tablet; Refill: 1    5. Hypothyroidism, unspecified type  - Thyroid Stimulating Hormone (TSH)    6. Hypercholesterolemia  - Lipid [place] RANDOM        Ravi Brannon, CNP

## 2021-05-27 NOTE — TELEPHONE ENCOUNTER
Who is calling:  [facility] [employer]  Reason for Call:  Asking for NPI for Dr Gutiérrez for the Shingrex vaccine that was given.  Writer provided that information.  No follow up needed.   Date of last appointment with primary care: NA  Okay to leave a detailed message: No

## 2021-05-27 NOTE — TELEPHONE ENCOUNTER
RN cannot approve Refill Request    RN can NOT refill this medication med is not covered by policy/route to provider     . Last office visit: 9/18/2018 Ravi Brannon CNP Last Physical: Visit date not found Last MTM visit: Visit date not found Last visit same specialty: 11/1/2018 Linsey Gutiérrez MD.  Next visit within 3 mo: Visit date not found  Next physical within 3 mo: Visit date not found      Mariah Hopkins, [facility] Triage/Med Refill 3/25/2019    Requested Prescriptions   Pending Prescriptions Disp Refills     cyclobenzaprine (FLEXERIL) 10 MG tablet [Pharmacy Med Name: CYCLOBENZAPRINE HCL 10MG TABS] 30 tablet 1     Sig: TAKE ONE-HALF TO ONE TABLET BY MOUTH THREE TIMES A DAY AS NEEDED FOR MUSCLE SPASMS    There is no refill protocol information for this order

## 2021-05-27 NOTE — PATIENT INSTRUCTIONS - HE
Flexeril sent to your pharmacy.    We'll see you again in 6 months    Thank you for coming in today!    If you receive a survey from [employer] about your experience today, it would be very helpful if you could fill it out to let us know what went well and what we can improve!    General Information:    Today you had your appointment with Ravi Brannon NP    My hours are:    Monday : Out of clinic  Tuesday : 8:00AM - 5:00 PM  Wednesday: 8:00AM - 5:00 PM  Thursday: 8:00AM - 5:00 PM  Friday: 8:00AM - 5:00 PM    I am not in the office Mondays. Therefore non-urgent calls and medical messages received on Monday will be addressed when I am back in the office on Tuesday. Urgent matters will be reviewed and addressed by one of my partners in the office as needed.    If lab work was done today as part of your evaluation you will generally be contacted via [facility], mail, or phone with the results within 1-5 days. If there is an alarming result we will contact you by phone. Lab results come back at varying times, I generally wait until all lab results are available before making comments on the results.     If you need refills please contact your pharmacy. They will send a refill request to me to review. Please allow 3-5 business days for us to process all refill requests.     My Clinical Assistant is Brittany. Please call us at [phone] or send a medical message with any questions or concerns.

## 2021-05-28 ENCOUNTER — RECORDS - HEALTHEAST (OUTPATIENT)
Dept: ADMINISTRATIVE | Facility: CLINIC | Age: 71
End: 2021-05-28

## 2021-05-28 NOTE — TELEPHONE ENCOUNTER
Central PA team  [phone]  Pool: HE PA MED ([id number])          PA has been initiated.       PA form completed and faxed insurance via Cover My Meds     Key: [id number]     Medication:Mirtazapine 7.5MG tablets    Insurance:  [employer] Part D        Response will be received via fax and may take up to 5-10 business days depending on plan

## 2021-05-28 NOTE — TELEPHONE ENCOUNTER
Refill Approved    Rx renewed per Medication Renewal Policy. Medication was last renewed on 5/9/2018.  Last OV 3/27/2019.    Windy Christian, [facility] Triage/Med Refill 5/9/2019     Requested Prescriptions   Pending Prescriptions Disp Refills     simvastatin (ZOCOR) 20 MG tablet [Pharmacy Med Name: SIMVASTATIN 20MG TABS] 90 tablet 3     Sig: TAKE ONE TABLET BY MOUTH EVERY DAY AT BEDTIME       Statins Refill Protocol (Hmg CoA Reductase Inhibitors) Passed - 5/8/2019  8:17 PM        Passed - PCP or prescribing provider visit in past 12 months      Last office visit with prescriber/PCP: 11/1/2018 Linsey Gutiérrez MD OR same dept: 3/27/2019 Ravi Brannon CNP OR same specialty: 3/27/2019 Ravi Brannon CNP  Last physical: 5/8/2017 Last MTM visit: Visit date not found   Next visit within 3 mo: Visit date not found  Next physical within 3 mo: Visit date not found  Prescriber OR PCP: Linsey Gutiérrez MD  Last diagnosis associated with med order: 1. Hypercholesterolemia  - simvastatin (ZOCOR) 20 MG tablet [Pharmacy Med Name: SIMVASTATIN 20MG TABS]; TAKE ONE TABLET BY MOUTH EVERY DAY AT BEDTIME  Dispense: 90 tablet; Refill: 3    If protocol passes may refill for 12 months if within 3 months of last provider visit (or a total of 15 months).

## 2021-05-28 NOTE — TELEPHONE ENCOUNTER
Fax received from [facility] pharmacy requesting Prior Authorization    Medication Name:   mirtazapine (REMERON) 7.5 MG tablet 135 tablet 3 5/13/2019     Sig - Route: Take 1.5 tablets (11.25 mg total) by mouth at bedtime. - Oral          Insurance Plan: Aetna Part D   PBM:    Patient ID Number: [id number]    Please start PA process

## 2021-05-28 NOTE — TELEPHONE ENCOUNTER
Left message to call back for: Pt.   Information to relay to patient:  Dr. Meza's message below

## 2021-05-28 NOTE — TELEPHONE ENCOUNTER
Guaifenesin oral solution and syrup  What is this medicine?  GUAIFENESIN (gwye FEN e sin) is an expectorant. It helps to thin mucous and make coughs more productive. This medicine is used to treat coughs caused by colds or the flu. It is not intended to treat chronic cough caused by smoking, asthma, emphysema, or heart failure.  How should I use this medicine?  Take this medicine by mouth. Follow the directions on the prescription label. Use a specially marked spoon or container to measure your dose. Household spoons are not accurate. Take your medicine at regular intervals. Do not take it more often than directed.  Talk to your pediatrician regarding the use of this medicine in children. Special care may be needed.  What side effects may I notice from receiving this medicine?  Side effects that you should report to your doctor or health care professional as soon as possible:  · allergic reactions like skin rash, itching or hives, swelling of the face, lips, or tongue  Side effects that usually do not require medical attention (report to your doctor or health care professional if they continue or are bothersome):  · dizziness  · headache  · stomach upset  What may interact with this medicine?  Interactions are not expected.  What if I miss a dose?  If you miss a dose, take it as soon as you can. If it is almost time for your next dose, take only that dose. Do not take double or extra doses.  Where should I keep my medicine?  Keep out of the reach of children.  Store at room temperature between 20 and 25 degrees C (68 and 77 degrees F). Do not freeze. Keep container tightly closed. Throw away any unused medicine after the expiration date.  What should I tell my health care provider before I take this medicine?  They need to know if you have any of these conditions:  · diabetes  · fever  · kidney disease  · an unusual or allergic reaction to guaifenesin, other medicines, foods, dyes, or preservatives  · pregnant or  ----- Message from Devon Meza MD sent at 5/14/2019  1:09 PM CDT -----  Please call the patient and inform her that the TSH(thyroid test) is perfect.  Continue with the current levothyroxine at 75 mcg daily.  No change needed.  Thanks,  Dr. Meza   trying to get pregnant  · breast-feeding  What should I watch for while using this medicine?  Do not treat a cough for more than 1 week without consulting your doctor or health care professional. If you also have a high fever, skin rash, continuing headache, or sore throat, see your doctor.  For best results, drink 6 to 8 glasses water daily while you are taking this medicine.  NOTE:This sheet is a summary. It may not cover all possible information. If you have questions about this medicine, talk to your doctor, pharmacist, or health care provider. Copyright© 2017 [employer]

## 2021-05-28 NOTE — TELEPHONE ENCOUNTER
Left message to call back for: patient  Information to relay to patient:  What would she like to discuss?     Lab results are in chart from phone encounter 5/14/19.

## 2021-05-28 NOTE — PROGRESS NOTES
ASSESSMENT/PLAN:       1. Screen for colon cancer    - Ambulatory referral for Colonoscopy, will do later this fall    2. Other specified hypothyroidism    - Thyroid Stimulating Hormone (TSH)  Call patient with test results    3. PTSD (post-traumatic stress disorder)    - mirtazapine (REMERON) 7.5 MG tablet; Take 1.5 tablets (11.25 mg total) by mouth at bedtime.  Dispense: 135 tablet; Refill: 3  Hopefully over the next 3 to 4 weeks she will notice improvement in her anxiety, nightmares and difficulty with insomnia    4. Primary insomnia  Increased dose of mirtazapine    25 minutes spent with the patient total face-to-face with greater than 50% of the time being spent and counseling    The following are part of a depression follow up plan for the patient:  under care of mental health team    Devon Meza MD      PROGRESS NOTE   5/13/2019    SUBJECTIVE:  Lissa Lucero is a 69 y.o. female  who presents for   Chief Complaint   Patient presents with     Medication check     talk about PTSD and nightmares, and the MRTAZAPINE     1. Screen for colon cancer  Next month it will be 10 years since she had her colonoscopy and she like to do that later this fall.  She will need to have shoulder surgery this summer    2. Other specified hypothyroidism  The patient has been on levothyroxine 75 mg daily and is been on that for about 5 to 6 weeks now and is due for another TSH.    3. PTSD (post-traumatic stress disorder)  The patient has a history of PTSD as well as some anxiety and was given mirtazapine and particularly the PTSD seemed to be better controlled with the lower dose of mirtazapine rather than 15 mg but she is having some issues with nightmares on the 7.5 mg and is wondering if she could take a dose in between the 7.5 and 15 mg.  She does have a therapist at [facility] is been very helpful.  She is also found that music therapy as well as shemar chi has been therapeutic    4. Primary  insomnia  The mirtazapine has been used for her insomnia  Patient Active Problem List   Diagnosis     Osteopenia     Localized Primary Osteoarthritis Of The Carpometacarpal Joint Of The Right Thumb     Lower Back Pain     Hypercholesterolemia     Glossopharyngeal Neuralgia     Cataract     Benign Essential Hypertension     Psoriasis     Esophageal Reflux     Migraine Headache     Peripheral Neuropathy     Postmenopausal Atrophic Vaginitis     Anxiety disorder     Hypothyroid     Hyperglycemia     Foot pain, right     Chronic back pain     IBS (irritable bowel syndrome)     Abnormal stress test     Coronary artery disease involving native coronary artery of native heart with angina pectoris (H)     Arthritis of midfoot     Hypertension     Coronary artery disease     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       aspirin 81 MG EC tablet Take 81 mg by mouth daily.       cycloSPORINE (RESTASIS) 0.05 % ophthalmic emulsion Administer 1 drop to both eyes 2 (two) times a day.       doxycycline (MONODOX) 50 MG capsule Take 50 mg by mouth daily.   3     gabapentin (NEURONTIN) 600 MG tablet TAKE TWO TABLETS BY MOUTH THREE TIMES A  tablet 3     levothyroxine (SYNTHROID) 50 MCG tablet Take 1.5 tablets (75 mcg total) by mouth daily. 135 tablet 0     metoprolol tartrate (LOPRESSOR) 25 MG tablet TAKE ONE TABLET BY MOUTH EVERY DAY AT BEDTIME 90 tablet 2     mirtazapine (REMERON) 7.5 MG tablet Take 1.5 tablets (11.25 mg total) by mouth at bedtime. 135 tablet 3     multivit-mineral-iron-lutein (CENTRUM SILVER ULTRA WOMEN'S) Tab Take 1 tablet by mouth daily.        NIFEdipine (PROCARDIA XL) 30 MG 24 hr tablet TAKE ONE TABLET BY MOUTH EVERY DAY 90 tablet 3     ranitidine (ZANTAC) 150 MG tablet Take 150 mg by mouth every morning.       simvastatin (ZOCOR)  20 MG tablet TAKE ONE TABLET BY MOUTH EVERY DAY AT BEDTIME 90 tablet 3     cyclobenzaprine (FLEXERIL) 10 MG tablet TAKE ONE-HALF TO ONE TABLET BY MOUTH THREE TIMES A DAY AS NEEDED FOR MUSCLE SPASMS 30 tablet 1     ondansetron (ZOFRAN) 4 MG tablet Take 1 tablet (4 mg total) by mouth daily as needed for nausea. 30 tablet 1     No current facility-administered medications for this visit.        Social History     Tobacco Use   Smoking Status Never Smoker   Smokeless Tobacco Never Used   Tobacco Comment    No exposure           OBJECTIVE:        No results found for this or any previous visit (from the past 240 hour(s)).    Vitals:    05/13/19 0921   BP: 128/65   Pulse: (!) 53   SpO2: 98%   Weight: 150 lb 12.8 oz (68.4 kg)     Weight: 150 lb 12.8 oz (68.4 kg)          Physical Exam:  GENERAL APPEARANCE: Very pleasant 69-year-old female, NAD, well hydrated, well nourished  NEURO: no focal findings  The patient's PHQ 9 score is 6  Dima 7 score is 3

## 2021-05-28 NOTE — TELEPHONE ENCOUNTER
Patient in today asking to speak with physician or physician's nurse regarding latest lab results.

## 2021-05-29 ENCOUNTER — RECORDS - HEALTHEAST (OUTPATIENT)
Dept: ADMINISTRATIVE | Facility: CLINIC | Age: 71
End: 2021-05-29

## 2021-05-29 NOTE — TELEPHONE ENCOUNTER
Refill Approved    Rx renewed per Medication Renewal Policy. Medication was last renewed on 5/24/19 #90 .    Sadie Kendrick, [facility] Triage/Med Refill 5/25/2019     Requested Prescriptions   Pending Prescriptions Disp Refills     NIFEdipine (PROCARDIA XL) 30 MG 24 hr tablet [Pharmacy Med Name: NIFEDIPINE ER OSM REL 30 TB24] 90 tablet 3     Sig: TAKE ONE TABLET BY MOUTH EVERY DAY       Calcium-Channel Blockers Protocol Passed - 5/24/2019  7:50 AM        Passed - PCP or prescribing provider visit in past 12 months or next 3 months     Last office visit with prescriber/PCP: 11/1/2018 Linsey Gutiérrez MD OR same dept: 5/13/2019 Devon Meza MD OR same specialty: 5/13/2019 Devon Meza MD  Last physical: 5/8/2017 Last MTM visit: Visit date not found   Next visit within 3 mo: Visit date not found  Next physical within 3 mo: Visit date not found  Prescriber OR PCP: Linsey Gutiérrez MD  Last diagnosis associated with med order: 1. Coronary artery disease involving native coronary artery of native heart with angina pectoris (H)  - NIFEdipine (PROCARDIA XL) 30 MG 24 hr tablet [Pharmacy Med Name: NIFEDIPINE ER OSM REL 30 TB24]; TAKE ONE TABLET BY MOUTH EVERY DAY  Dispense: 90 tablet; Refill: 3    If protocol passes may refill for 12 months if within 3 months of last provider visit (or a total of 15 months).             Passed - Blood pressure filed in past 12 months     BP Readings from Last 1 Encounters:   05/13/19 128/65

## 2021-05-29 NOTE — TELEPHONE ENCOUNTER
Rx request responded to by other means.    Maged Turcios, RN BSN, [facility] Triage/Med Refill

## 2021-05-30 ENCOUNTER — RECORDS - HEALTHEAST (OUTPATIENT)
Dept: ADMINISTRATIVE | Facility: CLINIC | Age: 71
End: 2021-05-30

## 2021-05-30 VITALS — HEIGHT: 64 IN | WEIGHT: 129 LBS | BODY MASS INDEX: 22.02 KG/M2

## 2021-05-30 VITALS — WEIGHT: 128 LBS | HEIGHT: 64 IN | BODY MASS INDEX: 21.85 KG/M2

## 2021-05-30 VITALS — WEIGHT: 125 LBS | HEIGHT: 64 IN | BODY MASS INDEX: 21.34 KG/M2

## 2021-05-30 VITALS — WEIGHT: 141 LBS | BODY MASS INDEX: 24.07 KG/M2 | HEIGHT: 64 IN

## 2021-05-30 VITALS — HEIGHT: 64 IN | BODY MASS INDEX: 24.07 KG/M2 | WEIGHT: 141 LBS

## 2021-05-30 VITALS — HEIGHT: 64 IN | WEIGHT: 146 LBS | BODY MASS INDEX: 24.92 KG/M2

## 2021-05-30 VITALS — WEIGHT: 135 LBS | HEIGHT: 64 IN | BODY MASS INDEX: 23.05 KG/M2

## 2021-05-30 NOTE — TELEPHONE ENCOUNTER
Refill Approved    Rx renewed per Medication Renewal Policy. Medication was last renewed on 4/2/19.    Mariah Hopkins, [facility] Triage/Med Refill 7/23/2019     Requested Prescriptions   Pending Prescriptions Disp Refills     levothyroxine (SYNTHROID, LEVOTHROID) 50 MCG tablet [Pharmacy Med Name: LEVOTHYROXINE SODIUM 50MCG TABS] 135 tablet 0     Sig: TAKE ONE AND ONE-HALF TABLETS BY MOUTH EVERY DAY       Thyroid Hormones Protocol Passed - 7/22/2019  2:00 PM        Passed - Provider visit in past 12 months or next 3 months     Last office visit with prescriber/PCP: 3/27/2019 Ravi Brannon CNP OR same dept: 5/13/2019 Devon Meza MD OR same specialty: 5/13/2019 Devon Meza MD  Last physical: Visit date not found Last MTM visit: Visit date not found   Next visit within 3 mo: Visit date not found  Next physical within 3 mo: Visit date not found  Prescriber OR PCP: Ravi Brannon CNP  Last diagnosis associated with med order: 1. Hypothyroidism, unspecified type  - levothyroxine (SYNTHROID, LEVOTHROID) 50 MCG tablet [Pharmacy Med Name: LEVOTHYROXINE SODIUM 50MCG TABS]; TAKE ONE AND ONE-HALF TABLETS BY MOUTH EVERY DAY  Dispense: 135 tablet; Refill: 0    If protocol passes may refill for 12 months if within 3 months of last provider visit (or a total of 15 months).             Passed - TSH on file in past 12 months for patient age 12 & older     TSH   Date Value Ref Range Status   05/13/2019 2.05 0.30 - 5.00 uIU/mL Final

## 2021-05-30 NOTE — TELEPHONE ENCOUNTER
5/13/19 [place] colonoscopy referral cancelled on 6/28/19 per [facility] referral site. multiple calls & letter sent. No response from pt. Next colon screening contact dec 2019.

## 2021-05-31 ENCOUNTER — RECORDS - HEALTHEAST (OUTPATIENT)
Dept: ADMINISTRATIVE | Facility: CLINIC | Age: 71
End: 2021-05-31

## 2021-05-31 VITALS — HEIGHT: 64 IN | BODY MASS INDEX: 24.92 KG/M2 | WEIGHT: 146 LBS

## 2021-05-31 VITALS — BODY MASS INDEX: 25.1 KG/M2 | WEIGHT: 147 LBS | HEIGHT: 64 IN

## 2021-05-31 VITALS — WEIGHT: 146 LBS | BODY MASS INDEX: 24.92 KG/M2 | HEIGHT: 64 IN

## 2021-05-31 VITALS — HEIGHT: 64 IN | BODY MASS INDEX: 25.27 KG/M2 | WEIGHT: 148 LBS

## 2021-05-31 VITALS — WEIGHT: 149 LBS | HEIGHT: 64 IN | BODY MASS INDEX: 25.44 KG/M2

## 2021-05-31 NOTE — TELEPHONE ENCOUNTER
Refill Approved    Rx renewed per Medication Renewal Policy. Medication was last renewed on 5/24/19.    Sadie Kendrick, [facility] Connection Triage/Med Refill 8/25/2019     Requested Prescriptions   Pending Prescriptions Disp Refills     NIFEdipine (PROCARDIA XL) 30 MG 24 hr tablet [Pharmacy Med Name: NIFEDIPINE ER OSM REL 30 TB24] 90 tablet 0     Sig: TAKE 1 TABLET BY MOUTH ONCE DAILY       Calcium-Channel Blockers Protocol Passed - 8/25/2019  8:16 AM        Passed - PCP or prescribing provider visit in past 12 months or next 3 months     Last office visit with prescriber/PCP: 3/27/2019 Ravi Brannon CNP OR same dept: 5/13/2019 Devon Meza MD OR same specialty: 5/13/2019 Devon Meza MD  Last physical: Visit date not found Last MTM visit: Visit date not found   Next visit within 3 mo: Visit date not found  Next physical within 3 mo: Visit date not found  Prescriber OR PCP: Ravi Brannon CNP  Last diagnosis associated with med order: 1. Coronary artery disease involving native coronary artery of native heart with angina pectoris (H)  - NIFEdipine (PROCARDIA XL) 30 MG 24 hr tablet [Pharmacy Med Name: NIFEDIPINE ER OSM REL 30 TB24]; TAKE 1 TABLET BY MOUTH ONCE DAILY  Dispense: 90 tablet; Refill: 0    If protocol passes may refill for 12 months if within 3 months of last provider visit (or a total of 15 months).             Passed - Blood pressure filed in past 12 months     BP Readings from Last 1 Encounters:   05/13/19 128/65

## 2021-06-01 ENCOUNTER — RECORDS - HEALTHEAST (OUTPATIENT)
Dept: ADMINISTRATIVE | Facility: CLINIC | Age: 71
End: 2021-06-01

## 2021-06-01 VITALS — WEIGHT: 145 LBS | HEIGHT: 64 IN | BODY MASS INDEX: 24.75 KG/M2

## 2021-06-01 VITALS — BODY MASS INDEX: 24.79 KG/M2 | HEIGHT: 64 IN | WEIGHT: 145.2 LBS

## 2021-06-01 VITALS — HEIGHT: 64 IN | BODY MASS INDEX: 25.44 KG/M2 | WEIGHT: 149 LBS

## 2021-06-01 NOTE — TELEPHONE ENCOUNTER
Refill Approved    Rx renewed per Medication Renewal Policy. Medication was last renewed on 12/11/18.    Linsey Holland, [facility] Connection Triage/Med Refill 9/9/2019     Requested Prescriptions   Pending Prescriptions Disp Refills     metoprolol tartrate (LOPRESSOR) 25 MG tablet [Pharmacy Med Name: METOPROLOL TARTRATE 25MG TABS] 90 tablet 2     Sig: TAKE ONE TABLET BY MOUTH AT BEDTIME       Beta-Blockers Refill Protocol Passed - 9/9/2019  8:35 AM        Passed - PCP or prescribing provider visit in past 12 months or next 3 months     Last office visit with prescriber/PCP: 11/1/2018 Linsey Gutiérrez MD OR same dept: 5/13/2019 Devon Meza MD OR same specialty: 5/13/2019 Devon Meza MD  Last physical: 5/8/2017 Last MTM visit: Visit date not found   Next visit within 3 mo: Visit date not found  Next physical within 3 mo: Visit date not found  Prescriber OR PCP: Linsey Gutiérrez MD  Last diagnosis associated with med order: 1. Hypertension  - metoprolol tartrate (LOPRESSOR) 25 MG tablet [Pharmacy Med Name: METOPROLOL TARTRATE 25MG TABS]; TAKE ONE TABLET BY MOUTH AT BEDTIME  Dispense: 90 tablet; Refill: 2    If protocol passes may refill for 12 months if within 3 months of last provider visit (or a total of 15 months).             Passed - Blood pressure filed in past 12 months     BP Readings from Last 1 Encounters:   05/13/19 128/65

## 2021-06-02 VITALS — HEIGHT: 64 IN | WEIGHT: 151 LBS | BODY MASS INDEX: 25.78 KG/M2

## 2021-06-02 VITALS — WEIGHT: 151.2 LBS | BODY MASS INDEX: 25.95 KG/M2

## 2021-06-02 VITALS — WEIGHT: 153 LBS | HEIGHT: 64 IN | BODY MASS INDEX: 26.12 KG/M2

## 2021-06-02 NOTE — PROGRESS NOTES
PROGRESS NOTE       SUBJECTIVE:  Lissa Lucero is a 69 y.o. female   Chief Complaint   Patient presents with     check thyroid levels     Flu Vaccine     Follow-up     [place] , went to [facility] on Saturday dx with a concusion    Patient is here for follow-up of her recent head injury.  And also just to update me in general on things.  Overall she feels as though she has been doing quite well.  She has been doing healing touch with Lanny anderson and finds it very helpful.  She plans to redo her bathroom so that it is more safe as she ages.    Patient was getting on the train to go to [place] on Thursday afternoon.  It abruptly moved and she had her head on the pole and then fell.  Others assisted her.  She was seen in the hospital because she became nauseated and had a headache.  It struck the left side of her head.  The nausea got better but the pain in her head was originally on the left side not she feels something on the right side but overall she is feeling much better.  She thinks she just did too much yesterday.    When she was in the emergency room a CT scan was done and the results are reviewed with her today.  Essentially they are negative.    Patient requests a flu vaccine.    Dr. Jakob Griggs at [facility] plans a procedure to remove the hardware in her foot and possibly do a fusion and/or may be bone graft.  The exact procedure has not been fully decided.      Patient Active Problem List   Diagnosis     Osteopenia     Localized Primary Osteoarthritis Of The Carpometacarpal Joint Of The Right Thumb     Lower Back Pain     Hypercholesterolemia     Glossopharyngeal Neuralgia     Cataract     Benign Essential Hypertension     Psoriasis     Esophageal Reflux     Migraine Headache     Peripheral Neuropathy     Postmenopausal Atrophic Vaginitis     Anxiety disorder     Hypothyroid     Hyperglycemia     Foot pain, right     Chronic back pain     IBS (irritable bowel syndrome)      Abnormal stress test     Coronary artery disease involving native coronary artery of native heart with angina pectoris (H)     Arthritis of midfoot     Hypertension     Coronary artery disease     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       aspirin 81 MG EC tablet Take 81 mg by mouth daily.       doxycycline (MONODOX) 50 MG capsule Take 50 mg by mouth daily.   3     gabapentin (NEURONTIN) 600 MG tablet TAKE TWO TABLETS BY MOUTH THREE TIMES A  tablet 3     levothyroxine (SYNTHROID, LEVOTHROID) 50 MCG tablet TAKE ONE AND ONE-HALF TABLETS BY MOUTH EVERY  tablet 2     metoprolol tartrate (LOPRESSOR) 25 MG tablet Take 1 tablet (25 mg total) by mouth at bedtime. 90 tablet 2     multivit-mineral-iron-lutein (CENTRUM SILVER ULTRA WOMEN'S) Tab Take 1 tablet by mouth daily.        NIFEdipine (PROCARDIA XL) 30 MG 24 hr tablet Take 1 tablet (30 mg total) by mouth daily. 90 tablet 0     simvastatin (ZOCOR) 20 MG tablet TAKE ONE TABLET BY MOUTH EVERY DAY AT BEDTIME 90 tablet 3     No current facility-administered medications for this visit.        Social History     Tobacco Use   Smoking Status Never Smoker   Smokeless Tobacco Never Used   Tobacco Comment    No exposure       REVIEW OF SYSTEMS:  Patient denies fever, chills, dizziness, headache, visual change, ear pain, cough, chest pain, shortness of breath, abdominal pain, extremity pain or swelling, rash,  depression or anxiety.          OBJECTIVE:       Vitals:    10/23/19 1352   BP: 138/84   Pulse: (!) 56   SpO2: 98%     Weight: 151 lb (68.5 kg)    Wt Readings from Last 3 Encounters:   10/23/19 151 lb (68.5 kg)   05/13/19 150 lb 12.8 oz (68.4 kg)   03/27/19 151 lb (68.5 kg)     Body mass index is 25.92 kg/m .        Physical Exam:  GENERAL APPEARANCE: A&A, NAD, well hydrated, well  nourished  SKIN:  Normal skin turgor, no lesions/rashes   EARS: TM's normal, gray with nl light reflex  OROPHARYNX: without erythema, no post nasal drainage or thrush  NECK: Supple, without lymphadenopathy, no thyroid mass  CV: RRR, no M/G/R   LUNGS: CTAB, normal respiratory effort  EXTREMITY: Extremities normal, atraumatic, no swelling  NEURO: no gross deficits   PSYCHIATRIC:  Mood appropriate, memory intact        ASSESSMENT/PLAN:     1. Flu vaccine need  Flu vaccine is administered  - Influenza High Dose,Seasonal,PF 65+ Yrs    2. Acquired hypothyroidism  We will check TSH  - Thyroid Stimulating Hormone (TSH)    3. Injury of head, initial encounter  I recommended that she keep her activity level underneath any head discomfort.  She should be headache/pain-free within 7 to 10 days.  If not she will let me know or if any new symptoms develop.      There are no Patient Instructions on file for this visit.  Medications Discontinued During This Encounter   Medication Reason     ranitidine (ZANTAC) 150 MG tablet Therapy completed     cyclobenzaprine (FLEXERIL) 10 MG tablet Therapy completed     mirtazapine (REMERON) 7.5 MG tablet Therapy completed     ondansetron (ZOFRAN) 4 MG tablet Therapy completed     cycloSPORINE (RESTASIS) 0.05 % ophthalmic emulsion Therapy completed     No follow-ups on file.    I spent a total of 25 minutes face to face with the patient.  Over 50% of the time spent counseling and educating the patient about all of the above.      Linsey Gutiérrez MD

## 2021-06-03 VITALS
OXYGEN SATURATION: 98 % | SYSTOLIC BLOOD PRESSURE: 138 MMHG | HEIGHT: 64 IN | BODY MASS INDEX: 25.78 KG/M2 | DIASTOLIC BLOOD PRESSURE: 84 MMHG | HEART RATE: 56 BPM | WEIGHT: 151 LBS

## 2021-06-03 VITALS — WEIGHT: 150.8 LBS | BODY MASS INDEX: 25.88 KG/M2

## 2021-06-03 NOTE — TELEPHONE ENCOUNTER
Refill Approved    Rx renewed per Medication Renewal Policy. Medication was last renewed on 10/12/18.    Mariah Hopkins, [facility] Triage/Med Refill 11/19/2019     Requested Prescriptions   Pending Prescriptions Disp Refills     gabapentin (NEURONTIN) 600 MG tablet [Pharmacy Med Name: GABAPENTIN 600MG TABS] 540 tablet 3     Sig: TAKE TWO TABLETS BY MOUTH THREE TIMES A DAY       Gabapentin/Levetiracetam/Tiagabine Refill Protocol  Passed - 11/18/2019 11:10 AM        Passed - PCP or prescribing provider visit in past 12 months or next 3 months     Last office visit with prescriber/PCP: 10/23/2019 Linsey Gutiérrez MD OR same dept: 10/23/2019 Linsey Gutiérrez MD OR same specialty: 10/23/2019 Linsey Gutiérrez MD  Last physical: 5/8/2017 Last MTM visit: Visit date not found   Next visit within 3 mo: Visit date not found  Next physical within 3 mo: Visit date not found  Prescriber OR PCP: Linsey Gutiérrez MD  Last diagnosis associated with med order: There are no diagnoses linked to this encounter.  If protocol passes may refill for 12 months if within 3 months of last provider visit (or a total of 15 months).

## 2021-06-03 NOTE — TELEPHONE ENCOUNTER
Refill Approved    Rx renewed per Medication Renewal Policy. Medication was last renewed on 8/25/19.    Miesha Cristina, [facility] Connection Triage/Med Refill 11/22/2019     Requested Prescriptions   Pending Prescriptions Disp Refills     NIFEdipine (PROCARDIA XL) 30 MG 24 hr tablet [Pharmacy Med Name: NIFEDIPINE ER OSM REL 30 TB24] 90 tablet 0     Sig: TAKE ONE TABLET BY MOUTH EVERY DAY       Calcium-Channel Blockers Protocol Passed - 11/21/2019  8:28 AM        Passed - PCP or prescribing provider visit in past 12 months or next 3 months     Last office visit with prescriber/PCP: 3/27/2019 Ravi Brannon CNP OR same dept: 10/23/2019 Linsey Gutiérrez MD OR same specialty: 10/23/2019 Linsey Gutiérrez MD  Last physical: Visit date not found Last MTM visit: Visit date not found   Next visit within 3 mo: Visit date not found  Next physical within 3 mo: Visit date not found  Prescriber OR PCP: Ravi Brannon CNP  Last diagnosis associated with med order: 1. Coronary artery disease involving native coronary artery of native heart with angina pectoris (H)  - NIFEdipine (PROCARDIA XL) 30 MG 24 hr tablet [Pharmacy Med Name: NIFEDIPINE ER OSM REL 30 TB24]; TAKE ONE TABLET BY MOUTH EVERY DAY  Dispense: 90 tablet; Refill: 0    If protocol passes may refill for 12 months if within 3 months of last provider visit (or a total of 15 months).             Passed - Blood pressure filed in past 12 months     BP Readings from Last 1 Encounters:   10/23/19 138/84

## 2021-06-04 VITALS
HEART RATE: 60 BPM | SYSTOLIC BLOOD PRESSURE: 151 MMHG | TEMPERATURE: 98.3 F | RESPIRATION RATE: 17 BRPM | DIASTOLIC BLOOD PRESSURE: 82 MMHG | OXYGEN SATURATION: 98 %

## 2021-06-04 VITALS
WEIGHT: 132 LBS | DIASTOLIC BLOOD PRESSURE: 62 MMHG | HEART RATE: 54 BPM | OXYGEN SATURATION: 98 % | SYSTOLIC BLOOD PRESSURE: 101 MMHG | BODY MASS INDEX: 22.66 KG/M2

## 2021-06-04 VITALS
HEART RATE: 52 BPM | DIASTOLIC BLOOD PRESSURE: 58 MMHG | WEIGHT: 134.38 LBS | OXYGEN SATURATION: 98 % | BODY MASS INDEX: 23.07 KG/M2 | SYSTOLIC BLOOD PRESSURE: 118 MMHG

## 2021-06-04 VITALS
RESPIRATION RATE: 20 BRPM | SYSTOLIC BLOOD PRESSURE: 139 MMHG | WEIGHT: 132 LBS | DIASTOLIC BLOOD PRESSURE: 74 MMHG | HEART RATE: 59 BPM | OXYGEN SATURATION: 99 % | TEMPERATURE: 98.1 F | BODY MASS INDEX: 22.66 KG/M2

## 2021-06-04 VITALS
SYSTOLIC BLOOD PRESSURE: 114 MMHG | HEART RATE: 58 BPM | WEIGHT: 135 LBS | DIASTOLIC BLOOD PRESSURE: 62 MMHG | BODY MASS INDEX: 23.17 KG/M2 | OXYGEN SATURATION: 97 %

## 2021-06-04 VITALS
DIASTOLIC BLOOD PRESSURE: 80 MMHG | SYSTOLIC BLOOD PRESSURE: 120 MMHG | BODY MASS INDEX: 25.78 KG/M2 | HEIGHT: 64 IN | HEART RATE: 64 BPM | WEIGHT: 151 LBS

## 2021-06-04 VITALS
SYSTOLIC BLOOD PRESSURE: 118 MMHG | HEART RATE: 55 BPM | BODY MASS INDEX: 23.41 KG/M2 | DIASTOLIC BLOOD PRESSURE: 66 MMHG | RESPIRATION RATE: 18 BRPM | OXYGEN SATURATION: 98 % | HEIGHT: 64 IN | WEIGHT: 137.13 LBS

## 2021-06-05 VITALS
DIASTOLIC BLOOD PRESSURE: 80 MMHG | HEART RATE: 70 BPM | WEIGHT: 130.25 LBS | RESPIRATION RATE: 16 BRPM | SYSTOLIC BLOOD PRESSURE: 120 MMHG | OXYGEN SATURATION: 99 % | BODY MASS INDEX: 22.36 KG/M2

## 2021-06-06 NOTE — TELEPHONE ENCOUNTER
Refill Approved    Rx renewed per Medication Renewal Policy. Medication was last renewed on 9/9/19.    Mariah Hopkins, [facility] Triage/Med Refill 3/12/2020     Requested Prescriptions   Pending Prescriptions Disp Refills     metoprolol tartrate (LOPRESSOR) 25 MG tablet [Pharmacy Med Name: METOPROLOL TARTRATE 25MG TABS] 90 tablet 2     Sig: TAKE ONE TABLET BY MOUTH AT BEDTIME       Beta-Blockers Refill Protocol Passed - 3/9/2020  9:37 AM        Passed - PCP or prescribing provider visit in past 12 months or next 3 months     Last office visit with prescriber/PCP: 2/24/2020 Linsey Gutiérrez MD OR same dept: 2/24/2020 Linsey Gutéirrez MD OR same specialty: 2/24/2020 Linsey Gutiérrez MD  Last physical: 5/8/2017 Last MTM visit: Visit date not found   Next visit within 3 mo: Visit date not found  Next physical within 3 mo: Visit date not found  Prescriber OR PCP: Linsey Gutiérrez MD  Last diagnosis associated with med order: 1. Hypertension  - metoprolol tartrate (LOPRESSOR) 25 MG tablet [Pharmacy Med Name: METOPROLOL TARTRATE 25MG TABS]; TAKE ONE TABLET BY MOUTH AT BEDTIME  Dispense: 90 tablet; Refill: 2    If protocol passes may refill for 12 months if within 3 months of last provider visit (or a total of 15 months).             Passed - Blood pressure filed in past 12 months     BP Readings from Last 1 Encounters:   02/24/20 120/80

## 2021-06-06 NOTE — PROGRESS NOTES
PROGRESS NOTE       SUBJECTIVE:  Lissa Lucero is a 70 y.o. female   Chief Complaint   Patient presents with     Ear Injury     pt ear lobes piervinita Conn is here because her earrings are falling out of her piercing.  The problem is the piercing has become stretched and is no longer functional.  She thought this would be an easy fix.  Otherwise she has been well.      Patient Active Problem List   Diagnosis     Osteopenia     Localized Primary Osteoarthritis Of The Carpometacarpal Joint Of The Right Thumb     Lower Back Pain     Hypercholesterolemia     Glossopharyngeal Neuralgia     Cataract     Benign Essential Hypertension     Psoriasis     Esophageal Reflux     Migraine Headache     Peripheral Neuropathy     Postmenopausal Atrophic Vaginitis     Anxiety disorder     Hypothyroid     Hyperglycemia     Foot pain, right     Chronic back pain     IBS (irritable bowel syndrome)     Abnormal stress test     Coronary artery disease involving native coronary artery of native heart with angina pectoris (H)     Arthritis of midfoot     Hypertension     Coronary artery disease     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       doxycycline (VIBRAMYCIN) 100 MG capsule        gabapentin (NEURONTIN) 600 MG tablet TAKE TWO TABLETS BY MOUTH THREE TIMES A  tablet 3     levothyroxine (SYNTHROID, LEVOTHROID) 50 MCG tablet TAKE ONE AND ONE-HALF TABLETS BY MOUTH EVERY  tablet 2     metoprolol tartrate (LOPRESSOR) 25 MG tablet Take 1 tablet (25 mg total) by mouth at bedtime. 90 tablet 2     multivit-mineral-iron-lutein (CENTRUM SILVER ULTRA WOMEN'S) Tab Take 1 tablet by mouth daily.        NIFEdipine (PROCARDIA XL) 30 MG 24 hr tablet TAKE ONE TABLET BY MOUTH EVERY DAY 90 tablet 3     simvastatin (ZOCOR) 20 MG tablet TAKE ONE TABLET BY  MOUTH EVERY DAY AT BEDTIME 90 tablet 3     doxycycline (MONODOX) 50 MG capsule Take 50 mg by mouth daily.   3     RESTASIS 0.05 % ophthalmic emulsion        No current facility-administered medications for this visit.        Social History     Tobacco Use   Smoking Status Never Smoker   Smokeless Tobacco Never Used   Tobacco Comment    No exposure       REVIEW OF SYSTEMS:   Patient denies fever, chills, dizziness, headache, visual change, ear pain, cough, chest pain, shortness of breath, abdominal pain, extremity pain or swelling, rash,  depression or anxiety.          OBJECTIVE:       Vitals:    02/24/20 1052   BP: 120/80   Pulse: 64     Weight: 151 lb (68.5 kg)    Wt Readings from Last 3 Encounters:   02/24/20 151 lb (68.5 kg)   10/23/19 151 lb (68.5 kg)   05/13/19 150 lb 12.8 oz (68.4 kg)     Body mass index is 25.92 kg/m .        Physical Exam:  GENERAL APPEARANCE: A&A, NAD, well hydrated, well nourished  SKIN: Her earlobes have been double pierced.  She only uses the main piercing on each side as the other holes have closed.  Her holes are enlarged and the one on the left lobe measures 3 mm without stretching.  It is obviously no longer functional.  EARS: TM's normal, gray with nl light reflex  OROPHARYNX: without erythema, no post nasal drainage or thrush  NECK: Supple, without lymphadenopathy, no thyroid mass  CV: RRR, no M/G/R   LUNGS: CTAB, normal respiratory effort   PSYCHIATRIC:  Mood appropriate, memory intact        ASSESSMENT/PLAN:     1. Tear of earlobe, initial encounter  This is not really a tear but stretching which makes the piercing nonfunctional.  Her choices are to repeat years another location in the lobe or have a cosmetic procedure to close the earlobe and start over.  She is not anxious to do any of this and will think it over.  I recommend she see a cosmetic ENT physician if she wants further assessment.      There are no Patient Instructions on file for this visit.  Medications  Discontinued During This Encounter   Medication Reason     aspirin 81 MG EC tablet Therapy completed     No follow-ups on file.    I spent a total of 20 minutes face to face with the patient.  Over 50% of the time spent counseling and educating the patient about all of the above.      Linsey Gutiérrez MD

## 2021-06-07 NOTE — PROGRESS NOTES
"Lissa Lucero is a 70 y.o. female who is being evaluated via a billable video visit.      The patient has been notified of following:     \"This video visit will be conducted via a call between you and your physician/provider. We have found that certain health care needs can be provided without the need for an in-person physical exam.  This service lets us provide the care you need with a video conversation.  If a prescription is necessary we can send it directly to your pharmacy.  If lab work is needed we can place an order for that and you can then stop by our lab to have the test done at a later time.    Video visits are billed at different rates depending on your insurance coverage. Please reach out to your insurance provider with any questions.    If during the course of the call the physician/provider feels a video visit is not appropriate, you will not be charged for this service.\"    Patient has given verbal consent to a Video visit? Yes    Patient would like to receive their AVS by AVS Preference: Mail a copy.    Patient would like the video invitation sent by: Text to cell phone: [phone]    Will anyone else be joining your video visit? No        Video Start Time: 9:00    Additional provider notes:     ASSESSMENT/PLAN:       1. Special screening for malignant neoplasms, colon     - Cologuard ordered and agreeable to proceeding with this.  The patient will need to be called with the results of her test.    2. Hypothyroidism, unspecified type  The patient will continue on levothyroxine 75 mcg daily    3. Coronary artery disease involving native coronary artery of native heart with angina pectoris (H)  The patient will continue on her beta-blocker and it seems that her angina is stable    4. Benign Essential Hypertension  The patient will continue on metoprolol as well as nifedipine.    5. Seasonal affective disorder (H)  The patient has stopped using her light for seasonal affective disorder but will " continue that again starting in the fall and going through the winter months.      6. Hypercholesterolemia  The patient will continue on simvastatin 20 mg daily    7. Peripheral Neuropathy  The patient seems to be managing okay with her peripheral neuropathy symptoms    8. Osteopenia  The patient does take a supplement that has some calcium and vitamin D in it.    Of note is that the patient has no longer had migraine headaches    The patient should have another bone density study done in November 2020    In November when I see the patient back we will need to repeat her lipids.    Of note is that the patient stop mirtazapine in August 2019 as it seemed to make her symptoms of peripheral neuropathy and even her insomnia worse.    Return to clinic in 6 months for annual wellness visit      The following are part of a depression follow up plan for the patient:  coping support assessment and mental health care management    Devon Meza MD      PROGRESS NOTE   5/4/2020    SUBJECTIVE:  Lissa Lucero is a 70 y.o. female  who presents for   Chief Complaint   Patient presents with     Follow-up     Medication Check, struggling a bit with Isolation     1. Special screening for malignant neoplasms, colon  The patient agrees to proceed with Cologuard. No personal history of colon polyps and no family history of colon cancer    2. Hypothyroidism, unspecified type  The patient had blood work done last week to check her thyroid function.  The patient is currently taking levothyroxine 75 mg daily.    3. 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  The patient takes metoprolol 25 mg nightly    4. Benign Essential Hypertension  The patient earlier this year he had her blood pressure checked and it was 120/80.  The patient takes nifedipine XL 30 mg daily.    5. Seasonal affective disorder (H)  From October 1 through  April 30 the patient uses a 10,000 Lux light to assist with her seasonal affective disorder which works quite nicely.    6. Hypercholesterolemia   The patient is on simvastatin 20 mg nightly    7. Peripheral Neuropathy  The patient has tapered off of her gabapentin 1200 mg 3 times a day and has not noticed that her peripheral neuropathy symptoms are any worse.    8. Osteopenia  The patient's last bone density study was done November 2018 and her FRAX score was for major osteoporotic fracture 12.2% and for hip fracture 2.6%.      Patient Active Problem List   Diagnosis     Osteopenia     Localized Primary Osteoarthritis Of The Carpometacarpal Joint Of The Right Thumb     Lower Back Pain     Hypercholesterolemia     Glossopharyngeal Neuralgia     Cataract     Benign Essential Hypertension     Psoriasis     Esophageal Reflux     Migraine Headache     Peripheral Neuropathy     Postmenopausal Atrophic Vaginitis     Anxiety disorder     Hypothyroid     Hyperglycemia     Foot pain, right     Chronic back pain     IBS (irritable bowel syndrome)     Abnormal stress test     Coronary artery disease involving native coronary artery of native heart with angina pectoris (H)     Arthritis of midfoot     Hypertension     Coronary artery disease     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       levothyroxine (SYNTHROID, LEVOTHROID) 50 MCG tablet TAKE ONE AND ONE-HALF TABLETS BY MOUTH EVERY  tablet 1     metoprolol tartrate (LOPRESSOR) 25 MG tablet TAKE ONE TABLET BY MOUTH AT BEDTIME 90 tablet 3     multivit-mineral-iron-lutein (CENTRUM SILVER ULTRA WOMEN'S) Tab Take 1 tablet by mouth daily.        NIFEdipine (PROCARDIA XL) 30 MG 24 hr tablet TAKE ONE TABLET BY MOUTH EVERY DAY 90 tablet 3     RESTASIS 0.05 % ophthalmic emulsion        simvastatin  "(ZOCOR) 20 MG tablet TAKE ONE TABLET BY MOUTH AT BEDTIME 90 tablet 1     No current facility-administered medications for this visit.        Social History     Tobacco Use   Smoking Status Never Smoker   Smokeless Tobacco Never Used   Tobacco Comment    No exposure           OBJECTIVE:        Recent Results (from the past 240 hour(s))   T4, Free   Result Value Ref Range    Free T4 1.0 0.7 - 1.8 ng/dL   Thyroid Stimulating Hormone (TSH)   Result Value Ref Range    TSH 1.19 0.30 - 5.00 uIU/mL       There were no vitals filed for this visit.  No data recorded        Physical Exam:  \"GENERAL: healthy, alert and no distress\",\"EYES: Eyes grossly normal to inspection, conjunctivae and sclerae normal\",\"RESP: no audible wheeze, cough, or visible cyanosis.  No visible retractions or increased work of breathing.  Able to speak fully in complete sentences.\",\"NEURO: Cranial nerves grossly intact, mentation intact and speech normal\",\"PSYCH: mentation appears normal, affect normal/bright, judgement and insight intact, normal speech and appearance well-groomed\"}      Video-Visit Details    Type of service:  Video Visit    Video End Time (time video stopped): 9:16  Total time 16 minutes  Originating Location (pt. Location): Home    Distant Location (provider location):  [facility]     Platform used for Video Visit: Using [employer] bruce      Devon Meza MD      "

## 2021-06-07 NOTE — TELEPHONE ENCOUNTER
Fax received from [facility] requesting a 90 day refill for Simvastatin 20 mg tabs and Levothyroxine 50 mcg tabs.    Simvastatin last filled by Dr. Meza on 5/9/19 for 90 tabs and 3 refills.     Levothyroxine last filled on 7/23/19 for 135 tabs and 2 refills.     Called and spoke with the patient, she only has 4 days left of the Simvastatin and 5 days left of Levothyroxine.     Patient last had thyroid labs drawn on 10/23/19. I recommended patient to schedule a lab only apt for thyroid labs to be done this week to ensure dosing is correct.    Patient's last med check apt with Dr. Meza on 5/13/19. Overdue for an apt, scheduled her for a virtual visit with Dr. Meza on 5/4/20.      Dr. Tan, can you please place orders for thyroid labs to be done today. Apt at 2:00 PM. Can you also look into the refills?

## 2021-06-08 NOTE — PROGRESS NOTES
ASSESSMENT/PLAN:     1. Glossopharyngeal neuralgia  Patient told me that the Dilaudid she was treated with at the hospital helped.  She did not indicate that she has 30 tablets at home which was provided on discharge.  I'm recommending that she stay off narcotic medication altogether because it has caused significant GI problems not mentioned problems with oversedation.  Her daughter agrees.  She is currently completing her application to be seen at [facility].  I support her in doing this and also suggested alternative pain relief measures such as acupuncture, massage, meditation, etc.    2. Essential hypertension  Controlled.  Patient would like to stop the isosorbide and I think this will be fine.    3. Gastroesophageal reflux disease, esophagitis presence not specified  She last had an endoscopy one year ago which was normal.  She currently is taking Carafate and this is helping symptoms.  As long as she does well with this repeat endoscopy is not necessary.    4. Hypercholesterolemia      5. Abnormal stress test  Angiogram shows mild coronary artery disease with a 30% lesion in the right coronary artery.  1/5/2017.    I recommended that patient stop the Keppra since it is causing sedation.  Unless she wants to work with Dr. Cash further with this medication.  Patient states she thinks she will stop it.  I have reconciled all medications.     CHIEF COMPLAINT  Chief Complaint   Patient presents with     Follow-up     [facility] 12/27/17,  [facility] 1/5/17        HPI:  Lissa Lucero is a 66 y.o. female presenting to the clinic today for a follow up regarding an emergency room visit. She is accompanied by her daughter-in-law, Malena. On 12/27/16 she went to the [facility] emergency room after experiencing epigastric pain, substernal chest pain, bilateral arm pain and heaviness for a week. At that time, she had nausea without vomiting, and decreased appetite. She described her pain as burning and 9 out of 10 on  the pain scale and constant pain. Her initial cardiac enzyme and EKG were within normal limits, and blood pressure was significantly elevated. Because of multiple cardiac risk factors, she was admitted to the hospital to rule out acute coronary syndrome. In the hospital she had an abnormal stress test and proceeded to have a chest CT angiogram following. She visited with a GI doctor while in the hospital and she thought she was having bad acid reflux. She had a scope done last year and showed no signs of this. She did not have a repeat scope done. Pending her CT and resolving chest pains, she was discharged and left the hospital at 3 pm on 12/29/16. She was then called at 4:15 pm on the same day and told that she had an area of stenosis in the mid RCA and that she should return to the ER if she continued to experience pain. She proceeded to the emergency room that evening, but could not be seen until around midnight that night. The emergency room thought she may have and ulcerated esophagus from terrible GERD. Her CT has returned normal. She notes that she continues to feel pain, and has noticed her voice changing, stomach aches, and pain with eating anything. She awoke this morning with firey pain in her stomach around 3 am. Because of this, she had to get out of bed, and was not able to fall back to sleep. She is feeling exhausted and has propped up her bed in hopes to relieve symptoms. She is currently not using anything to control her pain, although she continues to have it. She was taking oxycodone before her hospital visit, but says it did not help. She was given Dilaudid in the hospital and reports that this helped her, but made her drowsy, and interfered with activity. She received medical marijuana through [facility], but said she was unable to inhale the smoke due to her esophageal pain. She does not want to continue taking narcotics and is wondering what steps she should take next.     Note from  attending physician at :  This is a message for [facility] attention Dr. Linsey Gutiérrez. Patient Lissa Lucero. MRN # [id number].  1950. Ms. Lucero came to the hospital with substernal chest pain and bilateral hand pain burning sensation. She had stress test which was minimally abnormal. Had chest CT angiogram with results pending at time of discharge. Chest pain has almost resolved at time of discharge. very atypical, less likely, cardiac. She had some diarrhea, likely irritable bowel syndrome flair up. She was evaluated by gastroenterology. She had significant abdominal pain. She is wondering, celiac disease? Lab is pending at time of discharge. She has chronic pain. Oral Dilaudid is helping to reduce the pain. 2 mg's dilaudid, 30 tabs were prescribed at time of discharge. She will follow up with Dr. Zhao?) at [facility] for pain management. If her celiac antibody is positive, she will follow up with gastroenterology as outpatient and will have EGD. Please give me a call with any questions. [phone]. [facility]. Thank you.          REVIEW OF SYSTEMS:   She denies blood in her stool and black stools. She is having balance troubles and complains of vision changes. She is very sensitive to medications. She does not drink coffee, but has been drinking Pepsi and Ginger Ale. She reports that she is eating normally. All other systems negative.     PSFH:  She reports that she reacted to nitro once before a CT scan. Her daughter, Vanessa, is a doctor.   Patient Active Problem List   Diagnosis     Memory Lapses Or Loss     Osteopenia     Localized Primary Osteoarthritis Of The Carpometacarpal Joint Of The Right Thumb     Lower Back Pain     Hypercholesterolemia     Glossopharyngeal Neuralgia     Cataract     Benign Essential Hypertension     Psoriasis     Esophageal Reflux     Migraine Headache     Irritable Bowel Syndrome     Peripheral Neuropathy     Postmenopausal  Atrophic Vaginitis     Anxiety disorder     Knee pain, acute, right     Hypothyroid     Hyperglycemia     Foot pain, right     Chest pain     Chronic back pain     Anxiety     GERD (gastroesophageal reflux disease)     IBS (irritable bowel syndrome)     HTN (hypertension)     Dyslipidemia     Abnormal stress test     Coronary artery disease involving native coronary artery of native heart with angina pectoris       TOBACCO USE:  History   Smoking Status     Never Smoker   Smokeless Tobacco     Never Used     Comment: No exposure     Social History     Social History     Marital status:      Spouse name: N/A     Number of children: 3     Years of education: N/A     Occupational History     Not on file.     Social History Main Topics     Smoking status: Never Smoker     Smokeless tobacco: Never Used      Comment: No exposure     Alcohol use No     Drug use: No     Sexual activity: Not Currently     Birth control/ protection: None     Other Topics Concern     Not on file     Social History Narrative    3 children:  Vanessa Azam () who is often busy and unavailable, Ye who lives close by and helps out a lot.       OBJECTIVE:   Recent Results (from the past 240 hour(s))   Basic Metabolic Panel   Result Value Ref Range    Sodium 137 136 - 145 mmol/L    Potassium 3.6 3.5 - 5.0 mmol/L    Chloride 100 98 - 107 mmol/L    CO2 31 22 - 31 mmol/L    Anion Gap, Calculation 6 5 - 18 mmol/L    Glucose 98 70 - 125 mg/dL    Calcium 8.7 8.5 - 10.5 mg/dL    BUN 8 8 - 22 mg/dL    Creatinine 0.73 0.60 - 1.10 mg/dL    GFR MDRD Af Amer >60 >60 mL/min/1.73m2    GFR MDRD Non Af Amer >60 >60 mL/min/1.73m2   HM1 (CBC with Diff)   Result Value Ref Range    WBC 5.1 4.0 - 11.0 thou/uL    RBC 4.26 3.80 - 5.40 mill/uL    Hemoglobin 14.1 12.0 - 16.0 g/dL    Hematocrit 40.9 35.0 - 47.0 %    MCV 96 80 - 100 fL    MCH 33.1 27.0 - 34.0 pg    MCHC 34.5 32.0 - 36.0 g/dL    RDW 11.2 11.0 - 14.5 %    Platelets 266 140 - 440 thou/uL    MPV 10.7  8.5 - 12.5 fL    Neutrophils % 60 50 - 70 %    Lymphocytes % 31 20 - 40 %    Monocytes % 6 2 - 10 %    Eosinophils % 2 0 - 6 %    Basophils % 1 0 - 2 %    Neutrophils Absolute 3.1 2.0 - 7.7 thou/uL    Lymphocytes Absolute 1.6 0.8 - 4.4 thou/uL    Monocytes Absolute 0.3 0.0 - 0.9 thou/uL    Eosinophils Absolute 0.1 0.0 - 0.4 thou/uL    Basophils Absolute 0.0 0.0 - 0.2 thou/uL   Cardiac Catheterization   Result Value Ref Range    Cath LVEF Estimated 60 %   ECG 12 lead   Result Value Ref Range    SYSTOLIC BLOOD PRESSURE  mmHg    DIASTOLIC BLOOD PRESSURE  mmHg    VENTRICULAR RATE 62 BPM    ATRIAL RATE 62 BPM    P-R INTERVAL 134 ms    QRS DURATION 90 ms    Q-T INTERVAL 420 ms    QTC CALCULATION (BEZET) 426 ms    P Axis 68 degrees    R AXIS 38 degrees    T AXIS 39 degrees    MUSE DIAGNOSIS       Normal sinus rhythm  Normal ECG  When compared with ECG of 29-DEC-2016 18:40,  No significant change was found  Confirmed by QUINN BERGMAN, LATOYA LOC:JN ([id number]) on 1/5/2017 4:53:50 PM         Vitals:    01/09/17 1128   BP: 118/70   Pulse: 70   Temp: 98.8  F (37.1  C)   SpO2: 100%     Weight: 125 lb (56.7 kg)  Wt Readings from Last 3 Encounters:   01/09/17 125 lb (56.7 kg)   01/05/17 128 lb (58.1 kg)   01/04/17 128 lb (58.1 kg)     Body mass index is 21.46 kg/(m^2).      Physical Exam:  GENERAL APPEARANCE: A&A, NAD, well hydrated, well nourished  CV: RRR, no M/G/R   LUNGS: CTAB, normal respiratory effort  PSYCHIATRIC;  Mood appropriate, memory intact      Linsey Gutiérrez MD    ADDITIONAL HISTORY SUMMARIZED (2): Reviewed ER note from 12/29/16 regarding chest pain.  DECISION TO OBTAIN EXTRA INFORMATION (1): None.   RADIOLOGY TESTS (1): Reviewed CT report from 12/29/16.  LABS (1): None.  MEDICINE TESTS (1): Reviewed hospital labwork, angiogram, stress test, calcium CT chest  INDEPENDENT REVIEW (2 each): None.     The visit lasted a total of 35 minutes face to face with the patient. Over 50% of the time was spent counseling and  educating the patient about a follow up.    I, Eileen Weldon, am scribing for and in the presence of, Dr. Gutiérrez.    I, Dr. Gutiérrez, personally performed the services described in this documentation, as scribed by Eileen Weldon in my presence, and it is both accurate and complete.       MEDICATIONS   Current Outpatient Prescriptions   Medication Sig Dispense Refill     aspirin 81 MG EC tablet Take 81 mg by mouth daily.       cycloSPORINE (RESTASIS) 0.05 % ophthalmic emulsion Administer 1 drop to both eyes 2 (two) times a day.       estradiol (ESTRACE) 1 MG tablet Take 1 mg by mouth daily.       gabapentin (NEURONTIN) 600 MG tablet TAKE 2 TABLETS BY MOUTH THREE TIMES DAILY 180 tablet 2     levETIRAcetam (KEPPRA) 250 MG tablet Take 250 mg by mouth 2 (two) times a day. 1-2 times a day.       levothyroxine (SYNTHROID, LEVOTHROID) 100 MCG tablet Take 100 mcg by mouth Daily at 6:00 am.        lidocaine (XYLOCAINE) 5 % ointment Apply to painful midfoot daily as needed for pain. (Patient taking differently: Apply topically daily as needed. Apply to painful midfoot daily as needed for pain.) 35.44 g 0     lubiprostone (AMITIZA) 24 MCG capsule Take one capsule by mouth twice daily with meals. (Patient taking differently: Take 24 mcg by mouth 2 (two) times a day with meals. Take one capsule by mouth twice daily with meals.  ) 180 capsule 1     metoprolol tartrate (LOPRESSOR) 25 MG tablet Take 25 mg by mouth daily. Take 1 tablet every evening. May take another tablet if BP >170.       multivit-mineral-iron-lutein (CENTRUM SILVER ULTRA WOMEN'S) Tab Take 1 tablet by mouth daily.        NIFEdipine (NIFEDICAL XL) 30 MG 24 hr tablet Take 30 mg by mouth daily.        ondansetron (ZOFRAN) 4 MG tablet Take 4 mg by mouth every 8 (eight) hours as needed for nausea.       pantoprazole (PROTONIX) 40 MG tablet Take 40 mg by mouth daily.       ranitidine (ZANTAC) 150 MG tablet Take 150 mg by mouth every morning.       simvastatin  (ZOCOR) 20 MG tablet Take 20 mg by mouth bedtime.       sucralfate (CARAFATE) 1 gram tablet Take 1 tablet (1 g total) by mouth 4 (four) times a day before meals and at bedtime. 120 tablet 0     diclofenac sodium (VOLTAREN) 1 % Gel Apply topically bedtime. Apply to feet.       No current facility-administered medications for this visit.          Total data points: 4

## 2021-06-08 NOTE — TELEPHONE ENCOUNTER
Unable to get a hold of patient. Number is changed or disconnected. Will wait for patient to reach out.
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
## 2021-06-08 NOTE — TELEPHONE ENCOUNTER
This patient needs an emergency room follow-up appointment with me next week face to face in clinic. Fractured nose and injury to TMJ. If patient has ENT appointment then I would not need to see her but I did not see any mention of referral to ENT.    Dr. Meza

## 2021-06-08 NOTE — PROGRESS NOTES
Lissa BARRIENTOS Nic  [address]  [place] [address]  [phone] (home)     Procedure cardiologist:  Dr. Ferrara or Dr. Blount  PCP:  Linsey Gutiérrez MD  H&P completed by:  1/4/17  Admit date and time:  1/5/17 at 0630  Anticoagulation: None  CPAP: No  Previous PCI: none  Bypass Grafts: No  Renal Issues: No  Diabetic?: No  Device?: No     Angiogram Teaching    Reason for Visit:  Patient and pts ex- Rojelio were seen (pt very sleepy from pain medications per Rojelio) for pre-procedure education in preparation for: coronary angiogram possible PCI    Procedure Prep:  Primary Cardiologist note dated: 1/4/17  EKG results obtained, dated: morning of procedure  Hemogram results obtained: morning of procedure  Basic Metabolic Panel results obtained: morning of procedure  Lipid Profile results obtained: 12/27/16    Patient Education  Explained indications/risks for diagnostic evaluation, including one or more of the following:  coronary angiogram, left ventriculogram, percutaneous arteritomy closure, less than 0.1% of acute myocardial infarction and CVA or death or any of the following to the degree that it could threaten life:  allergic reaction, arrhythmia, renal failure, hemorrhage, thrombosis and infection  Explained indications/risks for therapeutic interventions, including one or more of the following: PTCA, artherectomy, stent, 2% or less risk of MI, less than 1% risk of CVA, emergency heart surgery, death, less than 4 % risk of vascular injury requiring surgical repair or blood transfusion and 20-30% risk of restonosis with a bare metal stent.  These risks are in addition to baseline risks associated with a Diagnostic Evaluation.  Patient states understanding of procedure and risks and agrees to proceed.    Pre-procedure instructions  Patient instructed to be NPO after midnight.  Patient instructed to arrange for transportation home following procedure.  Patient instructed to have a responsible  adult with them for 24 hours post-procedure.  Post-procedure follow up process.  Conscious sedation discussed.    Pre-procedure medication instructions  Patient instructed on antiplatelet medication.  Continue medications as scheduled, with a small amount of water on the day of the procedure unless indicated.  Patient instructed to take 81 mg of Aspirin am of procedure: Yes  Other medication: instructed to only take aspirin, gabapentin, isosorbide, synthroid, metoprolol, and nifedipine a.m. of the procedure.    *PATIENTS RECORDS AVAILABLE IN [facility] UNLESS OTHERWISE INDICATED*      Patient Active Problem List   Diagnosis     Memory Lapses Or Loss     Osteopenia     Localized Primary Osteoarthritis Of The Carpometacarpal Joint Of The Right Thumb     Lower Back Pain     Hypercholesterolemia     Glossopharyngeal Neuralgia     Cataract     Benign Essential Hypertension     Psoriasis     Esophageal Reflux     Migraine Headache     Irritable Bowel Syndrome     Peripheral Neuropathy     Postmenopausal Atrophic Vaginitis     Anxiety disorder     Knee pain, acute, right     Hypothyroid     Hyperglycemia     Foot pain, right     Chest pain     Chronic back pain     Anxiety     GERD (gastroesophageal reflux disease)     IBS (irritable bowel syndrome)     HTN (hypertension)     Dyslipidemia       Current Outpatient Prescriptions   Medication Sig Dispense Refill     aspirin 81 MG EC tablet Take 81 mg by mouth daily.       cycloSPORINE (RESTASIS) 0.05 % ophthalmic emulsion Administer 1 drop to both eyes 2 (two) times a day.       diclofenac sodium (VOLTAREN) 1 % Gel Apply topically bedtime. Apply to feet.       estradiol (ESTRACE) 1 MG tablet Take 1 mg by mouth daily.       gabapentin (NEURONTIN) 600 MG tablet Take 1,200 mg by mouth 3 (three) times a day.       HYDROmorphone (DILAUDID) 2 MG tablet Take 1 tablet (2 mg total) by mouth every 6 (six) hours as needed (only for severe pain). 30 tablet 0     isosorbide mononitrate (IMDUR)  30 MG 24 hr tablet Take 1 tablet (30 mg total) by mouth daily. 30 tablet 1     levETIRAcetam (KEPPRA) 250 MG tablet Take 250 mg by mouth. 1-2 times a day.       levothyroxine (SYNTHROID, LEVOTHROID) 100 MCG tablet Take 100 mcg by mouth Daily at 6:00 am.        lidocaine (XYLOCAINE) 5 % ointment Apply to painful midfoot daily as needed for pain. (Patient taking differently: Apply topically daily as needed. Apply to painful midfoot daily as needed for pain.) 35.44 g 0     lifitegrast (XIIDRA) 5 % Dpet Apply 1 drop to eye 2 (two) times a day.       lubiprostone (AMITIZA) 24 MCG capsule Take one capsule by mouth twice daily with meals. (Patient taking differently: Take 24 mcg by mouth 2 (two) times a day with meals. Take one capsule by mouth twice daily with meals.  ) 180 capsule 1     metoprolol tartrate (LOPRESSOR) 25 MG tablet Take 25-50 mg by mouth bedtime. Take 1 tablet every evening. May take another tablet if BP >170.       multivit-mineral-iron-lutein (CENTRUM SILVER ULTRA WOMEN'S) Tab Take 1 tablet by mouth daily.        NIFEdipine (NIFEDICAL XL) 30 MG 24 hr tablet Take 30 mg by mouth daily.        ondansetron (ZOFRAN) 4 MG tablet Take 4 mg by mouth every 8 (eight) hours as needed for nausea.       oxyCODONE (OXY-IR) 5 mg capsule Take 5-10 mg by mouth every 6 (six) hours as needed.        pantoprazole (PROTONIX) 40 MG tablet Take 40 mg by mouth daily.       ranitidine (ZANTAC) 150 MG tablet Take 150 mg by mouth every morning.       simvastatin (ZOCOR) 20 MG tablet Take 20 mg by mouth bedtime.       sucralfate (CARAFATE) 1 gram tablet Take 1 tablet (1 g total) by mouth 4 (four) times a day before meals and at bedtime. 120 tablet 0     No current facility-administered medications for this visit.        Allergies   Allergen Reactions     Penicillins Angiodema     Tachycardia     Carbamazepine Unknown     Other reaction(s): Ataxia     Ethylhexylglycerin Other (See Comments)     boniva caused racing heart, chest  pain symptoms     Ibandronate      Morphine Other (See Comments)     Mental status change     Omega-3s-Dha-Epa-Fish Oil Other (See Comments)     Serotonin Hives and Itching     Sulfa (Sulfonamide Antibiotics) Unknown         KENJI Painter

## 2021-06-08 NOTE — PROGRESS NOTES
ASSESSMENT/PLAN:     1. Benign Essential Hypertension    Blood pressure is borderline today.  We have a number of options for treatment but at this point I prefer to recheck in 2 months.  We will send a request through for prior authorization for the calcium channel blocker.  2. Esophageal reflux  Carafate works well for her and we will continue this.  - sucralfate (CARAFATE) 1 gram tablet; Take 1 tablet (1 g total) by mouth 4 (four) times a day before meals and at bedtime.  Dispense: 360 tablet; Refill: 3    3. Glossopharyngeal neuralgia  Patient has requested application to be seen at [facility].  I support this.  I also support her staying off of narcotic medications as they have caused significant side effects in her life.  She appears to be healing and she is much less anxious today.  On the other hand, I fully appreciate that she has significant facial pain.    4. Hypothyroid  TSH was checked recently in December and was 0.57.  Will continue same dose and recheck yearly.      There are no Patient Instructions on file for this visit.  Medications Discontinued During This Encounter   Medication Reason     sucralfate (CARAFATE) 1 gram tablet Reorder     levETIRAcetam (KEPPRA) 250 MG tablet Side effects     estradiol (ESTRACE) 1 MG tablet Therapy completed         CHIEF COMPLAINT  Chief Complaint   Patient presents with     Follow-up     DISCUSS MEDICATION, NO MORE PAIN  MEDS, GOING TO [facility]        HPI:  Lissa Lucero is a 66 y.o. female presenting to the clinic today for a follow up regarding medication management. She reports that overall, she is starting to feel better. She has been seeing Dr. Baker at [facility] for pain medications, but no longer plans to see him for care. He was prescribing Nifedical XL, levothyroxine, and metoprolol. She would like to now get these prescriptions refilled here.  She is no longer using Keppra, and is refusing to use narcotics. She has applied to the  [facility] and is waiting to hear back in the next couple months.      Coronary Artery Disease: She had a sedimentation rate done, which returned normal so she did not need an artery biopsy.   She visited Dr. Baker on 1/12/17 and had an ultrasound of her carotid done. This showed mild atheromatous plaque in the right ICA, moderate, 50-69% stenosis in the right ICA by velocity criteria, and a mild interval increase in peak systolic velocity in the ICA compared to 2015, showing significant change from prior.    GERD: She continues taking Carafate daily to control her reflux. She reports that she takes this immediately after she wakes up, and it continues to work well for her. She does note that is causes some gas. She had some indigestion a few days ago. She would like a refill of this. She is having some discharge in her throat that she feels she needs to cough up.       REVIEW OF SYSTEMS:   She continues to struggle with sleep. She states that she has gained some weight, and is happy about this. She is doing activity as she can, although she has not been back to the [facility] or done Yoga as she continues to fatigue easily. She mentions that she had diarrhea two days ago. She had a CT of her chest on 1/12/17 to follow up on her lung nodule. All other systems negative.     PSFH:  She has grandchildren. As reviewed above.     Patient Active Problem List   Diagnosis     Memory Lapses Or Loss     Osteopenia     Localized Primary Osteoarthritis Of The Carpometacarpal Joint Of The Right Thumb     Lower Back Pain     Hypercholesterolemia     Glossopharyngeal Neuralgia     Cataract     Benign Essential Hypertension     Psoriasis     Esophageal Reflux     Migraine Headache     Peripheral Neuropathy     Postmenopausal Atrophic Vaginitis     Anxiety disorder     Hypothyroid     Hyperglycemia     Foot pain, right     Chest pain     Chronic back pain     IBS (irritable bowel syndrome)     Abnormal stress test     Coronary  artery disease involving native coronary artery of native heart with angina pectoris       TOBACCO USE:  History   Smoking Status     Never Smoker   Smokeless Tobacco     Never Used     Comment: No exposure     Social History     Social History     Marital status:      Spouse name: N/A     Number of children: 3     Years of education: N/A     Occupational History     Not on file.     Social History Main Topics     Smoking status: Never Smoker     Smokeless tobacco: Never Used      Comment: No exposure     Alcohol use No     Drug use: No     Sexual activity: Not Currently     Birth control/ protection: None     Other Topics Concern     Not on file     Social History Narrative    3 children:  Azam Mendez () who is often busy and unavailable, Ye who lives close by and helps out a lot.       OBJECTIVE:       Vitals:    01/26/17 1156   BP: 138/88   Pulse:      Weight: 129 lb (58.5 kg)  Wt Readings from Last 3 Encounters:   01/26/17 129 lb (58.5 kg)   01/09/17 125 lb (56.7 kg)   01/05/17 128 lb (58.1 kg)     Body mass index is 22.14 kg/(m^2).      Physical Exam:  GENERAL APPEARANCE: A&A, NAD, well hydrated, well nourished.  Senia appears markedly imporved today.  She has good eye contact with a bright social smile.  She appears well rested.   CV: RRR, no M/G/R   LUNGS: CTAB, normal respiratory effort  PSYCHIATRIC;  Mood appropriate, memory intact  No extremity edema    Linsey Gutiérrez MD    ADDITIONAL HISTORY SUMMARIZED (2): Reviewed Dr. Baker note from 1/12/16 regarding coronary artery disease.  DECISION TO OBTAIN EXTRA INFORMATION (1): None.   RADIOLOGY TESTS (1): None.  LABS (1): None.  MEDICINE TESTS (1): None.  INDEPENDENT REVIEW (2 each): None.     The visit lasted a total of 25 minutes face to face with the patient. Over 50% of the time was spent counseling and educating the patient about a follow up.    Eileen GARRETT, am scribing for and in the presence of, Dr. Gutiérrez.    Dr. Brock GARRETT,  personally performed the services described in this documentation, as scribed by Eileen Weldon in my presence, and it is both accurate and complete.       MEDICATIONS   Current Outpatient Prescriptions   Medication Sig Dispense Refill     aspirin 81 MG EC tablet Take 81 mg by mouth daily.       diclofenac sodium (VOLTAREN) 1 % Gel Apply topically bedtime. Apply to feet.       estropipate (OGEN) 0.75 MG tablet TK ONE T PO ONCE D  3     gabapentin (NEURONTIN) 600 MG tablet TAKE 2 TABLETS BY MOUTH THREE TIMES DAILY 180 tablet 2     levothyroxine (SYNTHROID, LEVOTHROID) 100 MCG tablet Take 100 mcg by mouth Daily at 6:00 am.        lubiprostone (AMITIZA) 24 MCG capsule Take one capsule by mouth twice daily with meals. (Patient taking differently: Take 24 mcg by mouth 2 (two) times a day with meals. Take one capsule by mouth twice daily with meals.  ) 180 capsule 1     metoprolol tartrate (LOPRESSOR) 25 MG tablet Take 25 mg by mouth daily. Take 1 tablet every evening. May take another tablet if BP >170.       multivit-mineral-iron-lutein (CENTRUM SILVER ULTRA WOMEN'S) Tab Take 1 tablet by mouth daily.        NIFEdipine (NIFEDICAL XL) 30 MG 24 hr tablet Take 30 mg by mouth daily.        ondansetron (ZOFRAN) 4 MG tablet Take 4 mg by mouth every 8 (eight) hours as needed for nausea.       pantoprazole (PROTONIX) 40 MG tablet Take 40 mg by mouth daily.       ranitidine (ZANTAC) 150 MG tablet Take 150 mg by mouth every morning.       simvastatin (ZOCOR) 20 MG tablet Take 20 mg by mouth bedtime.       sucralfate (CARAFATE) 1 gram tablet Take 1 tablet (1 g total) by mouth 4 (four) times a day before meals and at bedtime. 360 tablet 3     cycloSPORINE (RESTASIS) 0.05 % ophthalmic emulsion Administer 1 drop to both eyes 2 (two) times a day.       lidocaine (XYLOCAINE) 5 % ointment Apply to painful midfoot daily as needed for pain. (Patient taking differently: Apply topically daily as needed. Apply to painful midfoot daily as  needed for pain.) 35.44 g 0     No current facility-administered medications for this visit.          Total data points: 2

## 2021-06-08 NOTE — TELEPHONE ENCOUNTER
Refill Approved    Rx renewed per Medication Renewal Policy. Medication was last renewed on 3/12/20, last OV 5/4/20.    Evi Pollack, [facility] Triage/Med Refill 6/6/2020     Requested Prescriptions   Pending Prescriptions Disp Refills     metoprolol tartrate (LOPRESSOR) 25 MG tablet [Pharmacy Med Name: METOPROLOL TARTRATE 25MG TABS] 90 tablet 2     Sig: TAKE ONE TABLET BY MOUTH AT BEDTIME       Beta-Blockers Refill Protocol Passed - 6/5/2020 10:27 AM        Passed - PCP or prescribing provider visit in past 12 months or next 3 months     Last office visit with prescriber/PCP: 2/24/2020 Linsey Gutiérrez MD OR same dept: 2/24/2020 Linsey Gutiérrez MD OR same specialty: 2/24/2020 Linsey Gutiérrez MD  Last physical: 5/8/2017 Last MTM visit: Visit date not found   Next visit within 3 mo: Visit date not found  Next physical within 3 mo: Visit date not found  Prescriber OR PCP: Linsey Gutiérrez MD  Last diagnosis associated with med order: 1. Hypertension  - metoprolol tartrate (LOPRESSOR) 25 MG tablet [Pharmacy Med Name: METOPROLOL TARTRATE 25MG TABS]; TAKE ONE TABLET BY MOUTH AT BEDTIME  Dispense: 90 tablet; Refill: 2    If protocol passes may refill for 12 months if within 3 months of last provider visit (or a total of 15 months).             Passed - Blood pressure filed in past 12 months     BP Readings from Last 1 Encounters:   06/03/20 151/77

## 2021-06-09 NOTE — TELEPHONE ENCOUNTER
Please let her know the xray shows normal alignment and no evidence of hardware issues.    Ravi Brannon, CNP

## 2021-06-09 NOTE — PROGRESS NOTES
Assessment:     1. Acute non-recurrent pansinusitis  azithromycin (ZITHROMAX) 250 MG tablet   2. Benign Essential Hypertension     3. Coronary artery disease involving native coronary artery of native heart with angina pectoris (H)            Plan:         We will cover with Azithromycin as directed for acute frontal/maxillary sinusitis. We reviewed use of OTC analgesics, decongestants, and/or mucinex, as well as increased fluids, rest and humidity, etc. She will continue her same medications and call if symptoms are not improving. She will call or return to clinic with any ongoing or worsening symptoms.         Subjective:               Lissa Lucero is a 70 y.o. female with history of hypertension and CAD who presents for evaluation of headaches, nasal congestion and facial pain. She is s/p a fall on 6/3/20 while out walking, and fractured her nose at that time. Those symptoms had been slowly improving but for the last 2-3 days, she has had headaches in the frontal area and face. Previously the pain had been more in the side of the face and neck. She does have a history of summer allergies and she typically takes zyrtec for that. She also notes diarrhea on/off for the last 5 days and some recent chills. Her hearing has been a bit muffled as well. Her anxiety level has been fairly high, and she was tested from COVID, and was negative.           She does note some concussion type symptoms since her fall, and she has a history of a concussion last fall as well. This headache has been different from her recent symptoms.       The following portions of the patient's history were reviewed and updated as appropriate: allergies, current medications, past family history, past medical history, past social history, past surgical history and problem list.    Review of Systems  A 12 point comprehensive review of systems was negative except as noted.      Objective:        /58 (Patient Position: Sitting, Cuff Size:  Adult Regular)   Pulse (!) 52   Wt 134 lb 6 oz (61 kg)   LMP 10/12/1981   SpO2 98%   BMI 23.07 kg/m    Physical Exam:  GEN: Alert and oriented, NAD,  well nourished  SKIN:  Normal skin turgor, no lesions/rashes   HEENT: moist mucous membranes, mod mucosal congestion. Mild-mod maxillary and frontal tenderness.    NECK: Normal.  No adenopathy or thyromegaly.  CV: Regular rate and rhythm, no murmurs.   LUNGS: Clear to auscultation bilaterally.    ABDOMEN: Soft, non-tender, non-distended, no masses   EXTREMITY: No edema, cyanosis  NEURO: Grossly normal.

## 2021-06-09 NOTE — PROGRESS NOTES
Chief Complaint   Patient presents with     Headache     right side of head constant for the past week       HPI: Patient presents today with complaints of a headache along the right side of her head.  Initially she thought that this occurred a week ago, but upon further interview it sounds like this all stems back to the fall that she experienced earlier this month.  The patient has a known history of neuropathy in her feet and fell flat on her face walking across an intersection.  EMS was called who brought her to the emergency department.  CT of the head showed no bleed on the brain, however there were multiple nasal fractures.  She followed up with PCP who offered ENT referral, but the patient declined.  No breathing issues out of the nose and no significant pain.    As far as the headache goes, it is along the entire right side of the head and usually worsens as the day goes on.  The patient admits that the headache tends to get worse if she does more reading or staring at screens.  There will be some intermittent nausea if the headache gets significant without any current vomiting episodes.  She did not have any loss of consciousness with the fall.  No current dizziness or lightheadedness.  No hearing changes.  No weakness in any of her extremities.  Over-the-counter acetaminophen seems to help the headache.    Patient has concerns that the headache may be related to carotid stenosis.  She has a history of a left carotid endarterectomy and 2 years ago had an ultrasound which showed 50-69% stenosis of the right with recommendations to repeat in 2 years.  No TIA or stroke symptoms.    Known history of hyperglycemia in the past.  Usually will get an A1c yearly.  Due for recheck now.    ROS:Review of Systems - negative except for what's listed in the HPI    SH: The Patient's  reports that she has never smoked. She has never used smokeless tobacco. She reports that she does not drink alcohol or use drugs.      FH:  The Patient's family history includes Alzheimer's disease in her father; Dementia in her mother; Diabetes type II in her father; ROSA MARIA disease in her brother and sister; Heart disease in her father; Hypertension in her father and mother; Osteoporosis in her mother.     Meds:    Current Outpatient Medications on File Prior to Visit   Medication Sig Dispense Refill     acetaminophen (TYLENOL) 500 MG tablet Take 500-1,000 mg by mouth every 6 (six) hours as needed for pain.       doxycycline (MONODOX) 50 MG capsule        levothyroxine (SYNTHROID, LEVOTHROID) 50 MCG tablet TAKE ONE AND ONE-HALF TABLETS BY MOUTH EVERY  tablet 1     metoprolol tartrate (LOPRESSOR) 25 MG tablet Take 1 tablet (25 mg total) by mouth at bedtime. 90 tablet 2     multivit-mineral-iron-lutein (CENTRUM SILVER ULTRA WOMEN'S) Tab Take 1 tablet by mouth daily.        NIFEdipine (PROCARDIA XL) 30 MG 24 hr tablet TAKE ONE TABLET BY MOUTH EVERY DAY 90 tablet 3     RESTASIS 0.05 % ophthalmic emulsion        simvastatin (ZOCOR) 20 MG tablet TAKE ONE TABLET BY MOUTH AT BEDTIME 90 tablet 1     No current facility-administered medications on file prior to visit.        O:  /62   Pulse (!) 58   Wt 135 lb (61.2 kg)   LMP 10/12/1981   SpO2 97%   BMI 23.17 kg/m      Physical Examination:   General appearance - alert, well appearing, and in no distress  Mental status - alert, oriented to person, place, and time  Eyes -PERRLA  Ears - bilateral TM's and external ear canals normal.  No hemotympanum  Nose -no active bleeding  Mouth -adequate dentition  Neck -no carotid bruit  Chest - clear to auscultation, no wheezes, rales or rhonchi, symmetric air entry  Heart - normal rate and regular rhythm, S1 and S2 normal, no murmurs noted  Neurological - alert, oriented, normal speech, no focal findings or movement disorder noted, neck supple without rigidity, detailed examination of cranial nerves II through XII intact, motor and sensory grossly normal  bilaterally, normal muscle tone, no tremors, strength 5/5  Extremities - peripheral pulses normal, no pedal edema, no cyanosis  Skin - normal coloration and turgor.      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  Discussed that brain rest is the best treatment right now.  No neurological deficits or any worrisome signs on exam to indicate a more insidious cause of the headaches.  Given that she is due for recheck of the carotid ultrasound, will order that.  Update yearly labs as well.  If headaches do persist, she can let me know and I will be happy to connect her with the concussion clinic.    1. Stenosis of right carotid artery  - US Carotid Bilateral; Future  - Lipid [place] RANDOM    2. 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 Post-concussion headache    4. Hyperglycemia  - Glycosylated Hemoglobin A1c        Ravi Brannon, CNP

## 2021-06-09 NOTE — TELEPHONE ENCOUNTER
Please inform the patient that her colon cancer screening test(Cologuard) was negative.  Good news! No additional colon cancer screening needed for 3 years.    Dr. Meza

## 2021-06-09 NOTE — TELEPHONE ENCOUNTER
I told showed her the X-rays and that they looked good. Please remind her of the negative results per radiology.    Dr. Meza

## 2021-06-09 NOTE — TELEPHONE ENCOUNTER
Patient saw Ravi Brannon CNP for concusison and broken nose fall on June.  She is still having headaches.  It's not in the front of her head, where she was injured. Right side down underneath right ear is where the pain is located.  It's a post concussion headache. Take home instructions state there is a concussion clinic she can contact if needed. She would like to do that.  Can we connect with the [facility] concussion clinic? We couldn't find an [facility] concussion clinic phone number. Please call the patient to give her that. Otherwise, we found a [place] number of:   [phone]. If it's okay, you could give her that instead. Please call patient at:  [phone].  Thank you,  Nadine Eldridge RN  [facility]    Reason for Disposition    Nursing judgment    Additional Information    Negative: Nursing judgment    Negative: Nursing judgment    Negative: Nursing judgment    Protocols used: NO PROTOCOL AVAILABLE - INFORMATION ONLY-A-[place]
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
## 2021-06-09 NOTE — PROGRESS NOTES
ASSESSMENT/PLAN:     1. Hypothyroidism  Patient appears to be doing very well.    She will follow up with Dr Ornelas in looking at alternative pain options.  She appears to be doing well off narcotics.      - Thyroid Stimulating Hormone (TSH) is pending, will inform her of results when available.      Routine follow up.  Linsey Gutiérrez MD      CHIEF COMPLAINT  Chief Complaint   Patient presents with     Hypothyroidism     blood work ,and thyroid check        HPI:  Lissa Lucero is a 67 y.o. female presenting to the clinic today for hypothyroidism. She reports that she came into the lab on 2/22/17 to have her TSH checked after experiencing night sweats and hair problems. This lab work can not be found in her chart. She continues taking 100 mcg of levothyroxine daily, although she switched from taking this medication when she wakes up, to taking it between 2 am and 4 am when she wakes to use the bathroom. Since making this change a month ago, she continues to experience these symptoms and wanted to see her TSH results from last week. She had her thyroid medication renewed, but wanted her TSH tested before this to see if her dose should be changed. Her TSH level was 0.57 when checked in 12/27/16.      Diarrhea: She reports that she experiences diarrhea for the first two weeks of February. She had also been having diarrhea on and off before this. She was not able to stop this diarrhea and decided to stop taking her Carafate and Amitiza. It took about a week after stopping this medication for her diarrhea to resolve, but she has been having solid stools since and feels good.     Glossopharyngeal Neuralgia: She visited the Pain Clinic on 2/6/17 and saw Dr. Ornelas. They looked into acupuncture, but she has to pay $600 out of pocket for each session, and does not want to do this.    She has a follow up appointment on 3/10/17.    Chronic Dry Eye: She has been seeing an eye doctor for wet-dry eye and states that she has  tried every over-the-counter and prescription eye drop without success. She saw an eye surgeon through [facility] who told her that she had open tear ducts, and her eye should be taking the drops. He also said that she had wrinkled eyes.        REVIEW OF SYSTEMS:   She continues to watch what she eats to avoid loose bowel movements. All other systems negative.     PSFH:  She has an allergy to Sulfa.   Patient Active Problem List   Diagnosis     Memory Lapses Or Loss     Osteopenia     Localized Primary Osteoarthritis Of The Carpometacarpal Joint Of The Right Thumb     Lower Back Pain     Hypercholesterolemia     Glossopharyngeal Neuralgia     Cataract     Benign Essential Hypertension     Psoriasis     Esophageal Reflux     Migraine Headache     Peripheral Neuropathy     Postmenopausal Atrophic Vaginitis     Anxiety disorder     Hypothyroid     Hyperglycemia     Foot pain, right     Chest pain     Chronic back pain     IBS (irritable bowel syndrome)     Abnormal stress test     Coronary artery disease involving native coronary artery of native heart with angina pectoris       TOBACCO USE:  History   Smoking Status     Never Smoker   Smokeless Tobacco     Never Used     Comment: No exposure     Social History     Social History     Marital status:      Spouse name: N/A     Number of children: 3     Years of education: N/A     Occupational History     Not on file.     Social History Main Topics     Smoking status: Never Smoker     Smokeless tobacco: Never Used      Comment: No exposure     Alcohol use No     Drug use: No     Sexual activity: Not Currently     Birth control/ protection: None     Other Topics Concern     Not on file     Social History Narrative    3 children:  Azam Mendez () who is often busy and unavailable, Ye who lives close by and helps out a lot.       OBJECTIVE:   Recent Results (from the past 240 hour(s))   Thyroid Stimulating Hormone (TSH)   Result Value Ref Range    TSH 0.56 0.30 -  5.00 uIU/mL       Vitals:    02/27/17 0933   BP: 115/72   Pulse: 60     Weight: 135 lb (61.2 kg)  Wt Readings from Last 3 Encounters:   02/27/17 135 lb (61.2 kg)   01/26/17 129 lb (58.5 kg)   01/09/17 125 lb (56.7 kg)     Body mass index is 23.17 kg/(m^2).      Physical Exam:  GENERAL APPEARANCE: A&A, NAD, well hydrated, well nourished  NECK: Supple, without lymphadenopathy, no thyroid mass  CV: RRR, no M/G/R   LUNGS: CTAB, normal respiratory effort  PSYCHIATRIC;  Mood appropriate, memory intact      Linsey Gutiérrez MD      ADDITIONAL HISTORY SUMMARIZED (2): Reviewed Pain Clinic note from 2/6/17 regarding glossopharyngeal neuralgia.   DECISION TO OBTAIN EXTRA INFORMATION (1): Accessed Care Everywhere.   RADIOLOGY TESTS (1): None.  LABS (1): Reviewed labs and ordered labs.  MEDICINE TESTS (1): None.  INDEPENDENT REVIEW (2 each): None.     The visit lasted a total of 24 minutes face to face with the patient. Over 50% of the time was spent counseling and educating the patient about hypothyroidism.    I, Eileen Weldon, am scribing for and in the presence of, Dr. Gutiérrez.    I, Dr. Gutiérrez, personally performed the services described in this documentation, as scribed by Eileen Weldon in my presence, and it is both accurate and complete.       MEDICATIONS   Current Outpatient Prescriptions   Medication Sig Dispense Refill     aspirin 81 MG EC tablet Take 81 mg by mouth daily.       cycloSPORINE (RESTASIS) 0.05 % ophthalmic emulsion Administer 1 drop to both eyes 2 (two) times a day.       diclofenac sodium (VOLTAREN) 1 % Gel Apply topically bedtime. Apply to feet.       estropipate (OGEN) 0.75 MG tablet TK ONE T PO ONCE D  3     gabapentin (NEURONTIN) 600 MG tablet TAKE 2 TABLETS BY MOUTH THREE TIMES DAILY 180 tablet 2     levothyroxine (SYNTHROID, LEVOTHROID) 100 MCG tablet TAKE 1 TABLET BY MOUTH DAILY BEFORE BREAKFAST 90 tablet 1     lidocaine (XYLOCAINE) 5 % ointment Apply to painful midfoot daily as needed  for pain. (Patient taking differently: Apply topically daily as needed. Apply to painful midfoot daily as needed for pain.) 35.44 g 0     lubiprostone (AMITIZA) 24 MCG capsule Take one capsule by mouth twice daily with meals. (Patient taking differently: Take 24 mcg by mouth 2 (two) times a day with meals. Take one capsule by mouth twice daily with meals.  ) 180 capsule 1     metoprolol tartrate (LOPRESSOR) 25 MG tablet TAKE ONE TABLET BY MOUTH EVERY EVENING. MAY TAKE A 2ND TABLET IF BLOOD PRESSURE IS GREATER THAN 170 180 tablet 1     multivit-mineral-iron-lutein (CENTRUM SILVER ULTRA WOMEN'S) Tab Take 1 tablet by mouth daily.        NIFEdipine (NIFEDICAL XL) 30 MG 24 hr tablet TAKE ONE TABLET BY MOUTH ONCE DAILY BEFORE A MEAL. 90 tablet 3     ondansetron (ZOFRAN) 4 MG tablet Take 4 mg by mouth every 8 (eight) hours as needed for nausea.       pantoprazole (PROTONIX) 40 MG tablet TAKE 1 TABLET BY MOUTH DAILY 90 tablet 1     ranitidine (ZANTAC) 150 MG tablet Take 150 mg by mouth every morning.       simvastatin (ZOCOR) 20 MG tablet Take 20 mg by mouth bedtime.       No current facility-administered medications for this visit.          Total data points: 4

## 2021-06-09 NOTE — PROGRESS NOTES
"DIAGNOSIS: Right first metatarsal cuneiform joint arthritis    PLAN: She was given an injection of 4 mg of dexamethasone sodium phosphate into the right first metatarsal cuneiform joint.  She tolerated that well.  She will consider surgery to fuse this joint.  Timing is not good now with upcoming eye surgeries.  There is a tiny plantar fibroma near the right first metatarsal head.  That might also be addressed surgically, or with radiation therapy as needed.  Fifteen minute visit, greater than half our time spent counseling and coordinating medical care.     SUBJECTIVE: The patient returns to the [facility] for follow up of right foot pain.  Previous x-ray has demonstrated significant arthritis of the first metatarsal cuneiform joint.  Pretty good relief with cortisone injection in October 2016.  Pain has returned over the past 6 weeks.  The joint continues to be swollen and painful activities.  We have talked briefly about surgery in the past.  She has a tiny recurrent plantar fibroma near the left first metatarsal head.  That was removed surgically nearly 10 years ago.  Bilateral neuropathy is treated with the maximum dose of gabapentin.    PHYSICAL EXAM:  Ht 5' 4\" (1.626 m)  Wt 141 lb (64 kg)  BMI 24.2 kg/m2  General: Pleasant 67 y.o. female in no acute distress.  Vascular: DP pulses are diminished. PT pulses are diminished. Pedal hair is diminished. Feet are warm to the touch.  Cardiac: Pulse is regular.  Lymphatic: No edema at the ankles.  Neuro: Sensation in the feet is altered. Patient describes paresthesias.  Derm: No open lesions.  Musculoskeletal: Palpable bony prominence around the first metatarsal cuneiform joint.  Spurring also noted at the first metatarsal phalangeal joint.  Subdermal fibrous prominence on the plantar aspect of the right foot just proximal to the first metatarsal head and the medial band of the plantar fascia.  Roughly 5 mm in diameter.       "

## 2021-06-09 NOTE — TELEPHONE ENCOUNTER
Please call the patient and if her pain is 8/10 she should be seen in the office today.   If less than 8/10 pain then I could do a VV tomorrow if she wishes.  If unable to see patient in clinic with 8/10 pain or worse then she would need to go to walk in clinic or ER.  Thanks,  Dr. Meza

## 2021-06-09 NOTE — TELEPHONE ENCOUNTER
Pt is using Tylenol and ice which helps. She is concerned about her carotid artery - wants to be sure this isn't causing the pain she's having. Scheduled in person visit today.

## 2021-06-09 NOTE — TELEPHONE ENCOUNTER
Pt notified of information below and verified understanding.     She would like to know the results of foot xray that was done last Friday.     Brittany Rios, CMA

## 2021-06-09 NOTE — TELEPHONE ENCOUNTER
Question following Office Visit  When did you see your provider: 06/12/20  What is your question: Patient states she fell on 06/03/20 broken Nose landed on Face now her Headache is shifted to Right side of Face before it was in center of head her Neck is really Sore patient is requesting provider what is the next step of treatment . Please advise .  Okay to leave a detailed message: No

## 2021-06-09 NOTE — PATIENT INSTRUCTIONS - HE
This is acting a lot like a post concussion headache. Brain rest is best!    Use the tylenol as needed.    If the headaches persist, let me know and I can connect you with the concussion clinic.    You should be getting a call about setting up that carotid ultrasound. Let me know if you don't hear anything.

## 2021-06-10 NOTE — PROGRESS NOTES
Please call Senia and inform her that the CBC was normal. White blood cell count and platelets normal. The RBC's normal with no evidence for anemia.    Dr. Meza
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
## 2021-06-10 NOTE — ANESTHESIA POSTPROCEDURE EVALUATION
Patient: Lisas Lucero  RIGHT LAPIDUS BUNIONECTOMY  Anesthesia type: MAC    Patient location: PACU  Last vitals:   Vitals:    05/19/17 1340   BP: 134/63   Pulse: 76   Resp: 16   Temp:    SpO2: 99%     Post vital signs: stable  Level of consciousness: awake and responds to simple questions  Post-anesthesia pain: pain controlled  Post-anesthesia nausea and vomiting: no  Pulmonary: unassisted, return to baseline  Cardiovascular: stable and blood pressure at baseline  Hydration: adequate  Anesthetic events: no    QCDR Measures:  ASA# 11 - Sarai-op Cardiac Arrest: ASA11B - Patient did NOT experience unanticipated cardiac arrest  ASA# 12 - Sarai-op Mortality Rate: ASA12B - Patient did NOT die  ASA# 13 - PACU Re-Intubation Rate: ASA13B - Patient did NOT require a new airway mgmt  ASA# 10 - Composite Anes Safety: ASA10A - No serious adverse event  ASA# 38 - New Corneal Injury: ASA38A - No new exposure keratitis or corneal abrasion in PACU       Additional Notes:

## 2021-06-10 NOTE — PROGRESS NOTES
Assessment: Six days status post right first metatarsal cuneiform joint fusion.    Plan: Bandage changed today, return in one week. Remain non-weightbearing until next visit. Order written for kneeling scooter.      Subjective: The patient returns to the [facility] for a first postoperative visit. Surgery was six days ago on May 19, 2017 at [facility]. The patient denies any falls or new injuries.  She has blisters on her hands from using the walker.  She asks about a knee walker instead.  Pain well controlled with Dilaudid over the weekend.  She stepped down to Vicodin, but was having some visual hallucinations and discontinued it.  Good pain control just with over-the-counter Tylenol.    Objective: The incision is intact without gapping or drainage. Alignment of the foot is good. Minimal edema or erythema noted.

## 2021-06-10 NOTE — PROGRESS NOTES
"Lissa Lucero is a 70 y.o. female who is being evaluated via a billable video visit.      The patient has been notified of following:     \"This video visit will be conducted via a call between you and your physician/provider. We have found that certain health care needs can be provided without the need for an in-person physical exam.  This service lets us provide the care you need with a video conversation.  If a prescription is necessary we can send it directly to your pharmacy.  If lab work is needed we can place an order for that and you can then stop by our lab to have the test done at a later time.    Video visits are billed at different rates depending on your insurance coverage. Please reach out to your insurance provider with any questions.    If during the course of the call the physician/provider feels a video visit is not appropriate, you will not be charged for this service.\"    Patient has given verbal consent to a Video visit? Yes  How would you like to obtain your AVS? AVS Preference: MyChart.  If dropped by the video visit, the video invitation should be sent to: Text to cell phone: [phone].  Will anyone else be joining your video visit? No        Video Start Time: 1620    Patient presents today with a constellation of symptoms including right-sided maxillary sinus pressure, continued issues with her postconcussive headache, more frequent stooling, and issues with her duloxetine.    Regarding the persistent headache, this is been evaluated by PCP in the past.  Referral to the concussion clinic was placed on 8/10/2020.  Still waiting to get set up with that.  No neurological changes.  There was some concerns about cognitive changes as well and concussion clinic is going to meet the investigation as to what further follow-up is needed.  Metoprolol was also stopped.  Blood pressure has been checked daily.  She is running systolically 120s-140s.  No chest pain, palpitations, lightheadedness, " "dizziness.  Heart rate 70-80s.    Patient also recently placed on duloxetine by her PCP for management of pain and mood.  There was some issues in regards to her insurance allowing her to take it twice a day.  She expresses concerns about becoming \"addicted \"to it.  She does not want to take medication long-term.  She does feel like it is helping her mood.  No major side effects.    Patient also notes some sinus congestion along the right side.  No pain.  No pain in the molars.  No purulent discharge.  She had a virtual visit with Dr. Tan and was given a prescription for azithromycin.  There was some improvement in her symptoms.  After stopping this, she unfortunately started to develop more frequent stooling.  No diffuse diarrhea.  It is not significantly malodorous.  No personal history of C. difficile.  She will get mild intermittent cramping.  No major dietary changes.  Afebrile without chills.    Additional provider notes: GENERAL: Healthy, alert and no distress  EYES: Eyes grossly normal to inspection. No discharge or erythema, or obvious scleral/conjunctival abnormalities.  RESP: No audible wheeze, cough, or visible cyanosis.  No visible retractions or increased work of breathing.    NEURO: Cranial nerves grossly intact. Mentation and speech appropriate for age.  PSYCH: Mentation appears normal, affect normal/bright, judgement and insight intact, normal speech and appearance well-groomed  1. Sinus pressure     2. Post-concussion headache  DULoxetine (CYMBALTA) 20 MG capsule   3. Adjustment disorder with depressed mood  DULoxetine (CYMBALTA) 20 MG capsule   4. Frequent stools       Regarding the sinus pressure, try over-the-counter fluticasone nasal spray.  Attempt nasal irrigation. No significant signs of sinus infection.     Discussed with the patient that addiction fears with duloxetine is not the same as addiction to other things like opiates or benzodiazepines.  She does note improvement in her " mood at 20 mg daily.  She would feel more comfortable just taking that for now.  If dose needs to be adjusted, she will reach out to PCP for this.      Frequent stooling could be a side effect since it seems to have started after initiation of azithromycin.  Given no diarrhea, lower suspicion for C. difficile.  Try probiotics since she cannot eat yogurt.  If symptoms persist or worsen, can always get stool cultures.    Video-Visit Details    Type of service:  Video Visit    Video End Time (time video stopped): 9229  Originating Location (pt. Location): Home    Distant Location (provider location):  [facility]/OB     Platform used for Video Visit: Osmany Brannon, CNP

## 2021-06-10 NOTE — ANESTHESIA CARE TRANSFER NOTE
Last vitals:   Vitals:    05/19/17 1220   BP: 107/55   Pulse: 68   Resp: 20   Temp: 36.6  C (97.9  F)   SpO2: 99%     Patient's level of consciousness is Drowsy but alert  Spontaneous respirations: yes  Maintains airway independently: yes  Dentition unchanged: yes  Oropharynx: oropharynx clear of all foreign objects    QCDR Measures:  ASA# 20 - Surgical Safety Checklist: ASA20A - Safety Checks Done  PQRS# 430 - Adult PONV Prevention: 4558F - Pt received => 2 anti-emetic agents (different classes) preop & intraop  ASA# 8 - Peds PONV Prevention: NA - Not pediatric patient, not GA or 2 or more risk factors NOT present  PQRS# 424 - Sarai-op Temp Management: 4559F - At least one body temp DOCUMENTED => 35.5C or 95.9F within required timeframe  PQRS# 426 - PACU Transfer Protocol: - Transfer of care checklist used  ASA# 14 - Acute Post-op Pain: ASA14B - Patient did NOT experience pain >= 7 out of 10    I completed my SBAR handoff to the receiving nurse per policy and procedure.

## 2021-06-10 NOTE — PROGRESS NOTES
ASSESSMENT/PLAN:       1. Post-concussion headache     - Ambulatory referral to [facility]cussion Clinic    2. Cognitive change  Because of the continued headaches and also cognitive concerns with change evidence since her fall Karissa 3, 2028 like an evaluation by the concussion clinic.  Will use their discretion to see if they feels a cognitive evaluation is indicated and also to give her some advice on how she can help her cognition.    3. Weight loss     - HM1(CBC and Differential), reviewed and normal  We will need to follow her weight and see if she continues to lose.  If she does we will need to investigate further for causes.    4. Benign Essential Hypertension  We will have her stop the metoprolol and suggested that she get a home blood pressure kit such as an Omron with a regular adult cuff.  Keep track of blood pressure and pulse.    5. Sinus bradycardia  By stopping the metoprolol hopefully we will see improvement in her pulse.      25 minutes spent total face-to-face with the patient with greater than 50% of my time being spent in counseling in regard to her above problems    Devon Meza MD      PROGRESS NOTE   8/11/2020    SUBJECTIVE:  Lissa Lucero is a 70 y.o. female  who presents for   Chief Complaint   Patient presents with     OTHER     Discuss bood pressure medication, concussion is still present, is concerned about how she fell and wondering if someting is causing her to drop that fast      The patient is here today to discuss 2 episodes that happened in June 2020 where she fell.  One was on Karissa 3 and the other June 12.  Both times it was reported that she tripped but in retrospect questions if that is what really happened or if she possibly had a brief episode of loss of consciousness.  On Karissa 3 she fell forward while she was out on a walk had her ear buds in listening to a pod cast.  She was unable to break her fall and landed flat on her face.  She sustained a fracture to her nose and also  some subluxation of the temporomandibular joint.  These 2 problems have resolved.  The second fall did seem like it was more mechanical and that she tripped on her foot and sustained a contusion to the foot.  Residual concerns are listed below.    1. Post-concussion headache  The patient continues to have a dull frontal headache as well as neck pain related to her first fall.  She has stiffness in her neck.  During that visit she did have a CT of her face and also her head which confirmed the fracture and subluxation of the TMJ but no other significant intracranial abnormalities.  The patient has not had any additional spells where she has had a lapse in consciousness and seems to remember the incident clearly when she fell although questions why she was not able to break her fall by putting her hands out in front of her.    2. Cognitive change  Since the fall Karissa 3 the patient has had some fogginess to her thinking and some loss in confidence for her memory.  She is wondering if there are some mental exercises that she could do to try to regain some of her clarity of thinking.    3. Weight loss  Of note is that the patient over the last 6 months has had a 20 pound weight loss which she is surprised by.  She feels that her appetite is good and that her caloric intake is about the same.  She does not drink any alcohol or soda.  She had a negative Cologuard May 2020.  She has not had a CBC checked.  No evidence for diabetes.    4. Benign Essential Hypertension  The past 6 months the patient's blood pressures been on the low side other than when she went to the emergency room and her systolic blood pressure was elevated at that time.  She questions if she needs to be on the metoprolol.  She has been on this medicine for about 5 years and she thinks it was started and I tried to corroborate from review of her medical record that it was started because she was having some anxiety and withdrawal symptoms when going off  of the medication and although this caused her blood pressure to be higher.  In January 2017 the patient did have a coronary angiogram which showed a 30% lesion in the right coronary artery and ejection fraction of 55%    5. Sinus bradycardia  The patient recently has had a pulse that typically is in the 50s.  She does not have a home blood pressure unit available to use.    Patient Active Problem List   Diagnosis     Osteopenia     Localized Primary Osteoarthritis Of The Carpometacarpal Joint Of The Right Thumb     Lower Back Pain     Hypercholesterolemia     Glossopharyngeal Neuralgia     Cataract     Benign Essential Hypertension     Psoriasis     Esophageal Reflux     Migraine Headache     Peripheral Neuropathy     Postmenopausal Atrophic Vaginitis     Anxiety disorder     Hypothyroid     Hyperglycemia     Chronic back pain     IBS (irritable bowel syndrome)     Abnormal stress test     Coronary artery disease involving native coronary artery of native heart with angina pectoris (H)     Arthritis of midfoot     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       doxycycline (MONODOX) 50 MG capsule        levothyroxine (SYNTHROID, LEVOTHROID) 50 MCG tablet TAKE ONE AND ONE-HALF TABLETS BY MOUTH EVERY  tablet 1     metoprolol tartrate (LOPRESSOR) 25 MG tablet Take 1 tablet (25 mg total) by mouth at bedtime. 90 tablet 2     multivit-mineral-iron-lutein (CENTRUM SILVER ULTRA WOMEN'S) Tab Take 1 tablet by mouth daily.        NIFEdipine (PROCARDIA XL) 30 MG 24 hr tablet TAKE ONE TABLET BY MOUTH EVERY DAY 90 tablet 3     RESTASIS 0.05 % ophthalmic emulsion        simvastatin (ZOCOR) 20 MG tablet TAKE ONE TABLET BY MOUTH AT BEDTIME 90 tablet 1     No current facility-administered medications for this visit.        Social History     Tobacco Use    Smoking Status Never Smoker   Smokeless Tobacco Never Used   Tobacco Comment    No exposure           OBJECTIVE:        Recent Results (from the past 240 hour(s))   HM1 (CBC with Diff)   Result Value Ref Range    WBC 5.0 4.0 - 11.0 thou/uL    RBC 4.22 3.80 - 5.40 mill/uL    Hemoglobin 14.0 12.0 - 16.0 g/dL    Hematocrit 39.6 35.0 - 47.0 %    MCV 94 80 - 100 fL    MCH 33.1 27.0 - 34.0 pg    MCHC 35.3 32.0 - 36.0 g/dL    RDW 12.2 11.0 - 14.5 %    Platelets 268 140 - 440 thou/uL    MPV 8.2 7.0 - 10.0 fL    Neutrophils % 56 50 - 70 %    Lymphocytes % 34 20 - 40 %    Monocytes % 7 2 - 10 %    Eosinophils % 2 0 - 6 %    Basophils % 0 0 - 2 %    Neutrophils Absolute 2.8 2.0 - 7.7 thou/uL    Lymphocytes Absolute 1.7 0.8 - 4.4 thou/uL    Monocytes Absolute 0.4 0.0 - 0.9 thou/uL    Eosinophils Absolute 0.1 0.0 - 0.4 thou/uL    Basophils Absolute 0.0 0.0 - 0.2 thou/uL       Vitals:    08/10/20 1132   BP: 101/62   Pulse: (!) 54   SpO2: 98%   Weight: 132 lb (59.9 kg)     Weight: 132 lb (59.9 kg)          Physical Exam:  GENERAL APPEARANCE: 70-year-old female, NAD, well hydrated, well nourished  SKIN:  Normal skin turgor, no lesions/rashes   Mental status exam: The patient is casually dressed and well-groomed with good eye contact and normal speech pattern.  Thought process reveals a good depth to her concerns and goals.  Her affect reveals a good range with no delusional thinking paranoia or suicidal thoughts.  She demonstrates no psychomotor agitation or depression.  EXTREMITY: no edema and full ROM of all joints  NEURO: no focal findings

## 2021-06-11 NOTE — PROGRESS NOTES
Assessment: 13 days status post right first metatarsal cuneiform joint fusion.    Plan: Sutures removed today. X-rays are reassuring.  She will advance to weightbearing in the postoperative boot as pain allows.  Return to clinic in 2 weeks to reassess.      Subjective: The patient returns to the [facility] for a second postoperative visit. Surgery was 13 days ago on May 19, 2017 at [facility].  She is frustrated with the hassle of nonweightbearing, but still doing it.  Not using pain medication.    Objective: The incision is intact without gapping or drainage. Alignment of the foot is good. Minimal edema or erythema noted.  X-ray today shows intact hardware with progress toward fusion of the first metatarsal cuneiform joint.
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
## 2021-06-11 NOTE — PROGRESS NOTES
Assessment: Six weeks status post right first metatarsal cuneiform joint fusion.    Plan: She is making excellent functional progress.  X-ray looks okay.  Advance activities and shoes as pain allows.  Return to clinic as needed.      Subjective: The patient returns to the [facility] for a fourth postoperative visit. Surgery was six weeks ago on May 19, 2017 at . Not using pain medication.  She is ambulating well in the postoperative boot.  She has also been wearing a regular shoe, tolerating that well.    Objective: The incision is intact without gapping or drainage. Alignment of the foot is good. Minimal edema or erythema noted.  X-ray shows good bony apposition.  Joint line still visible on the lateral view.

## 2021-06-11 NOTE — PROGRESS NOTES
Assessment: Four weeks status post right first metatarsal cuneiform joint fusion.    Plan: So far, she is doing very well.  Return to clinic in 2 weeks to repeat the x-ray.  She will try weightbearing at home without the boot.  Advance those activities in a regular shoe as pain allows.    Subjective: The patient returns to the [facility] for a third postoperative visit. Surgery was one month ago on May 19, 2017 at [facility]. Not using pain medication.  She is ambulating well in the postoperative boot.  She was able to participate in a  this week.  Minimal discomfort from the foot.  She was on her feet a different day for about 8 hours and had some swelling around the ankle, but it resolved within a day.    Objective: The incision is intact without gapping or drainage. Alignment of the foot is good. Minimal edema or erythema noted.
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
## 2021-06-11 NOTE — PATIENT INSTRUCTIONS - HE
Present to the emergency room for concerns for an infected joint (also known as septic arthritis). You may need the joint fluid drained and tested as well as IV antibiotics to treat the infection.
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
## 2021-06-12 NOTE — TELEPHONE ENCOUNTER
Refill Approved    Rx renewed per Medication Renewal Policy. Medication was last renewed on 4/28/20.    Mariah Hopkins, [facility] Triage/Med Refill 11/3/2020     Requested Prescriptions   Pending Prescriptions Disp Refills     levothyroxine (SYNTHROID, LEVOTHROID) 50 MCG tablet [Pharmacy Med Name: LEVOTHYROXINE SODIUM 50MCG TABS] 135 tablet 1     Sig: TAKE ONE AND ONE-HALF TABLETS BY MOUTH EVER DAY       Thyroid Hormones Protocol Passed - 10/30/2020  8:22 AM        Passed - Provider visit in past 12 months or next 3 months     Last office visit with prescriber/PCP: 7/16/2020 Vee Tan MD OR same dept: 9/11/2020 Devon Meza MD OR same specialty: 9/11/2020 Devon Meza MD  Last physical: Visit date not found Last MTM visit: Visit date not found   Next visit within 3 mo: Visit date not found  Next physical within 3 mo: Visit date not found  Prescriber OR PCP: Vee Tan MD  Last diagnosis associated with med order: 1. Hypothyroidism, unspecified type  - levothyroxine (SYNTHROID, LEVOTHROID) 50 MCG tablet [Pharmacy Med Name: LEVOTHYROXINE SODIUM 50MCG TABS]; TAKE ONE AND ONE-HALF TABLETS BY MOUTH EVER DAY  Dispense: 135 tablet; Refill: 1    If protocol passes may refill for 12 months if within 3 months of last provider visit (or a total of 15 months).             Passed - TSH on file in past 12 months for patient age 12 & older     TSH   Date Value Ref Range Status   04/28/2020 1.19 0.30 - 5.00 uIU/mL Final

## 2021-06-12 NOTE — PROGRESS NOTES
"DIAGNOSIS: Almost 4 months status post right Lapidus bunionectomy; floating great toe    PLAN: Padding was placed in the shoe to \"bring the ground up to the toe\".  She felt that more comfortable immediately.  I would expect range of motion in the great toe to improve as she continues to walk on it.  Return to clinic if discomfort persists.  Fifteen minute visit, greater than half our time spent counseling and coordinating medical care.     SUBJECTIVE: The patient returns to the [facility] for follow up of her right Lapidus bunionectomy done by me on May 19, almost 4 months ago.  She denies pain, but has some discomfort around the second toe, and the great toe does not touch the ground.  Some instability with that.  The great toe is been shortened because of the surgical procedure, causing more irritation of the second toe in the shoe.  She denies any new injury.  With new shoes, she can walk for about 4 hours before discomfort starts.  She denies pain back at the fusion site at the first metatarsal cuneiform joint.    PHYSICAL EXAM:  /72  Pulse 74  Ht 5' 4\" (1.626 m)  Wt 146 lb (66.2 kg)  SpO2 99%  BMI 25.06 kg/m2  General: Pleasant 67 y.o. female in no acute distress.  Vascular: DP pulses are diminished. PT pulses are diminished. Pedal hair is diminished. Feet are warm to the touch.  Cardiac: Pulse is regular.  Lymphatic: No edema at the ankles.  Neuro: Sensation in the feet is altered. Patient describes paresthesias.  Derm: Surgical scar has healed nicely.  Right second toenail is thickened and dystrophic.  Musculoskeletal: Solid fusion at the first metatarsal cuneiform joint.  Great toe is shorter than the contralateral great toe, and the right second toe is about a centimeter longer than the great toenail.  With weightbearing, the great toe does not purchase the ground.       "

## 2021-06-12 NOTE — TELEPHONE ENCOUNTER
Refill Approved    Rx renewed per Medication Renewal Policy. Medication was last renewed on 4/28/20.    Mariah Hopkins, [facility] Triage/Med Refill 11/9/2020     Requested Prescriptions   Pending Prescriptions Disp Refills     simvastatin (ZOCOR) 20 MG tablet [Pharmacy Med Name: SIMVASTATIN 20MG TABS] 90 tablet 1     Sig: TAKE ONE TABLET BY MOUTH AT BEDTIME       Statins Refill Protocol (Hmg CoA Reductase Inhibitors) Passed - 11/6/2020  8:19 AM        Passed - PCP or prescribing provider visit in past 12 months      Last office visit with prescriber/PCP: 7/16/2020 Vee Tan MD OR same dept: 9/11/2020 Devon Meza MD OR same specialty: 9/11/2020 Devon Meza MD  Last physical: Visit date not found Last MTM visit: Visit date not found   Next visit within 3 mo: Visit date not found  Next physical within 3 mo: Visit date not found  Prescriber OR PCP: Vee Tan MD  Last diagnosis associated with med order: 1. Hypercholesterolemia  - simvastatin (ZOCOR) 20 MG tablet [Pharmacy Med Name: SIMVASTATIN 20MG TABS]; TAKE ONE TABLET BY MOUTH AT BEDTIME  Dispense: 90 tablet; Refill: 1    If protocol passes may refill for 12 months if within 3 months of last provider visit (or a total of 15 months).

## 2021-06-13 NOTE — PROGRESS NOTES
PROGRESS NOTE       SUBJECTIVE:  Lissa Lucero is a 67 y.o. female   Chief Complaint   Patient presents with     Medication Refill     med check      Foot Swelling     follow up foot surgery in May, pain is getting worse, balance is off,      Flu Vaccine   Patient is here today for medication recheck.  She states that her neuropathy is worse in both her feet.  The bottom of her feet tingle and they are numb.  Sometimes it goes up on top of her right foot.  She is also discovered that she has fibromas on the bottom of her feet which affect the arch.  She had one resected but it recurred.  The next option is radiation therapy.  We talked about possible alternative treatments such as acupuncture.  She is receptive to this and will give it some consideration.  She has been doing gentle shemar chi and finds that helpful.  She would like her thyroid checked and a flu shot.  Her stomach has been real good and because of this I recommended she try taking the Protonix every other day.      Patient Active Problem List   Diagnosis     Memory Lapses Or Loss     Osteopenia     Localized Primary Osteoarthritis Of The Carpometacarpal Joint Of The Right Thumb     Lower Back Pain     Hypercholesterolemia     Glossopharyngeal Neuralgia     Cataract     Benign Essential Hypertension     Psoriasis     Esophageal Reflux     Migraine Headache     Peripheral Neuropathy     Postmenopausal Atrophic Vaginitis     Anxiety disorder     Hypothyroid     Hyperglycemia     Foot pain, right     Chest pain     Chronic back pain     IBS (irritable bowel syndrome)     Abnormal stress test     Coronary artery disease involving native coronary artery of native heart with angina pectoris     Arthritis of midfoot       Current Outpatient Prescriptions   Medication Sig Dispense Refill     aspirin 81 MG EC tablet Take 81 mg by mouth daily.       carboxymethylcellulose (REFRESH PLUS) 0.5 % Dpet ophthalmic dropperette Administer 1 drop to both eyes 4 (four)  times a day as needed.       cycloSPORINE (RESTASIS) 0.05 % ophthalmic emulsion Administer 1 drop to both eyes 2 (two) times a day.       cycloSPORINE (RESTASIS) 0.05 % ophthalmic emulsion PLACE 1 DROP IN BOTH EYES EVERY 12 HOURS       gabapentin (NEURONTIN) 600 MG tablet Take 1,200 mg by mouth 3 (three) times a day.       levothyroxine (SYNTHROID, LEVOTHROID) 100 MCG tablet TAKE 1 TABLET BY MOUTH DAILY BEFORE BREAKFAST 90 tablet 3     lidocaine (XYLOCAINE) 5 % ointment Apply 1 application topically daily as needed. Apply to painful midfoot daily as needed for pain       metoprolol tartrate (LOPRESSOR) 25 MG tablet Take 25 mg by mouth at bedtime.       multivit-mineral-iron-lutein (CENTRUM SILVER ULTRA WOMEN'S) Tab Take 1 tablet by mouth daily.        NIFEdipine (PROCARDIA XL) 30 MG 24 hr tablet Take 30 mg by mouth Daily before breakfast.       ondansetron (ZOFRAN) 4 MG tablet Take 4 mg by mouth every 8 (eight) hours as needed for nausea.       pantoprazole (PROTONIX) 40 MG tablet TAKE 1 TABLET BY MOUTH DAILY 90 tablet 0     ranitidine (ZANTAC) 150 MG tablet Take 150 mg by mouth every morning.       simvastatin (ZOCOR) 20 MG tablet Take 1 tablet (20 mg total) by mouth at bedtime. 90 tablet 3     doxycycline (DORYX) 100 MG EC tablet Take 100 mg by mouth daily.       estropipate (OGEN) 0.75 MG tablet Take 0.75 mg by mouth daily.       No current facility-administered medications for this visit.        History   Smoking Status     Never Smoker   Smokeless Tobacco     Never Used     Comment: No exposure       REVIEW OF SYSTEMS:  Patient denies fever, chills, dizziness, headache, visual change, cough, chest pain, shortness of breath, abdominal pain, edema.           OBJECTIVE:       Vitals:    10/02/17 1004   BP: 122/72   Pulse: 72     Weight: 149 lb (67.6 kg)  Wt Readings from Last 3 Encounters:   10/02/17 149 lb (67.6 kg)   09/07/17 146 lb (66.2 kg)   05/18/17 146 lb (66.2 kg)     Body mass index is 25.58  kg/(m^2).        Physical Exam:  GENERAL APPEARANCE: A&A, NAD, well hydrated, well nourished.  Senia is relaxed today with a social smile.  She appears healthy.  SKIN:  Normal skin turgor, no lesions/rashes   EARS: TM's normal, gray with nl light reflex  OROPHARYNX: without erythema, no post nasal drainage or thrush  NECK: Supple, without lymphadenopathy, no thyroid mass  CV: RRR, no M/G/R   LUNGS: CTAB, normal respiratory effort  EXTREMITY: Palpable fibroma on the bottom of each of her feet.  NEURO: no gross deficits   PSYCHIATRIC:  Mood appropriate, memory intact        ASSESSMENT/PLAN:     1. Screen for colon cancer    - Ambulatory referral for Colonoscopy    2. Hypercholesteremia    - Lipid Cascade    3. Health care maintenance    - Influenza High Dose, Seasonal 65+ yrs    4. Hypothyroidism    - Thyroid Stimulating Hormone (TSH)    5. Encounter for screening mammogram for breast cancer    - Mammo Screening Bilateral; Future        There are no discontinued medications.  Return in about 6 months (around 4/2/2018) for medication check.  Senia appears to be doing very well off her chronic pain meds.  We will continue everything else the same and follow-up in 6 months.    The visit lasted a total of 30 minutes face to face with the patient.  Over 50% of the time spent counseling and educating the patient about all of the above.      Linsey Gutiérrez MD

## 2021-06-14 NOTE — PROGRESS NOTES
PROGRESS NOTE       SUBJECTIVE:  Lissa Lucero is a 67 y.o. female   Chief Complaint   Patient presents with     Medication Refill     med check and refills      Foot Pain     on going for awhile, is worse when wearing shoes. has seen Dr morel    Patient is here for medication review and refills.  Physically she is feeling well and feels as though everything is on track.  Mentally, she is struggling.  She has been working for 15 months with a therapist  at a place called [facility].  Desiree Chamorro PsyD, CAROLYNN Lissa is attending a class with 12 people in the group.  Initially it was off to a slow start but has really picked up now.  She is learning a lot and would like to be able to apply it to her relationship with her children.  But this is a challenge.  We talked about this.  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      Patient had foot surgery a year ago in May.  Her big toe on the right foot will not lie flat.  This affects her balance and she is unable to do yoga and shemar chi.  Dr. Morel has recommended she give it another 6 months.  She has been working at but it is not improving.    She has been sleeping well usually 6 hours at night.    Patient takes the cyclobenzaprine once a day if needed.  We backed off her thyroid medication to 88 mcg daily.  This will need to be checked on a yearly basis.  She takes both metoprolol tartrate 25 mg at bedtime and nifedipine XL 30 mg daily for blood pressure.  She is weaning off pantoprazole and currently is taking it about once every third day.  Zantac 150 works well in the morning.    She is changing her pharmacy to [facility] in [place].    Chronic pain has not been much of an issue.  She is now off all her narcotic medications.    Care Team            Linsey Gutiérrez MD PCP - General    [phone]     Desiree Wyatt PsyD, LP Psychology    [phone]     Maddi Patel MD  Cardiology    Kenny Saxena MD Podiatry    Royce Osei MD Dermatology            Patient Active Problem List   Diagnosis     Memory Lapses Or Loss     Osteopenia     Localized Primary Osteoarthritis Of The Carpometacarpal Joint Of The Right Thumb     Lower Back Pain     Hypercholesterolemia     Glossopharyngeal Neuralgia     Cataract     Benign Essential Hypertension     Psoriasis     Esophageal Reflux     Migraine Headache     Peripheral Neuropathy     Postmenopausal Atrophic Vaginitis     Anxiety disorder     Hypothyroid     Hyperglycemia     Foot pain, right     Chest pain     Chronic back pain     IBS (irritable bowel syndrome)     Abnormal stress test     Coronary artery disease involving native coronary artery of native heart with angina pectoris     Arthritis of midfoot       Current Outpatient Prescriptions   Medication Sig Dispense Refill     aspirin 81 MG EC tablet Take 81 mg by mouth daily.       cyclobenzaprine (FLEXERIL) 10 MG tablet TAKE 1/2 TO 1 TABLET BY MOUTH UP TO THREE TIMES DAILY AS NEEDED FOR MUSCLE SPASM PAIN 30 tablet 0     cycloSPORINE (RESTASIS) 0.05 % ophthalmic emulsion Administer 1 drop to both eyes 2 (two) times a day.       doxycycline (DORYX) 100 MG EC tablet Take 100 mg by mouth daily.       gabapentin (NEURONTIN) 600 MG tablet TAKE 2 TABLETS BY MOUTH THREE TIMES DAILY 180 tablet 0     levothyroxine (SYNTHROID) 88 MCG tablet Take 1 tablet (88 mcg total) by mouth daily. 90 tablet 3     lidocaine (XYLOCAINE) 5 % ointment Apply 1 application topically daily as needed. Apply to painful midfoot daily as needed for pain       metoprolol tartrate (LOPRESSOR) 25 MG tablet Take 25 mg by mouth at bedtime.       multivit-mineral-iron-lutein (CENTRUM SILVER ULTRA WOMEN'S) Tab Take 1 tablet by mouth daily.        NIFEdipine (PROCARDIA XL) 30 MG 24 hr tablet Take 30 mg by mouth Daily before breakfast.       ondansetron (ZOFRAN) 4 MG tablet Take 4 mg by mouth every 8 (eight) hours as needed for  nausea.       pantoprazole (PROTONIX) 40 MG tablet TAKE 1 TABLET BY MOUTH DAILY 90 tablet 0     ranitidine (ZANTAC) 150 MG tablet Take 150 mg by mouth every morning.       simvastatin (ZOCOR) 20 MG tablet Take 1 tablet (20 mg total) by mouth at bedtime. 90 tablet 3     carboxymethylcellulose (REFRESH PLUS) 0.5 % Dpet ophthalmic dropperette Administer 1 drop to both eyes 4 (four) times a day as needed.       cycloSPORINE (RESTASIS) 0.05 % ophthalmic emulsion PLACE 1 DROP IN BOTH EYES EVERY 12 HOURS       doxycycline (MONODOX) 50 MG capsule   3     estropipate (OGEN) 0.75 MG tablet Take 0.75 mg by mouth daily.       gabapentin (NEURONTIN) 600 MG tablet Take 1,200 mg by mouth 3 (three) times a day.       levothyroxine (SYNTHROID, LEVOTHROID) 100 MCG tablet TAKE 1 TABLET BY MOUTH DAILY BEFORE BREAKFAST 90 tablet 3     No current facility-administered medications for this visit.        History   Smoking Status     Never Smoker   Smokeless Tobacco     Never Used     Comment: No exposure       REVIEW OF SYSTEMS:  Patient denies fever, chills, dizziness, headache, visual change, cough, chest pain, shortness of breath, abdominal pain, extremity pain or swelling.          OBJECTIVE:       Vitals:    11/22/17 1522   BP: 118/78   Pulse: 73   SpO2: 97%     Weight: 148 lb (67.1 kg)  Wt Readings from Last 3 Encounters:   11/22/17 148 lb (67.1 kg)   10/02/17 149 lb (67.6 kg)   09/07/17 146 lb (66.2 kg)     Body mass index is 25.4 kg/(m^2).        Physical Exam:  GENERAL APPEARANCE: A&A, NAD, well hydrated, well nourished  SKIN:  Normal skin turgor, no lesions/rashes   NECK: Supple, without lymphadenopathy, no thyroid mass  CV: RRR, no M/G/R   LUNGS: CTAB, normal respiratory effort  EXTREMITY: Extremities normal, atraumatic, no swelling.  Right big toe does not lie flat.  NEURO: no gross deficits   PSYCHIATRIC:  Mood appropriate, memory intact        ASSESSMENT/PLAN:     1. Hypercholesterolemia  Continue simvastatin 20 mg daily.   Patient also takes 81 mg aspirin daily.    2. Benign Essential Hypertension  Continue metoprolol 25 mg at bedtime and nifedipine XL 30 mg every morning.    3. Peripheral Neuropathy  1200 mg gabapentin 3 times a day    4. Hypothyroid  88 mcg levothyroxine daily.    5. Foot pain, right  Patient will keep working on straightening the toe.  And she will follow-up with Dr. Saxena in 6 months.      Medications Discontinued During This Encounter   Medication Reason     carboxymethylcellulose (REFRESH PLUS) 0.5 % Dpet ophthalmic dropperette Therapy completed     cycloSPORINE (RESTASIS) 0.05 % ophthalmic emulsion Duplicate order     estropipate (OGEN) 0.75 MG tablet Therapy completed     gabapentin (NEURONTIN) 600 MG tablet Duplicate order     levothyroxine (SYNTHROID, LEVOTHROID) 100 MCG tablet      lidocaine (XYLOCAINE) 5 % ointment Therapy completed     Follow up in 3-6 months    The visit lasted a total of 27 minutes face to face with the patient.  Over 50% of the time spent counseling and educating the patient about all of the above.      Linsey Gutiérrez MD

## 2021-06-15 ENCOUNTER — APPOINTMENT (OUTPATIENT)
Dept: URBAN - METROPOLITAN AREA CLINIC 260 | Age: 71
Setting detail: DERMATOLOGY
End: 2021-06-16

## 2021-06-15 VITALS — WEIGHT: 118 LBS | HEIGHT: 62 IN | RESPIRATION RATE: 15 BRPM

## 2021-06-15 DIAGNOSIS — D22 MELANOCYTIC NEVI: ICD-10-CM

## 2021-06-15 DIAGNOSIS — L82.1 OTHER SEBORRHEIC KERATOSIS: ICD-10-CM

## 2021-06-15 DIAGNOSIS — L57.0 ACTINIC KERATOSIS: ICD-10-CM

## 2021-06-15 DIAGNOSIS — D49.2 NEOPLASM OF UNSPECIFIED BEHAVIOR OF BONE, SOFT TISSUE, AND SKIN: ICD-10-CM

## 2021-06-15 DIAGNOSIS — L81.4 OTHER MELANIN HYPERPIGMENTATION: ICD-10-CM

## 2021-06-15 PROBLEM — D22.39 MELANOCYTIC NEVI OF OTHER PARTS OF FACE: Status: ACTIVE | Noted: 2021-06-15

## 2021-06-15 PROCEDURE — OTHER ADDITIONAL NOTES: OTHER

## 2021-06-15 PROCEDURE — OTHER LIQUID NITROGEN: OTHER

## 2021-06-15 PROCEDURE — OTHER COUNSELING: OTHER

## 2021-06-15 PROCEDURE — OTHER EDUCATIONAL RESOURCES PROVIDED: OTHER

## 2021-06-15 PROCEDURE — 17000 DESTRUCT PREMALG LESION: CPT

## 2021-06-15 PROCEDURE — 17003 DESTRUCT PREMALG LES 2-14: CPT

## 2021-06-15 PROCEDURE — 99203 OFFICE O/P NEW LOW 30 MIN: CPT | Mod: 25

## 2021-06-15 PROCEDURE — OTHER DEFER: OTHER

## 2021-06-15 ASSESSMENT — LOCATION DETAILED DESCRIPTION DERM
LOCATION DETAILED: LEFT INFERIOR CENTRAL MALAR CHEEK
LOCATION DETAILED: RIGHT INFERIOR LATERAL FOREHEAD
LOCATION DETAILED: RIGHT INFERIOR CENTRAL MALAR CHEEK
LOCATION DETAILED: RIGHT UPPER CUTANEOUS LIP
LOCATION DETAILED: LEFT MEDIAL MALAR CHEEK
LOCATION DETAILED: LEFT UPPER CUTANEOUS LIP
LOCATION DETAILED: LEFT INFERIOR MEDIAL FOREHEAD
LOCATION DETAILED: RIGHT INFERIOR LATERAL MALAR CHEEK
LOCATION DETAILED: GLABELLA

## 2021-06-15 ASSESSMENT — LOCATION SIMPLE DESCRIPTION DERM
LOCATION SIMPLE: RIGHT FOREHEAD
LOCATION SIMPLE: RIGHT LIP
LOCATION SIMPLE: LEFT FOREHEAD
LOCATION SIMPLE: GLABELLA
LOCATION SIMPLE: LEFT LIP
LOCATION SIMPLE: LEFT CHEEK
LOCATION SIMPLE: RIGHT CHEEK

## 2021-06-15 ASSESSMENT — LOCATION ZONE DERM
LOCATION ZONE: LIP
LOCATION ZONE: FACE

## 2021-06-15 NOTE — PROCEDURE: LIQUID NITROGEN
Consent: The patient's consent was obtained including but not limited to risks of crusting, scabbing, blistering, scarring, darker or lighter pigmentary change, recurrence, incomplete removal and infection.
Number Of Freeze-Thaw Cycles: 5 freeze-thaw cycles
Render Post-Care Instructions In Note?: yes
Render Note In Bullet Format When Appropriate: No
Detail Level: Simple
Duration Of Freeze Thaw-Cycle (Seconds): 3
Post-Care Instructions: I reviewed with the patient in detail post-care instructions. Patient is to wear sunprotection, and avoid picking at any of the treated lesions. Pt may apply Vaseline to crusted or scabbing areas.

## 2021-06-15 NOTE — PROCEDURE: ADDITIONAL NOTES
Detail Level: Detailed
Render Risk Assessment In Note?: no
Additional Notes: Pictures taken taken today will keep monitoring spot
Additional Notes: She has two events coming up so will schedule a separate time to have the remaining treated

## 2021-06-16 NOTE — TELEPHONE ENCOUNTER
Telephone Encounter by Sasha Flores at 2/9/2019 11:21 AM     Author: Sasha Flores Service: -- Author Type: --    Filed: 2/9/2019 11:22 AM Encounter Date: 2/6/2019 Status: Signed    : Sasha Flores       PA INITIATION

## 2021-06-16 NOTE — TELEPHONE ENCOUNTER
Telephone Encounter by Yue Corado at 5/17/2019  3:01 PM     Author: Yue Corado Service: -- Author Type: --    Filed: 5/17/2019  3:02 PM Encounter Date: 5/15/2019 Status: Signed    : Yue Corado APPROVED:    Approval start date: 1/1/19  Approval end date:12/31/19    Pharmacy has been notified of approval and will contact patient when medication is ready for pickup.

## 2021-06-17 NOTE — PROGRESS NOTES
PROGRESS NOTE       SUBJECTIVE:  Lissa Lucero is a 68 y.o. female   Chief Complaint   Patient presents with     Follow-up     pt was inpat at [facility] 5-3-18 ,5-4-18, appendicitis      This 68-year-old patient woke up at 5:40 AM with acute right-sided abdominal pain.  She knew right away she was in trouble.  She presented to [facility] and evaluation was consistent with acute appendicitis.  Dr. Millan surgically removed her appendix and she is very grateful for her care.  She felt everything went well.  Since it is only 6 days from her surgery I think she is doing very well.  She is eating fine.  She has her energy back.  She has no pain.  Just a little soreness where the incisions are.  She has no questions or concerns.    Patient Active Problem List   Diagnosis     Memory Lapses Or Loss     Osteopenia     Localized Primary Osteoarthritis Of The Carpometacarpal Joint Of The Right Thumb     Lower Back Pain     Hypercholesterolemia     Glossopharyngeal Neuralgia     Cataract     Benign Essential Hypertension     Psoriasis     Esophageal Reflux     Migraine Headache     Peripheral Neuropathy     Postmenopausal Atrophic Vaginitis     Anxiety disorder     Hypothyroid     Hyperglycemia     Foot pain, right     Chest pain     Chronic back pain     IBS (irritable bowel syndrome)     Abnormal stress test     Coronary artery disease involving native coronary artery of native heart with angina pectoris     Arthritis of midfoot     Appendicitis     Hypertension     Coronary artery disease     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       aspirin 81 MG EC tablet Take 81 mg by mouth daily.       cyclobenzaprine (FLEXERIL) 10 MG tablet Take 5-10 mg by mouth 3 (three) times a day as needed for muscle spasms.       cycloSPORINE (RESTASIS) 0.05 %  ophthalmic emulsion Administer 1 drop to both eyes 2 (two) times a day.       doxycycline (MONODOX) 50 MG capsule Take 50 mg by mouth daily.   3     gabapentin (NEURONTIN) 600 MG tablet Take 1,200 mg by mouth 3 (three) times a day.       levothyroxine (SYNTHROID) 88 MCG tablet Take 1 tablet (88 mcg total) by mouth daily. 90 tablet 3     metoprolol tartrate (LOPRESSOR) 25 MG tablet Take 1 tablet (25 mg total) by mouth at bedtime. 90 tablet 2     multivit-mineral-iron-lutein (CENTRUM SILVER ULTRA WOMEN'S) Tab Take 1 tablet by mouth daily.        NIFEdipine (PROCARDIA XL) 30 MG 24 hr tablet Take 1 tablet (30 mg total) by mouth daily. 90 tablet 0     ranitidine (ZANTAC) 150 MG tablet Take 150 mg by mouth every morning.       simvastatin (ZOCOR) 20 MG tablet TAKE ONE TABLET BY MOUTH AT BEDTIME 90 tablet 3     No current facility-administered medications for this visit.        History   Smoking Status     Never Smoker   Smokeless Tobacco     Never Used     Comment: No exposure       REVIEW OF SYSTEMS:  Patient denies fever, chills, dizziness, headache, visual change, cough, chest pain, shortness of breath, abdominal pain, extremity pain or swelling.    OBJECTIVE:       Vitals:    05/09/18 1518   BP: 138/88   Pulse: 92   Temp: 99.4  F (37.4  C)   SpO2: 97%     Weight: 145 lb (65.8 kg)  Wt Readings from Last 3 Encounters:   05/09/18 145 lb (65.8 kg)   05/03/18 145 lb 3.2 oz (65.9 kg)   04/16/18 145 lb (65.8 kg)     Body mass index is 24.89 kg/(m^2).        Physical Exam:  GENERAL APPEARANCE: A&A, NAD, well hydrated, well nourished  SKIN:  Normal skin turgor, no lesions/rashes   EARS: TM's normal, gray with nl light reflex  OROPHARYNX: without erythema, no post nasal drainage or thrush  NECK: Supple, without lymphadenopathy, no thyroid mass  CV: RRR, no M/G/R   LUNGS: CTAB, normal respiratory effort  ABDOMEN: S&NT, no masses, no organomegaly.  Her laparoscopic incisions are healing very nicely.  Steri-Strips remain in place.   There is no cellulitis or excessive tenderness.  EXTREMITY: Extremities normal, atraumatic, no swelling  NEURO: no gross deficits   PSYCHIATRIC:  Mood appropriate, memory intact        ASSESSMENT/PLAN:     1. Status post appendectomy, follow-up exam  Patient currently is doing very well.  I recommended she leave the Steri-Strips on for another 3-5 days.  And she may remove.  If she has any concerns regarding her incisions she will see Dr. Millan.    The visit lasted a total of 15 minutes face to face with the patient.  Over 50% of the time spent counseling and educating the patient about all of the above.      Linsey Gutiérrez MD

## 2021-06-17 NOTE — ANESTHESIA CARE TRANSFER NOTE
Last vitals:   Vitals:    05/03/18 2053   BP: (!) 124/91   Pulse: 100   Resp: 16   Temp: 36.2  C (97.2  F)   SpO2: 99%   In OR, spont respir, , NAVA, sustained head lift, sx and extubated to 02 via FM, spont respir, transported to PACU, VSS, report to RN.   Patient's level of consciousness is drowsy  Spontaneous respirations: yes  Maintains airway independently: yes  Dentition unchanged: yes  Oropharynx: oropharynx clear of all foreign objects    QCDR Measures:  ASA# 20 - Surgical Safety Checklist: WHO surgical safety checklist completed prior to induction  PQRS# 430 - Adult PONV Prevention: 4558F - Pt received => 2 anti-emetic agents (different classes) preop & intraop  ASA# 8 - Peds PONV Prevention: NA - Not pediatric patient, not GA or 2 or more risk factors NOT present  PQRS# 424 - Sarai-op Temp Management: 4559F - At least one body temp DOCUMENTED => 35.5C or 95.9F within required timeframe  PQRS# 426 - PACU Transfer Protocol: - Transfer of care checklist used  ASA# 14 - Acute Post-op Pain: ASA14B - Patient did NOT experience pain >= 7 out of 10

## 2021-06-17 NOTE — ANESTHESIA POSTPROCEDURE EVALUATION
Patient: Lissa Lucero  APPENDECTOMY, LAPAROSCOPIC  Anesthesia type: general    Patient location: PACU  Last vitals:   Vitals:    05/03/18 2220   BP: 159/77   Pulse: 72   Resp: 20   Temp: 36.8  C (98.3  F)   SpO2: 96%     Post vital signs: stable  Level of consciousness: awake and responds to simple questions  Post-anesthesia pain: pain controlled  Post-anesthesia nausea and vomiting: no  Pulmonary: unassisted, return to baseline  Cardiovascular: stable and blood pressure at baseline  Hydration: adequate  Anesthetic events: no    QCDR Measures:  ASA# 11 - Sarai-op Cardiac Arrest: ASA11B - Patient did NOT experience unanticipated cardiac arrest  ASA# 12 - Sarai-op Mortality Rate: ASA12B - Patient did NOT die  ASA# 13 - PACU Re-Intubation Rate: ASA13B - Patient did NOT require a new airway mgmt  ASA# 10 - Composite Anes Safety: ASA10A - No serious adverse event    Additional Notes:

## 2021-06-17 NOTE — PROGRESS NOTES
PROGRESS NOTE       SUBJECTIVE:  Lissa Lucero is a 68 y.o. female   Chief Complaint   Patient presents with     Follow-up     neuropathy , U Alicja Conn is here because she is working on getting better and has started [facility] for her peripheral neuropathy.  She is working on her balance because it has been bad.  She noted the difference when she was in [place].  She found it difficult to walk and deeper sand.  She needs a smooth firm surface otherwise her balance is off.    I reviewed her chart and found a formal test for neuropathy which was done August 22, 2008 showing bilateral peripheral neuropathy.  So it is interesting that it has been 10 years now.  We talked about how when you have neuropathy he do not get positional cues like he normally would.  So when she is walking she might want to try walking poles that [employer] use.  This would give her body additional positional sense and assist her balance.    Otherwise she has been enjoying life and feels as though she is doing well.      Patient Active Problem List   Diagnosis     Memory Lapses Or Loss     Osteopenia     Localized Primary Osteoarthritis Of The Carpometacarpal Joint Of The Right Thumb     Lower Back Pain     Hypercholesterolemia     Glossopharyngeal Neuralgia     Cataract     Benign Essential Hypertension     Psoriasis     Esophageal Reflux     Migraine Headache     Peripheral Neuropathy     Postmenopausal Atrophic Vaginitis     Anxiety disorder     Hypothyroid     Hyperglycemia     Foot pain, right     Chest pain     Chronic back pain     IBS (irritable bowel syndrome)     Abnormal stress test     Coronary artery disease involving native coronary artery of native heart with angina pectoris     Arthritis of midfoot       Current Outpatient Prescriptions   Medication Sig Dispense Refill     aspirin 81 MG EC tablet Take 81 mg by mouth daily.       bacitracin ophthalmic ointment 1 strip.       cyclobenzaprine (FLEXERIL) 10 MG  tablet TAKE ONE-HALF TO ONE TABLET BY MOUTH THREE TIMES A DAY AS NEEDED FOR MUSCLE SPASM 30 tablet 1     cycloSPORINE (RESTASIS) 0.05 % ophthalmic emulsion Administer 1 drop to both eyes 2 (two) times a day.       doxycycline (MONODOX) 50 MG capsule   3     gabapentin (NEURONTIN) 600 MG tablet TAKE 2 TABLETS BY MOUTH THREE TIMES DAILY 540 tablet 3     levothyroxine (SYNTHROID) 88 MCG tablet Take 1 tablet (88 mcg total) by mouth daily. 90 tablet 3     metoprolol tartrate (LOPRESSOR) 25 MG tablet Take 1 tablet (25 mg total) by mouth at bedtime. 90 tablet 2     multivit-mineral-iron-lutein (CENTRUM SILVER ULTRA WOMEN'S) Tab Take 1 tablet by mouth daily.        NIFEdipine (PROCARDIA XL) 30 MG 24 hr tablet Take 1 tablet (30 mg total) by mouth daily. 90 tablet 0     ondansetron (ZOFRAN) 4 MG tablet Take 4 mg by mouth every 8 (eight) hours as needed for nausea.       pantoprazole (PROTONIX) 40 MG tablet TAKE 1 TABLET BY MOUTH DAILY 90 tablet 0     ranitidine (ZANTAC) 150 MG tablet Take 150 mg by mouth every morning.       simvastatin (ZOCOR) 20 MG tablet Take 1 tablet (20 mg total) by mouth at bedtime. 90 tablet 3     No current facility-administered medications for this visit.        History   Smoking Status     Never Smoker   Smokeless Tobacco     Never Used     Comment: No exposure       REVIEW OF SYSTEMS:  Patient denies fever, chills, dizziness, headache, visual change, cough, chest pain, shortness of breath, abdominal pain, extremity pain or swelling.    OBJECTIVE:       Vitals:    04/16/18 1004   BP: 128/78   Pulse: 72     Weight: 145 lb (65.8 kg)  Wt Readings from Last 3 Encounters:   04/16/18 145 lb (65.8 kg)   01/03/18 147 lb (66.7 kg)   11/22/17 148 lb (67.1 kg)     Body mass index is 24.89 kg/(m^2).        Physical Exam:  GENERAL APPEARANCE: A&A, NAD, well hydrated, well nourished  SKIN:  Normal skin turgor, no lesions/rashes   NECK: Supple, without lymphadenopathy, no thyroid mass  CV: RRR, no M/G/R   LUNGS:  CTAB, normal respiratory effort  EXTREMITY: Extremities normal, atraumatic, no swelling  NEURO: no gross deficits   PSYCHIATRIC:  Mood appropriate, memory intact        ASSESSMENT/PLAN:     1. Peripheral Neuropathy  Diagnosed 8/22/2008.  This has been progressive and is now affecting her balance.  She is getting assistance with this through physical therapy.    PHQ 9 score is 6 and Dima 7 score is 3      Return if symptoms worsen or fail to improve.    The visit lasted a total of 25 minutes face to face with the patient.  Over 50% of the time spent counseling and educating the patient about all of the above.      Linsey Gutiérrez MD
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
## 2021-06-19 NOTE — LETTER
Letter by Linsey Gutiérrez MD at      Author: Linsey Gutiérrez MD Service: -- Author Type: --    Filed:  Encounter Date: 10/24/2019 Status: Signed         Lissa Lucero  [address] [facility] [address]             October 24, 2019         Dear MsPatrick Lucero,    Below are the results from your recent visit:    Resulted Orders   Thyroid Stimulating Hormone (TSH)   Result Value Ref Range    TSH 3.32 0.30 - 5.00 uIU/mL       The Thyroid Stimulating Hormone is sneaking up just a little.  Continue the same and we should recheck in 6 months.      Please call with questions.    Sincerely,        Electronically signed by Linsey Gutiérrez MD

## 2021-06-20 NOTE — LETTER
Letter by Ravi Brannon CNP at      Author: Ravi Brannon CNP Service: -- Author Type: --    Filed:  Encounter Date: 6/24/2020 Status: (Other)         Lissa Lucero  [address]  [place] [address]             June 24, 2020         Dear MsPatrick Lucero,    Below are the results from your recent visit:    Resulted Orders   Glycosylated Hemoglobin A1c   Result Value Ref Range    Hemoglobin A1c 5.7 3.5 - 6.0 %   Comprehensive Metabolic Panel   Result Value Ref Range    Sodium 140 136 - 145 mmol/L    Potassium 4.6 3.5 - 5.0 mmol/L    Chloride 102 98 - 107 mmol/L    CO2 25 22 - 31 mmol/L    Anion Gap, Calculation 13 5 - 18 mmol/L    Glucose 103 70 - 125 mg/dL    BUN 9 8 - 28 mg/dL    Creatinine 0.76 0.60 - 1.10 mg/dL    GFR MDRD Af Amer >60 >60 mL/min/1.73m2    GFR MDRD Non Af Amer >60 >60 mL/min/1.73m2    Bilirubin, Total 0.3 0.0 - 1.0 mg/dL    Calcium 9.5 8.5 - 10.5 mg/dL    Protein, Total 6.9 6.0 - 8.0 g/dL    Albumin 4.2 3.5 - 5.0 g/dL    Alkaline Phosphatase 73 45 - 120 U/L    AST 17 0 - 40 U/L    ALT 15 0 - 45 U/L    Narrative    Fasting Glucose reference range is 70-99 mg/dL per  American Diabetes Association (ADA) guidelines.   Lipid Cascade RANDOM   Result Value Ref Range    Cholesterol 176 <=199 mg/dL    Triglycerides 217 (H) <=149 mg/dL    HDL Cholesterol 63 >=50 mg/dL    LDL Calculated 70 <=129 mg/dL    Patient Fasting > 8hrs? Unknown         Labs came back looking okay.  Good blood sugar control.  Normal kidneys, liver, and    electrolytes.  Cholesterol levels are good.       Please call with questions or contact us using [facility].    Sincerely,        Electronically signed by Ravi Brannon CNP

## 2021-06-20 NOTE — LETTER
Letter by Cha Clay RN at      Author: Cha Clay RN Service: -- Author Type: --    Filed:  Encounter Date: 2/24/2020 Status: (Other)       [facility]  [place] [address] [place] [address]        Lissasami Lucero  [address] [facility] [address]    Dear Lissa,     We are writing on behalf of [facility]. [facility] is the department which coordinates documentation of decision-making authority.  Care providers are required to have copies of legal documents when a patient is unable to make their own health care decisions and the person has documented health care wishes and/or appointed a health care agent to make decisions on their behalf.      We have reviewed the Health Care Directive dated 10/10/2015 which you presented for addition to your medical record. Unfortunately, our review indicates the document is not legally valid for the following reasons: The notary did not name you in error, and you dated the document after the notary. In order to create a legal document you will need to have your signature re-notarized or witnessed by 2 people. Notaries and witnesses cannot be named as your health care agents per state law. In addition, only one of your witnesses can be employed by our health care system.    We greatly value the opportunity to assist you in documenting your choices and to honor your   wishes. We apologize for any inconvenience. We have several options to assist you in updating   your document so it meets legal requirements.       Health Care Directives and Advance Care Planning resources can be viewed and printed   for free at our web site:[email or web address].      COPIES of completed Health Care Directives can be brought or mailed to any of our   locations, including the address listed below. You can also email a copy to  [email or web address]       For additional assistance or questions you can email us at [email or web address] or call [phone]    Sincerely,   [facility]  [facility], [facility] and [address] [address]   Email us: [email or web address] Call us: [phone]  Visit at: [email or web address]

## 2021-06-20 NOTE — PROGRESS NOTES
PROGRESS NOTE       SUBJECTIVE:  Lissa Lucero is a 68 y.o. female   Chief Complaint   Patient presents with     Hip Pain     Referral     podiatry      He is here today because of left hip pain.  It hurts in the front of her hip kind of into the groin.  It bothers her most when she is doing shemar chi and it is when she turns in a certain position.  She does have arthritis in her back but she does not have any problems with that.    It has the pain has worked well for sleep.  She has been cutting it in half lately and it works fine.  That would be 7-1/2 mg.    Patient has significant problems with her feet.  She has had surgery in both feet but the left foot seems to have a shortened tendon and she is worked and worked on it to get the toe to lie flat.  It is a problem and she would like to have it looked at again.    Patient Active Problem List   Diagnosis     Osteopenia     Localized Primary Osteoarthritis Of The Carpometacarpal Joint Of The Right Thumb     Lower Back Pain     Hypercholesterolemia     Glossopharyngeal Neuralgia     Cataract     Benign Essential Hypertension     Psoriasis     Esophageal Reflux     Migraine Headache     Peripheral Neuropathy     Postmenopausal Atrophic Vaginitis     Anxiety disorder     Hypothyroid     Hyperglycemia     Foot pain, right     Chronic back pain     IBS (irritable bowel syndrome)     Abnormal stress test     Coronary artery disease involving native coronary artery of native heart with angina pectoris (H)     Arthritis of midfoot     Hypertension     Coronary artery disease     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       aspirin 81 MG EC tablet Take 81 mg by mouth daily.       cyclobenzaprine (FLEXERIL) 10 MG tablet TAKE ONE-HALF TO ONE TABLET BY MOUTH THREE TIMES A DAY AS NEEDED FOR  MUSCLE SPASMS 30 tablet 1     cycloSPORINE (RESTASIS) 0.05 % ophthalmic emulsion Administer 1 drop to both eyes 2 (two) times a day.       doxycycline (MONODOX) 50 MG capsule Take 50 mg by mouth daily.   3     gabapentin (NEURONTIN) 600 MG tablet Take 1,200 mg by mouth 3 (three) times a day.       levothyroxine (SYNTHROID, LEVOTHROID) 50 MCG tablet Take 1 tablet (50 mcg total) by mouth daily. 90 tablet 0     metoprolol tartrate (LOPRESSOR) 25 MG tablet Take 1 tablet (25 mg total) by mouth at bedtime. 90 tablet 2     mirtazapine (REMERON) 15 MG tablet Take 1 tablet (15 mg total) by mouth at bedtime. 30 tablet 2     multivit-mineral-iron-lutein (CENTRUM SILVER ULTRA WOMEN'S) Tab Take 1 tablet by mouth daily.        NIFEdipine (PROCARDIA XL) 30 MG 24 hr tablet TAKE ONE TABLET BY MOUTH EVERY DAY 90 tablet 3     ranitidine (ZANTAC) 150 MG tablet Take 150 mg by mouth every morning.       simvastatin (ZOCOR) 20 MG tablet TAKE ONE TABLET BY MOUTH AT BEDTIME 90 tablet 3     No current facility-administered medications for this visit.        History   Smoking Status     Never Smoker   Smokeless Tobacco     Never Used     Comment: No exposure       REVIEW OF SYSTEMS:  Patient denies fever, chills, dizziness, headache, visual change, ear pain, cough, chest pain, shortness of breath, abdominal pain, extremity pain or swelling, rash,  depression or anxiety.          OBJECTIVE:       Vitals:    08/27/18 1518   BP: 120/72   Pulse: 78   SpO2: 98%     Weight: 149 lb (67.6 kg)  Wt Readings from Last 3 Encounters:   08/27/18 149 lb (67.6 kg)   05/31/18 145 lb (65.8 kg)   05/09/18 145 lb (65.8 kg)     Body mass index is 25.58 kg/(m^2).        Physical Exam:  GENERAL APPEARANCE: A&A, NAD, well hydrated, well nourished  EXTREMITY: Indeed her left big toe does not lie flat unless encouraged with massage.  She has had evidence of previous multiple foot surgeries.  No low back pain to palpation.  Range of motion of both hips is normal.  She  has pain in the left groin.  NEURO: no gross deficits   PSYCHIATRIC:  Mood appropriate, memory intact        ASSESSMENT/PLAN:     1. Hip pain, left  We will inform her of results when available.  - XR Pelvis W 2 Vw Hips Bilateral; Future    2. Left foot pain  Podiatry referral.  - Ambulatory referral to Podiatry      I spent a total of 25 minutes face to face with the patient.  Over 50% of the time spent counseling and educating the patient about all of the above.      Linsey Gutiérrez MD

## 2021-06-20 NOTE — LETTER
Letter by Ravi Brannon CNP at      Author: Ravi Brannon CNP Service: -- Author Type: --    Filed:  Encounter Date: 6/24/2020 Status: (Other)         Lissa Lucero  [address]  [place] [address]             June 24, 2020         Dear MsPatrick Lucero,    Below are the results from your recent visit:    Resulted Orders   US Carotid Bilateral    Narrative    EXAM: US CAROTID BILATERAL  LOCATION: [facility]  DATE/TIME: 6/22/2020 6:17 PM    INDICATION: hx 50-69% in right carotid. Last checked 2.5 years ago with 2 year recheck recomendations  COMPARISON: None.  TECHNIQUE: Duplex exam performed utilizing 2D gray-scale imaging, Doppler interrogation with color-flow and spectral waveform analysis. The percent diameter stenosis is determined using NASCET criteria and Society of Radiologists in Ultrasound Consensus   Criteria.    FINDINGS:    RIGHT: Moderate plaque at the bifurcation. The peak systolic velocity in the ICA is 125-230 cm/sec, consistent with 50-69% stenosis. Normal velocities in the ECA. Antegrade flow within the vertebral artery.     LEFT: Mild plaque at the bifurcation. The peak systolic velocity in the ICA is less than 125 cm/sec, consistent with less than 50% stenosis. Normal velocities in the ECA. Antegrade flow within the vertebral artery.    VELOCITY CHART:  CCA   Right: 73/21 cm/s   Left: 92/22 cm/s  ICA   Right: 163/38 cm/s   Left: 127/35 cm/s  ECA   Right: 118 cm/s   Left: 102 cm/s  ICA/CCA PSV Ratio   Right: 2.2   Left: 1.4      Impression    1.  Moderate plaque formation, velocities consistent with 50-69% stenosis in the right internal carotid artery.  2.  Mild plaque formation, velocities consistent with less than 50% stenosis in the left internal carotid artery.  3.  Flow within the vertebral arteries is antegrade.        Your right carotid still shows a 50-69% stenosis.  No worsening.  Recheck in 1-2 years.     Please call with questions or contact us using  [facility].    Sincerely,        Electronically signed by Ravi Brannon CNP

## 2021-06-20 NOTE — PROGRESS NOTES
"Chief Complaint   Patient presents with     Fall     Pt came home from a meeting and tripped and fell into her toilet shoulders first. Her shoulders are still sore today and she is having difficulty with raising her arms over her head.      HPI: Patient presents today after mechanical fall in which she tripped and fell backwards into a toilet breaking it after suffering a migraine a day after getting the shingles vaccine.  She had her pants down and has bilateral peripheral neuropathy causing numbness and combined together led to the falling incident.  She had a significant amount of pain in her right and left shoulders with left being worse than the right.  Over time the right has slowly been improving but the patient still has a 50% reduction in her range of motion secondary to pain in the left shoulder.  She did not hit her head or lose consciousness.  She describes the left shoulder as feeling \"tight\".  She denies numbness or tingling in that extremity.  She did get a massage in that area which did not help.    ROS:Review of Systems - History obtained from the patient  General ROS: negative  Respiratory ROS: negative  Cardiovascular ROS: negative  Genito-Urinary ROS: negative  Musculoskeletal ROS: positive for - joint pain and joint stiffness  Neurological ROS: positive for - numbness/tingling in feet (baseline)  Dermatological ROS: negative    SH: The Patient's  reports that she has never smoked. She has never used smokeless tobacco. She reports that she does not drink alcohol or use illicit drugs.      FH: The Patient's family history includes Alzheimer's disease in her father; Dementia in her mother; Diabetes type II in her father; ROSA MARIA disease in her brother and sister; Heart disease in her father; Hypertension in her father and mother; Osteoporosis in her mother.     Meds:    Current Outpatient Prescriptions on File Prior to Visit   Medication Sig Dispense Refill     acetaminophen (TYLENOL) 500 MG tablet " Take 500-1,000 mg by mouth every 6 (six) hours as needed for pain.       aspirin 81 MG EC tablet Take 81 mg by mouth daily.       cyclobenzaprine (FLEXERIL) 10 MG tablet TAKE ONE-HALF TO ONE TABLET BY MOUTH THREE TIMES A DAY AS NEEDED FOR MUSCLE SPASMS 30 tablet 1     cycloSPORINE (RESTASIS) 0.05 % ophthalmic emulsion Administer 1 drop to both eyes 2 (two) times a day.       doxycycline (MONODOX) 50 MG capsule Take 50 mg by mouth daily.   3     gabapentin (NEURONTIN) 600 MG tablet Take 1,200 mg by mouth 3 (three) times a day.       levothyroxine (SYNTHROID, LEVOTHROID) 50 MCG tablet Take 1 tablet (50 mcg total) by mouth daily. 90 tablet 3     metoprolol tartrate (LOPRESSOR) 25 MG tablet Take 1 tablet (25 mg total) by mouth at bedtime. 90 tablet 2     mirtazapine (REMERON) 7.5 MG tablet Take 1 tablet (7.5 mg total) by mouth at bedtime. 90 tablet 3     multivit-mineral-iron-lutein (CENTRUM SILVER ULTRA WOMEN'S) Tab Take 1 tablet by mouth daily.        NIFEdipine (PROCARDIA XL) 30 MG 24 hr tablet TAKE ONE TABLET BY MOUTH EVERY DAY 90 tablet 3     ranitidine (ZANTAC) 150 MG tablet Take 150 mg by mouth every morning.       simvastatin (ZOCOR) 20 MG tablet TAKE ONE TABLET BY MOUTH AT BEDTIME 90 tablet 3     No current facility-administered medications on file prior to visit.        O:  /70 (Patient Site: Left Arm, Patient Position: Sitting, Cuff Size: Adult Regular)  Pulse 66  Wt 151 lb 3.2 oz (68.6 kg)  LMP 10/12/1981  BMI 25.95 kg/m2    Physical Examination:   General appearance - alert, well appearing, and in no distress  Mental status - alert, oriented to person, place, and time  Neck - supple, no significant adenopathy  Lymphatics - no palpable lymphadenopathy, no hepatosplenomegaly  Chest - clear to auscultation, no wheezes, rales or rhonchi, symmetric air entry  Heart - normal rate and regular rhythm, S1 and S2 normal, no murmurs noted  Abdomen - soft, nontender, nondistended, no masses or  organomegaly  Neurological - alert, oriented, normal speech, no focal findings or movement disorder noted, neck supple without rigidity, cranial nerves II through XII intact, motor and sensory grossly normal bilaterally, normal muscle tone, no tremors, strength 5/5  Musculoskeletal -right shoulder has full range of motion with mild pain with hyper extension and flexion.  Mild crepitus.  The left arm is unable to be fully raised above the head secondary to pain.  She also describes pain fullness with palpation along the posterior humeral head and scapula. Drop can test negative  Extremities - peripheral pulses normal, no pedal edema, no clubbing or cyanosis  Skin - normal coloration and turgor, no rashes, no suspicious skin lesions noted      A/P:     Problem List Items Addressed This Visit        ENT/CARD/PULM/ENDO Problems    Migraine Headache       Other    Peripheral Neuropathy      Other Visit Diagnoses     Acute pain of left shoulder due to trauma    -  Primary    Relevant Orders    XR Shoulder Left 2 or More VWS (Completed)            1. Acute pain of left shoulder due to trauma  Rule out fracture.  If symptoms continue/worsen will order MRI. Recommend over the counter pain medicines and icing the area.    - XR Shoulder Left 2 or More VWS; Future    2. Migraine  Patient attributes last migraine to receiving the shingles vaccine.  Under adequate control now.    3. Peripheral Neuropathy  At baseline.  Recommend slow intentional movements to avoid further falls.        Ravi Brannon, CNP    This transcription was created utilizing voice recognition software and may contain typographical errors.
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
## 2021-06-22 ENCOUNTER — APPOINTMENT (OUTPATIENT)
Dept: URBAN - METROPOLITAN AREA CLINIC 260 | Age: 71
Setting detail: DERMATOLOGY
End: 2021-06-22

## 2021-06-22 VITALS — WEIGHT: 120 LBS | HEIGHT: 63 IN

## 2021-06-22 DIAGNOSIS — L57.0 ACTINIC KERATOSIS: ICD-10-CM

## 2021-06-22 PROCEDURE — OTHER LIQUID NITROGEN: OTHER

## 2021-06-22 PROCEDURE — 17003 DESTRUCT PREMALG LES 2-14: CPT | Mod: 79

## 2021-06-22 PROCEDURE — 17000 DESTRUCT PREMALG LESION: CPT | Mod: 79

## 2021-06-22 ASSESSMENT — LOCATION SIMPLE DESCRIPTION DERM
LOCATION SIMPLE: LEFT LIP
LOCATION SIMPLE: RIGHT EYEBROW
LOCATION SIMPLE: RIGHT LIP
LOCATION SIMPLE: NOSE
LOCATION SIMPLE: LEFT CHEEK
LOCATION SIMPLE: RIGHT FOREHEAD
LOCATION SIMPLE: LEFT FOREHEAD

## 2021-06-22 ASSESSMENT — LOCATION DETAILED DESCRIPTION DERM
LOCATION DETAILED: LEFT INFERIOR FOREHEAD
LOCATION DETAILED: NASAL DORSUM
LOCATION DETAILED: LEFT UPPER CUTANEOUS LIP
LOCATION DETAILED: LEFT SUPERIOR VERMILION LIP
LOCATION DETAILED: RIGHT UPPER CUTANEOUS LIP
LOCATION DETAILED: LEFT CENTRAL MALAR CHEEK
LOCATION DETAILED: RIGHT LATERAL EYEBROW
LOCATION DETAILED: LEFT MEDIAL MALAR CHEEK
LOCATION DETAILED: RIGHT INFERIOR FOREHEAD

## 2021-06-22 ASSESSMENT — LOCATION ZONE DERM
LOCATION ZONE: LIP
LOCATION ZONE: NOSE
LOCATION ZONE: FACE

## 2021-06-22 NOTE — PROCEDURE: LIQUID NITROGEN
Number Of Freeze-Thaw Cycles: 2 freeze-thaw cycles
Detail Level: Simple
Post-Care Instructions: I reviewed with the patient in detail post-care instructions. Patient is to wear sunprotection, and avoid picking at any of the treated lesions. Pt may apply Vaseline to crusted or scabbing areas.
Render Post-Care Instructions In Note?: yes
Render Note In Bullet Format When Appropriate: No
Consent: The patient's consent was obtained including but not limited to risks of crusting, scabbing, blistering, scarring, darker or lighter pigmentary change, recurrence, incomplete removal and infection.
Duration Of Freeze Thaw-Cycle (Seconds): 3

## 2021-06-23 NOTE — TELEPHONE ENCOUNTER
[place] APPROVED:    Approval start date: 12/30/18  Approval end date: 12/31/19    Pharmacy has been notified of approval and will contact patient when medication is ready for pickup.     Unable to copy/paste in to Epic.

## 2021-06-23 NOTE — TELEPHONE ENCOUNTER
Ravi rahman recd a letter from [employer]ha- her rx carrier stating she needs a p/a on her:  cycobenzadrene- a muscle relaxer. How do we go about doing this. She states there should be an old p/a on file for this.  Pt at [phone]

## 2021-06-23 NOTE — TELEPHONE ENCOUNTER
RN cannot approve Refill Request    RN can NOT refill this medication med is not covered by policy/route to provider. Last office visit: 11/1/2018 Linsey Gutiérrez MD Last Physical: 5/8/2017 Last MTM visit: Visit date not found Last visit same specialty: 11/1/2018 Linsey Gutiérrez MD.  Next visit within 3 mo: Visit date not found  Next physical within 3 mo: Visit date not found      Guillermina Sarmiento, [facility] Triage/Med Refill 1/21/2019    Requested Prescriptions   Pending Prescriptions Disp Refills     cyclobenzaprine (FLEXERIL) 10 MG tablet [Pharmacy Med Name: CYCLOBENZAPRINE HCL 10MG TABS] 30 tablet 1     Sig: TAKE ONE-HALF TO ONE TABLET BY MOUTH THREE TIMES A DAY AS NEEDED FOR MUSCLE SPASMS    There is no refill protocol information for this order
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
## 2021-07-03 NOTE — ANESTHESIA PREPROCEDURE EVALUATION
Anesthesia Preprocedure Evaluation by Jolene Olguin MD at 5/3/2018  7:01 PM     Author: Jolene Olguin MD Service: -- Author Type: Physician    Filed: 5/3/2018  8:02 PM Date of Service: 5/3/2018  7:01 PM Status: Addendum    : Jolene Olguin MD (Physician)    Related Notes: Original Note by Jolene Olguin MD (Physician) filed at 5/3/2018  7:03 PM       Anesthesia Evaluation      Patient summary reviewed   History of anesthetic complications (PONV)     Airway   Mallampati: I  Neck ROM: full   Pulmonary - normal exam    breath sounds clear to auscultation                         Cardiovascular - normal exam  (+) hypertension, CAD, angina, , hypercholesterolemia, PVD (s/p left CEA)    (-) murmur  ECG reviewed  Rhythm: regular  Rate: normal,    no murmur      Neuro/Psych    (+) neuromuscular disease,  anxiety/panic attacks,     Comments: Glossopharyngeal neuralgia    Endo/Other    (+) hypothyroidism,      GI/Hepatic/Renal    (+) GERD (received protonix earlier today) intermittent,        Other findings:   Memory Lapses Or Loss    Osteopenia    Localized Primary Osteoarthritis Of The Carpometacarpal Joint Of The Right Thumb    Lower Back Pain    Hypercholesterolemia    Glossopharyngeal Neuralgia    Cataract    Benign Essential Hypertension    Psoriasis    Esophageal Reflux    Migraine Headache    Peripheral Neuropathy    Postmenopausal Atrophic Vaginitis    Anxiety disorder    Hypothyroid    Hyperglycemia    Foot pain, right    Chest pain    Chronic back pain    IBS (irritable bowel syndrome)    Abnormal stress test    Coronary artery disease involving native coronary artery of native heart with angina pectoris    Arthritis of midfoot     NM Stress 12/28/16  Conclusion       NM Stress LVEF 75 %    The findings of this examination were communicated to the nursing staff at [facility].    There is no prior study for comparison.    The  images demonstrate diaphragmatic  attenuation and patient motion; motion correction algorithm utilized. Patient movement was significant. Patient was moving right arm aross chest during stress images. Final image quality is suboptimal.         ECG Summary     ECG  Baseline electrocardiogram demonstrates sinus rhythm.   The stress electrocardiogram is negative for inducible ischemia.There were no arrhythmias during stress. There were no arrhythmias during recovery.    12/29/16 CTA coronary  Interpretation Summary       The total Agatston calcium score is 36. A calcium score in this range places the individual in the 50th percentile when compared to an age and gender matched control group and implies a moderate risk of cardiac events in the next ten years.    Moderate soft plaque with stenosis in the mid RCA.Estimated luminal stenosis of 50-70%.Potentially flow limiting.    Mild disease in the more distal mid RCA    Mild calcified plaque and stenosis in the proximal LAD. Estimated luminal stenosis of 25-30%        Angiogram shows mild coronary artery disease with a 30% lesion in the right coronary artery. 1/5/2017.      Dental    (+) poor dentition    Comment: Of note: crossbite, significant                         Anesthesia Plan  Planned anesthetic: general endotracheal  Decadron 4 mg IV  Zofran 4 mg  Scopolamine TD in preop  Propofol infusion for PONV prophylaxis  ASA 3 - emergent   Induction: intravenous   Anesthetic plan and risks discussed with: patient  Anesthesia plan special considerations: antiemetics,   Post-op plan: routine recovery

## 2021-07-03 NOTE — ANESTHESIA PREPROCEDURE EVALUATION
Anesthesia Preprocedure Evaluation by Yen Diop MD at 5/19/2017  9:20 AM     Author: Yen Diop MD Service: -- Author Type: Physician    Filed: 5/19/2017 10:05 AM Date of Service: 5/19/2017  9:20 AM Status: Addendum    : Yen Diop MD (Physician)    Related Notes: Original Note by Yen Diop MD (Physician) filed at 5/19/2017  9:45 AM       Anesthesia Evaluation      Patient summary reviewed   History of anesthetic complications (PONV, slow to wake)     Airway   Mallampati: I  Neck ROM: full   Pulmonary - normal exam    breath sounds clear to auscultation                         Cardiovascular - normal exam  (+) hypertension, CAD (Angiogram shows mild coronary artery disease with a 30% lesion in the right coronary artery.  1/5/2017.), , hypercholesterolemia, PVD (s/p left CEA)    (-) murmur  ECG reviewed  Rhythm: regular  Rate: normal,    no murmur      Neuro/Psych    (+) neuromuscular disease (Peripheral neuropathy),  anxiety/panic attacks,     Comments: Glossopharyngeal neuralgia    Endo/Other    (+) hypothyroidism,      GI/Hepatic/Renal    (+) GERD,        Other findings:   Memory Lapses Or Loss    Osteopenia    Localized Primary Osteoarthritis Of The Carpometacarpal Joint Of The Right Thumb    Lower Back Pain    Hypercholesterolemia    Glossopharyngeal Neuralgia    Cataract    Benign Essential Hypertension    Psoriasis    Esophageal Reflux    Migraine Headache    Peripheral Neuropathy    Postmenopausal Atrophic Vaginitis    Anxiety disorder    Hypothyroid    Hyperglycemia    Foot pain, right    Chest pain    Chronic back pain    IBS (irritable bowel syndrome)    Abnormal stress test    Coronary artery disease involving native coronary artery of native heart with angina pectoris    Arthritis of midfoot     NM Stress 12/28/16  Conclusion       NM Stress LVEF 75 %    The findings of this examination were communicated to the nursing staff at [facility].    There is no prior study  for comparison.    The  images demonstrate diaphragmatic attenuation and patient motion; motion correction algorithm utilized. Patient movement was significant. Patient was moving right arm aross chest during stress images. Final image quality is suboptimal.         ECG Summary     ECG  Baseline electrocardiogram demonstrates sinus rhythm.   The stress electrocardiogram is negative for inducible ischemia.There were no arrhythmias during stress. There were no arrhythmias during recovery.    12/29/16 CTA coronary  Interpretation Summary       The total Agatston calcium score is 36. A calcium score in this range places the individual in the 50th percentile when compared to an age and gender matched control group and implies a moderate risk of cardiac events in the next ten years.    Moderate soft plaque with stenosis in the mid RCA.Estimated luminal stenosis of 50-70%.Potentially flow limiting.    Mild disease in the more distal mid RCA    Mild calcified plaque and stenosis in the proximal LAD. Estimated luminal stenosis of 25-30%        Angiogram shows mild coronary artery disease with a 30% lesion in the right coronary artery. 1/5/2017.      Dental    (+) poor dentition                           Anesthesia Plan  Planned anesthetic: MAC  Decadron 4 mg IV  Zofran  ASA 3   Induction: intravenous   Anesthetic plan and risks discussed with: patient  Anesthesia plan special considerations: antiemetics,   Post-op plan: routine recovery

## 2021-07-03 NOTE — ADDENDUM NOTE
Addendum Note by Corbin Brannon CNP at 3/27/2019  3:40 PM     Author: Corbin Brannon CNP Service: -- Author Type: Nurse Practitioner    Filed: 4/2/2019  8:23 AM Encounter Date: 3/27/2019 Status: Signed    : Corbin Brannon CNP (Nurse Practitioner)    Addended by: CORBIN BRANNON on: 4/2/2019 08:23 AM        Modules accepted: Orders

## 2021-07-06 ENCOUNTER — APPOINTMENT (OUTPATIENT)
Dept: URBAN - METROPOLITAN AREA CLINIC 260 | Age: 71
Setting detail: DERMATOLOGY
End: 2021-07-07

## 2021-07-06 VITALS — WEIGHT: 120 LBS | HEIGHT: 63 IN

## 2021-07-06 DIAGNOSIS — L57.0 ACTINIC KERATOSIS: ICD-10-CM

## 2021-07-06 DIAGNOSIS — D49.2 NEOPLASM OF UNSPECIFIED BEHAVIOR OF BONE, SOFT TISSUE, AND SKIN: ICD-10-CM

## 2021-07-06 PROCEDURE — 17000 DESTRUCT PREMALG LESION: CPT | Mod: 59

## 2021-07-06 PROCEDURE — 11102 TANGNTL BX SKIN SINGLE LES: CPT

## 2021-07-06 PROCEDURE — OTHER BIOPSY BY SHAVE METHOD: OTHER

## 2021-07-06 PROCEDURE — 88305 TISSUE EXAM BY PATHOLOGIST: CPT

## 2021-07-06 PROCEDURE — OTHER PATHOLOGY BILLING: OTHER

## 2021-07-06 PROCEDURE — OTHER LIQUID NITROGEN: OTHER

## 2021-07-06 ASSESSMENT — LOCATION DETAILED DESCRIPTION DERM: LOCATION DETAILED: LEFT INFERIOR MEDIAL FOREHEAD

## 2021-07-06 ASSESSMENT — LOCATION ZONE DERM: LOCATION ZONE: FACE

## 2021-07-06 ASSESSMENT — LOCATION SIMPLE DESCRIPTION DERM: LOCATION SIMPLE: LEFT FOREHEAD

## 2021-07-06 NOTE — PROCEDURE: BIOPSY BY SHAVE METHOD
Validate That Anesthesia Is Not 0: No
Was A Bandage Applied: Yes
Additional Anesthesia Volume In Cc (Will Not Render If 0): 0
Hemostasis: Drysol
Consent: Written consent was obtained and risks were reviewed including but not limited to scarring, infection, bleeding, scabbing, incomplete removal, nerve damage and allergy to anesthesia.
Biopsy Type: H and E
Silver Nitrate Text: The wound bed was treated with silver nitrate after the biopsy was performed.
Detail Level: Detailed
Notification Instructions: Patient will be notified of biopsy results. However, patient instructed to call the office if not contacted within 2 weeks.
Electrodesiccation And Curettage Text: The wound bed was treated with electrodesiccation and curettage after the biopsy was performed.
Biopsy Method: Dermablade
Type Of Destruction Used: Curettage
Post-Care Instructions: I reviewed with the patient in detail post-care instructions. Patient is to keep the biopsy site dry overnight, and then apply bacitracin twice daily until healed. Patient may apply hydrogen peroxide soaks to remove any crusting.
Wound Care: Petrolatum
Electrodesiccation Text: The wound bed was treated with electrodesiccation after the biopsy was performed.
Anesthesia Type: 1% lidocaine with epinephrine
Cryotherapy Text: The wound bed was treated with cryotherapy after the biopsy was performed.
Information: Selecting Yes will display possible errors in your note based on the variables you have selected. This validation is only offered as a suggestion for you. PLEASE NOTE THAT THE VALIDATION TEXT WILL BE REMOVED WHEN YOU FINALIZE YOUR NOTE. IF YOU WANT TO FAX A PRELIMINARY NOTE YOU WILL NEED TO TOGGLE THIS TO 'NO' IF YOU DO NOT WANT IT IN YOUR FAXED NOTE.
Curettage Text: The wound bed was treated with curettage after the biopsy was performed.
Anesthesia Volume In Cc (Will Not Render If 0): 0.5
Depth Of Biopsy: dermis
Billing Type: Client Bill
Dressing: bandage

## 2021-07-06 NOTE — PROCEDURE: LIQUID NITROGEN
Render Post-Care Instructions In Note?: yes
Consent: The patient's consent was obtained including but not limited to risks of crusting, scabbing, blistering, scarring, darker or lighter pigmentary change, recurrence, incomplete removal and infection.
Number Of Freeze-Thaw Cycles: 2 freeze-thaw cycles
Duration Of Freeze Thaw-Cycle (Seconds): 3
Render Note In Bullet Format When Appropriate: No
Post-Care Instructions: I reviewed with the patient in detail post-care instructions. Patient is to wear sunprotection, and avoid picking at any of the treated lesions. Pt may apply Vaseline to crusted or scabbing areas.
Detail Level: Detailed

## 2021-07-06 NOTE — PROCEDURE: PATHOLOGY BILLING
Immunohistochemistry (88687 and [id number]) billing is not performed here. Please use the Immunohistochemistry Stain Billing plan to accomplish this. Immunohistochemistry (89502 and [id number]) billing is not performed here. Please use the Immunohistochemistry Stain Billing plan to accomplish this.

## 2021-07-21 ENCOUNTER — RECORDS - HEALTHEAST (OUTPATIENT)
Dept: ADMINISTRATIVE | Facility: CLINIC | Age: 71
End: 2021-07-21

## 2021-08-18 NOTE — TELEPHONE ENCOUNTER
Addressed.    Ravi Brannon, CNP  
Received a fax from [employer].    Fax is in Ravi's inbox.    5/24/2019  
negative

## 2021-09-05 ENCOUNTER — HEALTH MAINTENANCE LETTER (OUTPATIENT)
Age: 71
End: 2021-09-05

## 2021-09-15 ENCOUNTER — APPOINTMENT (OUTPATIENT)
Dept: URBAN - METROPOLITAN AREA CLINIC 260 | Age: 71
Setting detail: DERMATOLOGY
End: 2021-09-17

## 2021-09-15 VITALS — HEIGHT: 62 IN | WEIGHT: 118 LBS | RESPIRATION RATE: 17 BRPM

## 2021-09-15 DIAGNOSIS — L24 IRRITANT CONTACT DERMATITIS: ICD-10-CM

## 2021-09-15 DIAGNOSIS — L81.4 OTHER MELANIN HYPERPIGMENTATION: ICD-10-CM

## 2021-09-15 PROBLEM — L24.9 IRRITANT CONTACT DERMATITIS, UNSPECIFIED CAUSE: Status: ACTIVE | Noted: 2021-09-15

## 2021-09-15 PROCEDURE — OTHER PRESCRIPTION: OTHER

## 2021-09-15 PROCEDURE — 99213 OFFICE O/P EST LOW 20 MIN: CPT

## 2021-09-15 PROCEDURE — OTHER ADDITIONAL NOTES: OTHER

## 2021-09-15 PROCEDURE — OTHER COUNSELING: OTHER

## 2021-09-15 RX ORDER — TACROLIMUS 1 MG/G
OINTMENT TOPICAL BID
Qty: 60 | Refills: 1 | Status: ERX | COMMUNITY
Start: 2021-09-15

## 2021-09-15 ASSESSMENT — LOCATION DETAILED DESCRIPTION DERM
LOCATION DETAILED: GLABELLA
LOCATION DETAILED: RIGHT CENTRAL MALAR CHEEK

## 2021-09-15 ASSESSMENT — LOCATION ZONE DERM: LOCATION ZONE: FACE

## 2021-09-15 ASSESSMENT — LOCATION SIMPLE DESCRIPTION DERM
LOCATION SIMPLE: GLABELLA
LOCATION SIMPLE: RIGHT CHEEK

## 2021-09-15 NOTE — PROCEDURE: ADDITIONAL NOTES
Additional Notes: Patient thinks she is sensitive to steroids. Recommended tacrolimus for patient to use bid.
Render Risk Assessment In Note?: no
Detail Level: Detailed

## 2021-09-15 NOTE — HPI: RASH
What Type Of Note Output Would You Prefer (Optional)?: Bullet Format
How Severe Is Your Rash?: moderate
Is This A New Presentation, Or A Follow-Up?: Rash
Additional History: She had a steroid injection for one of her health conditions and then noticed this rash appear. She also had headaches a few days after her steroid injections, had trouble sleeping. She is very sensitive to steroids.

## 2021-12-01 ENCOUNTER — TRANSFERRED RECORDS (OUTPATIENT)
Dept: HEALTH INFORMATION MANAGEMENT | Facility: CLINIC | Age: 71
End: 2021-12-01
Payer: MEDICARE

## 2022-10-23 ENCOUNTER — HEALTH MAINTENANCE LETTER (OUTPATIENT)
Age: 72
End: 2022-10-23

## 2022-12-01 ENCOUNTER — APPOINTMENT (OUTPATIENT)
Dept: URBAN - METROPOLITAN AREA CLINIC 260 | Age: 72
Setting detail: DERMATOLOGY
End: 2022-12-02

## 2022-12-01 VITALS — HEIGHT: 62 IN | WEIGHT: 118 LBS

## 2022-12-01 DIAGNOSIS — D22 MELANOCYTIC NEVI: ICD-10-CM

## 2022-12-01 DIAGNOSIS — Z80.8 FAMILY HISTORY OF MALIGNANT NEOPLASM OF OTHER ORGANS OR SYSTEMS: ICD-10-CM

## 2022-12-01 DIAGNOSIS — Z71.89 OTHER SPECIFIED COUNSELING: ICD-10-CM

## 2022-12-01 DIAGNOSIS — L70.8 OTHER ACNE: ICD-10-CM

## 2022-12-01 DIAGNOSIS — H61.11 ACQUIRED DEFORMITY OF PINNA: ICD-10-CM

## 2022-12-01 DIAGNOSIS — L82.1 OTHER SEBORRHEIC KERATOSIS: ICD-10-CM

## 2022-12-01 DIAGNOSIS — D18.0 HEMANGIOMA: ICD-10-CM

## 2022-12-01 DIAGNOSIS — L81.4 OTHER MELANIN HYPERPIGMENTATION: ICD-10-CM

## 2022-12-01 PROBLEM — D22.5 MELANOCYTIC NEVI OF TRUNK: Status: ACTIVE | Noted: 2022-12-01

## 2022-12-01 PROBLEM — D18.01 HEMANGIOMA OF SKIN AND SUBCUTANEOUS TISSUE: Status: ACTIVE | Noted: 2022-12-01

## 2022-12-01 PROBLEM — H61.112 ACQUIRED DEFORMITY OF PINNA, LEFT EAR: Status: ACTIVE | Noted: 2022-12-01

## 2022-12-01 PROCEDURE — 99213 OFFICE O/P EST LOW 20 MIN: CPT

## 2022-12-01 PROCEDURE — OTHER MIPS QUALITY: OTHER

## 2022-12-01 PROCEDURE — OTHER ADDITIONAL NOTES: OTHER

## 2022-12-01 PROCEDURE — OTHER COUNSELING: OTHER

## 2022-12-01 ASSESSMENT — LOCATION DETAILED DESCRIPTION DERM
LOCATION DETAILED: LEFT ANTERIOR EARLOBE
LOCATION DETAILED: LEFT INFERIOR CENTRAL MALAR CHEEK
LOCATION DETAILED: INFERIOR THORACIC SPINE

## 2022-12-01 ASSESSMENT — LOCATION ZONE DERM
LOCATION ZONE: TRUNK
LOCATION ZONE: FACE
LOCATION ZONE: EAR

## 2022-12-01 ASSESSMENT — LOCATION SIMPLE DESCRIPTION DERM
LOCATION SIMPLE: UPPER BACK
LOCATION SIMPLE: LEFT CHEEK
LOCATION SIMPLE: LEFT EAR

## 2022-12-01 NOTE — PROCEDURE: ADDITIONAL NOTES
Detail Level: Detailed
Render Risk Assessment In Note?: no
Additional Notes: Will refer to Susanne Koo MD or Odalys Beltre MD.

## 2022-12-01 NOTE — PROCEDURE: MIPS QUALITY
Quality 130: Documentation Of Current Medications In The Medical Record: Current Medications Documented
Quality 110: Preventive Care And Screening: Influenza Immunization: Influenza Immunization previously received during influenza season
Quality 111:Pneumonia Vaccination Status For Older Adults: Pneumococcal vaccine (PPSV23) was not administered on or after patient’s 60th birthday and before the end of the measurement period, reason not otherwise specified
Detail Level: Detailed
Quality 431: Preventive Care And Screening: Unhealthy Alcohol Use - Screening: Patient not identified as an unhealthy alcohol user when screened for unhealthy alcohol use using a systematic screening method
Quality 226: Preventive Care And Screening: Tobacco Use: Screening And Cessation Intervention: Patient screened for tobacco use and is an ex/non-smoker

## 2023-01-12 ENCOUNTER — APPOINTMENT (OUTPATIENT)
Dept: URBAN - METROPOLITAN AREA CLINIC 255 | Age: 73
Setting detail: DERMATOLOGY
End: 2023-01-16

## 2023-01-12 DIAGNOSIS — H61.11 ACQUIRED DEFORMITY OF PINNA: ICD-10-CM

## 2023-01-12 PROBLEM — H61.113 ACQUIRED DEFORMITY OF PINNA, BILATERAL: Status: ACTIVE | Noted: 2023-01-12

## 2023-01-12 PROCEDURE — OTHER EARLOBE REPAIR COSMETIC: OTHER

## 2023-01-12 PROCEDURE — OTHER COUNSELING: OTHER

## 2023-01-12 PROCEDURE — OTHER MIPS QUALITY: OTHER

## 2023-01-12 ASSESSMENT — LOCATION SIMPLE DESCRIPTION DERM
LOCATION SIMPLE: LEFT EAR
LOCATION SIMPLE: RIGHT EAR

## 2023-01-12 ASSESSMENT — LOCATION DETAILED DESCRIPTION DERM
LOCATION DETAILED: RIGHT ANTERIOR EARLOBE
LOCATION DETAILED: LEFT ANTERIOR EARLOBE

## 2023-01-12 ASSESSMENT — LOCATION ZONE DERM: LOCATION ZONE: EAR

## 2023-01-12 NOTE — PROCEDURE: EARLOBE REPAIR COSMETIC
Price (Use Numbers Only, No Special Characters Or $): 500
Repair Type: Intermediate Layered Repair
Hemostasis: Ligature
Anesthesia Type: 1% lidocaine with epinephrine
Post-Care Instructions: I reviewed with the patient in detail post-care instructions. Patient is not to engage in any heavy lifting, exercise, or swimming for the next 14 days. Should the patient develop any fevers, chills, bleeding, severe pain patient will contact the office immediately.
Body Location Override (Optional - Billing Will Still Be Based On Selected Body Map Location If Applicable): Right Earlobe
Consent: The rationale for Repairs was explained to the patient and consent was obtained. The risks, benefits and alternatives to therapy were discussed in detail. Specifically, the risks of infection, scarring, bleeding, prolonged wound healing, incomplete removal, allergy to anesthesia, nerve injury and recurrence were addressed. Prior to the procedure, the treatment site was clearly identified and confirmed by the patient. All components of Universal Protocol/PAUSE Rule completed.
Detail Level: Detailed
Epidermal Closure: simple interrupted
Include Undermining In The Note?: No
Epidermal Sutures: 6-0 Ethilon
Suture Removal: 7 days
Wound Length (In Cm): 0
Body Location Override (Optional - Billing Will Still Be Based On Selected Body Map Location If Applicable): Left Earlobe
Estimated Blood Loss (Cc): minimal
Wound Care: Petrolatum
Epidermal Sutures: 6-0 Surgipro

## 2023-01-12 NOTE — PROCEDURE: MIPS QUALITY
Quality 226: Preventive Care And Screening: Tobacco Use: Screening And Cessation Intervention: Patient screened for tobacco use and is an ex/non-smoker
Detail Level: Detailed
Quality 130: Documentation Of Current Medications In The Medical Record: Current Medications Documented
Quality 110: Preventive Care And Screening: Influenza Immunization: Influenza Immunization previously received during influenza season
Quality 431: Preventive Care And Screening: Unhealthy Alcohol Use - Screening: Patient not identified as an unhealthy alcohol user when screened for unhealthy alcohol use using a systematic screening method
Quality 111:Pneumonia Vaccination Status For Older Adults: Pneumococcal vaccine (PPSV23) was not administered on or after patient’s 60th birthday and before the end of the measurement period, reason not otherwise specified
Yes

## 2023-01-20 ENCOUNTER — APPOINTMENT (OUTPATIENT)
Dept: URBAN - METROPOLITAN AREA CLINIC 260 | Age: 73
Setting detail: DERMATOLOGY
End: 2023-01-20

## 2023-01-20 DIAGNOSIS — Z48.02 ENCOUNTER FOR REMOVAL OF SUTURES: ICD-10-CM

## 2023-01-20 PROCEDURE — 99024 POSTOP FOLLOW-UP VISIT: CPT

## 2023-01-20 PROCEDURE — OTHER SUTURE REMOVAL (GLOBAL PERIOD): OTHER

## 2023-01-20 PROCEDURE — OTHER MIPS QUALITY: OTHER

## 2023-01-20 ASSESSMENT — LOCATION SIMPLE DESCRIPTION DERM
LOCATION SIMPLE: RIGHT EAR
LOCATION SIMPLE: LEFT EAR

## 2023-01-20 ASSESSMENT — LOCATION DETAILED DESCRIPTION DERM
LOCATION DETAILED: RIGHT ANTERIOR EARLOBE
LOCATION DETAILED: LEFT ANTERIOR EARLOBE

## 2023-01-20 ASSESSMENT — LOCATION ZONE DERM: LOCATION ZONE: EAR

## 2023-01-20 NOTE — PROCEDURE: SUTURE REMOVAL (GLOBAL PERIOD)
Add 41490 Cpt? (Important Note: In 2017 The Use Of 92291 Is Being Tracked By Cms To Determine Future Global Period Reimbursement For Global Periods): yes
Detail Level: Detailed

## 2023-01-20 NOTE — PROCEDURE: MIPS QUALITY
Detail Level: Detailed
Quality 110: Preventive Care And Screening: Influenza Immunization: Influenza Immunization previously received during influenza season
Quality 111:Pneumonia Vaccination Status For Older Adults: Pneumococcal vaccine (PPSV23) was not administered on or after patient’s 60th birthday and before the end of the measurement period, reason not otherwise specified
Quality 431: Preventive Care And Screening: Unhealthy Alcohol Use - Screening: Patient not identified as an unhealthy alcohol user when screened for unhealthy alcohol use using a systematic screening method
Quality 226: Preventive Care And Screening: Tobacco Use: Screening And Cessation Intervention: Patient screened for tobacco use and is an ex/non-smoker
Quality 130: Documentation Of Current Medications In The Medical Record: Current Medications Documented

## 2023-03-20 ENCOUNTER — TELEPHONE (OUTPATIENT)
Dept: GERIATRICS | Facility: CLINIC | Age: 73
End: 2023-03-20
Payer: MEDICARE

## 2023-03-20 DIAGNOSIS — S32.501D UNSPECIFIED FRACTURE OF RIGHT PUBIS, SUBSEQUENT ENCOUNTER FOR FRACTURE WITH ROUTINE HEALING: Primary | ICD-10-CM

## 2023-03-20 DIAGNOSIS — S32.501D CLOSED NONDISPLACED FRACTURE OF RIGHT PUBIS WITH ROUTINE HEALING, SUBSEQUENT ENCOUNTER: ICD-10-CM

## 2023-03-20 RX ORDER — OXYCODONE HYDROCHLORIDE 5 MG/1
TABLET ORAL
Qty: 18 TABLET | Refills: 0 | Status: SHIPPED | OUTPATIENT
Start: 2023-03-20 | End: 2023-03-22

## 2023-03-20 NOTE — TELEPHONE ENCOUNTER
New patient from [facility] today with closed nondisplaced fracture of right pubis.    Came with orders for oxycodone 10mg every 6 hours prn and when Senia found out she was upset as she was taking the oxycodone every 4 hours prn.    Nurse stated she tried to call the number on the script but goes to a VM.    This NP felt better doing a range order of 5-10 mg based on a pain scale.  May be easier to get her off this eventually.      New orders:    Oxycodone 5mg tabs give 5-10mg po every 4 hours PRN.  Pain rated 1-5 = 5mg and pain rated 6-10 = 10mg.    Order sent to [facility] pharmacy    Electronically signed by Katrin Hoover RN, CNP

## 2023-03-21 ENCOUNTER — LAB REQUISITION (OUTPATIENT)
Dept: LAB | Facility: CLINIC | Age: 73
End: 2023-03-21
Payer: MEDICARE

## 2023-03-21 DIAGNOSIS — I10 ESSENTIAL (PRIMARY) HYPERTENSION: ICD-10-CM

## 2023-03-21 NOTE — TELEPHONE ENCOUNTER
"[facility] Triage Nurse Telephone Encounter    Provider: PRAVEEN Augustin CNP   Facility: [facility]  Facility Type:  TCU    Caller: Sergio  Call Back Number: [phone]    Allergies:    Allergies   Allergen Reactions     Hydrocodone-Acetaminophen Anxiety     Other reaction(s): Dizziness  Disorientation     Boniva [Ibandronic Acid]      Carbamazepine      Other reaction(s): *Unknown, Ataxia  Other reaction(s): Ataxia     Docosahexaenoic Acid (Dha)      Other reaction(s): Chest Pain  States currently using another brand of Omega 3, which is working well with no side effects.  12/02/16     Ethylhexyl Glycerin      Other reaction(s): Other (See Comments)  boniva caused racing heart, chest pain symptoms  Other reaction(s): Chest Pain, Tachycardia  boniva caused racing heart, chest pain symptoms     Levetiracetam      Other reaction(s): Intolerance-Can't Take  Levetiracetam     Methadone      Other reaction(s): Hallucinations  \"saw bugs\"     Morphine      Penicillins      Serotonin      Sulfa Drugs         Reason for call: Pt requesting the following supplements that she takes at home:  1) Multivitamin   2) Potassium 99 mg   3) Zinc 22 mg   4) Flaxseed 1200 mg   5) Magnesium 250 mg   6) Cetirizine 10 mg   7) 4 different probiotics   Pt also requesting Senna for every time she takes her Oxycodone which is Q4H.    Verbal Order/Direction given by Provider:   1) Okay for supplements except for Potassium, potassium level was borderline high  2) Okay for Senna Q4H PRN  3) Check the following labs on 3/22: BMP, Mg, Hgb    Provider giving Order:  PRAVEEN Augustin CNP     Verbal Order given to: Sergio Parker RN      "

## 2023-03-22 DIAGNOSIS — S32.501D CLOSED NONDISPLACED FRACTURE OF RIGHT PUBIS WITH ROUTINE HEALING, SUBSEQUENT ENCOUNTER: ICD-10-CM

## 2023-03-22 LAB
ANION GAP SERPL CALCULATED.3IONS-SCNC: 11 MMOL/L (ref 7–15)
BUN SERPL-MCNC: 10.4 MG/DL (ref 8–23)
CALCIUM SERPL-MCNC: 9.2 MG/DL (ref 8.8–10.2)
CHLORIDE SERPL-SCNC: 95 MMOL/L (ref 98–107)
CREAT SERPL-MCNC: 0.65 MG/DL (ref 0.51–0.95)
DEPRECATED HCO3 PLAS-SCNC: 27 MMOL/L (ref 22–29)
GFR SERPL CREATININE-BSD FRML MDRD: >90 ML/MIN/1.73M2
GLUCOSE SERPL-MCNC: 117 MG/DL (ref 70–99)
HGB BLD-MCNC: 11.3 G/DL (ref 11.7–15.7)
MAGNESIUM SERPL-MCNC: 2.3 MG/DL (ref 1.7–2.3)
POTASSIUM SERPL-SCNC: 4.3 MMOL/L (ref 3.4–5.3)
SODIUM SERPL-SCNC: 133 MMOL/L (ref 136–145)

## 2023-03-22 PROCEDURE — 83735 ASSAY OF MAGNESIUM: CPT | Performed by: NURSE PRACTITIONER

## 2023-03-22 PROCEDURE — P9604 ONE-WAY ALLOW PRORATED TRIP: HCPCS | Performed by: NURSE PRACTITIONER

## 2023-03-22 PROCEDURE — 80048 BASIC METABOLIC PNL TOTAL CA: CPT | Performed by: NURSE PRACTITIONER

## 2023-03-22 PROCEDURE — 85018 HEMOGLOBIN: CPT | Performed by: NURSE PRACTITIONER

## 2023-03-22 PROCEDURE — 36415 COLL VENOUS BLD VENIPUNCTURE: CPT | Performed by: NURSE PRACTITIONER

## 2023-03-22 RX ORDER — OXYCODONE HYDROCHLORIDE 5 MG/1
TABLET ORAL
Qty: 120 TABLET | Refills: 0 | Status: SHIPPED | OUTPATIENT
Start: 2023-03-22 | End: 2023-04-04

## 2023-03-23 ENCOUNTER — TRANSITIONAL CARE UNIT VISIT (OUTPATIENT)
Dept: GERIATRICS | Facility: CLINIC | Age: 73
End: 2023-03-23
Payer: MEDICARE

## 2023-03-23 DIAGNOSIS — K59.01 SLOW TRANSIT CONSTIPATION: ICD-10-CM

## 2023-03-23 DIAGNOSIS — I10 ESSENTIAL HYPERTENSION, BENIGN: ICD-10-CM

## 2023-03-23 DIAGNOSIS — G62.2: ICD-10-CM

## 2023-03-23 DIAGNOSIS — I25.10 CAD IN NATIVE ARTERY: ICD-10-CM

## 2023-03-23 DIAGNOSIS — S32.82XS MULTIPLE CLOSED FRACTURES OF PELVIS WITHOUT DISRUPTION OF PELVIC RING, SEQUELA: Primary | ICD-10-CM

## 2023-03-23 PROCEDURE — 99310 SBSQ NF CARE HIGH MDM 45: CPT | Performed by: NURSE PRACTITIONER

## 2023-03-23 RX ORDER — BACLOFEN 10 MG/1
30 TABLET ORAL DAILY PRN
COMMUNITY
End: 2023-04-04

## 2023-03-23 RX ORDER — GABAPENTIN 300 MG/1
300 CAPSULE ORAL
COMMUNITY

## 2023-03-23 RX ORDER — ONDANSETRON 4 MG/1
4 TABLET, FILM COATED ORAL EVERY 6 HOURS PRN
COMMUNITY

## 2023-03-23 RX ORDER — LEVOTHYROXINE SODIUM 75 UG/1
75 TABLET ORAL DAILY
COMMUNITY

## 2023-03-23 RX ORDER — POLYETHYLENE GLYCOL 3350 17 G/17G
17 POWDER, FOR SOLUTION ORAL 2 TIMES DAILY
COMMUNITY
End: 2023-04-04

## 2023-03-23 RX ORDER — SENNOSIDES A AND B 8.6 MG/1
1 TABLET, FILM COATED ORAL EVERY 4 HOURS PRN
COMMUNITY
End: 2023-04-04

## 2023-03-23 RX ORDER — TRAZODONE HYDROCHLORIDE 50 MG/1
50 TABLET, FILM COATED ORAL AT BEDTIME
COMMUNITY
End: 2023-04-06

## 2023-03-23 NOTE — LETTER
3/23/2023        RE: Lissa Lucero  [address]  [place] [place] [facility] [place] [address]        [facility] GERIATRICS    PRIMARY CARE PROVIDER AND CLINIC:  CHIVO MOORE, [address] / [facility] [address]  Chief Complaint   Patient presents with     Hospital F/U      [facility] Medical Record Number:  [id number]  Place of Service where encounter took place:  [facility] ([facility]) [[id number]]    Lissa Lucero  is a 73 year old  (1950), admitted to the above facility from  [facility]. Hospital stay 3/12/23 through 3/20/23. Patient with PMH chronic pain, neuropathy, HTN, CAD, anxiety and hypothyroidism presented to the ED with pain after falling on ice. She was admitted with right pelvic fracture. Stay was prolonged due to pain control.     HPI obtained from patient visit, review of nursing home record, discussion with facility staff, and Epic review.     HPI:    Patient has several concerns about her medications and feels that the staff at TCU are messing them up. Upon further discussion, they are following the instructions from the hospital as well as their usual regulations. She says that her gabapentin is wrong, this should be 600/900/900. She is also upset that sometimes they bring her trazodone at 7:30 and sometimes at 10. She wants to take it with her last oxycodone around 10. She is advised that the oxycodone is prn, which makes the timing more difficult to keep consistent. Provider can write an order for them to leave the trazodone in her room so she can self administer when she is ready. She is also very concerned about her constipation. She says that she took golytely in the hospital. This is not typically something we would use in the nursing home, but she is willing to try Miralax along with taking a senna with every oxycodone dose. She brought in several supplements from home. Provider advises that her Mg level is borderline elevated, so would not  "recommend that one. Her potassium was almost above normal in the hospital, and is current middle range now. She is fine holding the potassium supplement as well.   Provider advises her of the typical TCU course and how a pelvic fracture is more difficult to manage as the pain only occurs with movement. She does feel that her pain and mobility have already improved from when she went to the ED.     CODE STATUS/ADVANCE DIRECTIVES DISCUSSION:  CPR/Full code   ALLERGIES:   Allergies   Allergen Reactions     Hydrocodone-Acetaminophen Anxiety     Other reaction(s): Dizziness  Disorientation     Boniva [Ibandronic Acid]      Carbamazepine      Other reaction(s): *Unknown, Ataxia  Other reaction(s): Ataxia     Docosahexaenoic Acid (Dha)      Other reaction(s): Chest Pain  States currently using another brand of Omega 3, which is working well with no side effects.  12/02/16     Ethylhexyl Glycerin      Other reaction(s): Other (See Comments)  boniva caused racing heart, chest pain symptoms  Other reaction(s): Chest Pain, Tachycardia  boniva caused racing heart, chest pain symptoms     Levetiracetam      Other reaction(s): Intolerance-Can't Take  Levetiracetam     Methadone      Other reaction(s): Hallucinations  \"saw bugs\"     Morphine      Penicillins      Serotonin      Sulfa Drugs       PAST MEDICAL HISTORY:   Past Medical History:   Diagnosis Date     Angular cheilitis      Angular cheilitis      Angular cheilitis      Anxiety      Anxiety      Anxiety      Arthritis     OA     Arthritis     OA     Arthritis     OA     Benign essential hypertension 9/7/2018     Chronic back pain      Chronic back pain      Chronic back pain      Coronary artery disease      Coronary artery disease      Coronary artery disease      Disease of thyroid gland      Disease of thyroid gland      Disease of thyroid gland      GERD (gastroesophageal reflux disease)      GERD (gastroesophageal reflux disease)      GERD (gastroesophageal reflux " disease)      High cholesterol      High cholesterol      High cholesterol      History of anesthesia complications     Slow to wake up     History of anesthesia complications     Slow to wake up     History of anesthesia complications     Slow to wake up     Hypertension      Hypertension      Hypertension      IBS (irritable bowel syndrome)      IBS (irritable bowel syndrome)      IBS (irritable bowel syndrome)      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)     per H&P     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)     per H&P     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)     per H&P     PONV (postoperative nausea and vomiting)      PONV (postoperative nausea and vomiting)      PONV (postoperative nausea and vomiting)      Vaginitis      Vaginitis      Vaginitis       PAST SURGICAL HISTORY:   has a past surgical history that includes cataract iol, rt/lt (Bilateral, 2012); YAG Capsulotomy OD (right eye) (Right, 2013); YAG Capsulotomy OS (left eye) (Left, 2013); Eye surgery (04/11/2017); TOTAL ABDOM HYSTERECTOMY; IR Lumbar Epidural Steroid Injection (6/21/2005); IR Lumbar Epidural Steroid Injection (9/13/2005); IR Lumbar Epidural Steroid Injection (9/5/2008); IR Lumbar Epidural Steroid Injection (9/9/2008); Nasal Fracture Surgery; other surgical history (12/4/2008); Foot surgery (Bilateral); other surgical history; Reconstruction Tendon Pulley W/ Tendon / Fascial Graft Of Hand / Finger; carotid endarterectomy (Left, 03/26/2015); other surgical history (Left, 12/21/2015); other surgical history (Right, 1997); Pr Cath Placement & Njx Coronary Art Angio Img S&I (N/A, 1/5/2017); Pr L Hrt Cath W/Njx L Ventriculography Img S&I (N/A, 1/5/2017); Eye surgery; Cardiac catheterization; CORRECT BUNION,LAPIDUS TYPE (Right, 5/19/2017); Hysterectomy total abdominal (1981); Oophorectomy (1992); other surgical history (1995); and Pr Lap,Appendectomy (N/A, 5/3/2018).  FAMILY HISTORY: family history includes Alzheimer Disease in her father;  Dementia in her mother; Diabetes Type 2  in her father; GERD in her brother and sister; Heart Disease in her father; Hypertension in her father and mother; Macular Degeneration (age of onset: 87) in her father; Osteoporosis in her mother.  SOCIAL HISTORY:   reports that she has never smoked. She has never used smokeless tobacco. She reports that she does not drink alcohol and does not use drugs.  Patient's living condition: lives alone    Post Discharge Medication Reconciliation Status:   MED REC REQUIRED  Post Medication Reconciliation Status:  Discharge medications reconciled, continue medications without change         Current Outpatient Medications   Medication Sig     baclofen (LIORESAL) 10 MG tablet Take 30 mg by mouth daily as needed for muscle spasms     gabapentin (NEURONTIN) 300 MG capsule Take 300 mg by mouth 600mg qam and 900mg at noon and qpm     levothyroxine (SYNTHROID/LEVOTHROID) 75 MCG tablet Take 75 mcg by mouth daily     NIFEdipine ER OSMOTIC (PROCARDIA XL) 30 MG 24 hr tablet Take 30 mg by mouth daily     ondansetron (ZOFRAN) 4 MG tablet Take 4 mg by mouth every 6 hours as needed for nausea     oxyCODONE (ROXICODONE) 5 MG tablet 5-10mg by mouth every 4 hours as needed.  Pain rated 1-5 = 5mg and pain rated 6-10 = 10mg     polyethylene glycol (MIRALAX) 17 GM/Dose powder Take 17 g by mouth 2 times daily     senna (SENOKOT) 8.6 MG tablet Take 1 tablet by mouth every 4 hours as needed for constipation     SIMVASTATIN PO Take 20 mg by mouth At Bedtime     traZODone (DESYREL) 50 MG tablet Take 50 mg by mouth At Bedtime     No current facility-administered medications for this visit.       ROS:  10 point ROS of systems including Constitutional, Eyes, Respiratory, Cardiovascular, Gastroenterology, Genitourinary, Integumentary, Musculoskeletal, Psychiatric were all negative except for pertinent positives noted in my HPI.    Exam:  GENERAL APPEARANCE:  Alert, in no distress, oriented  ENT:  Mouth and  posterior oropharynx normal, moist mucous membranes  EYES:  EOM normal, conjunctiva and lids normal  RESP:  no respiratory distress  CV:  no edema  PSYCH:  oriented X 3, affect and mood normal    Lab/Diagnostic data:  Recent labs in [facility] reviewed by me today.     ASSESSMENT/PLAN:  (S32.82XS) Multiple closed fractures of pelvis without disruption of pelvic ring, sequela  (primary encounter diagnosis)  Comment: Pain is reasonably controlled. No s/sx DVT. Goal is to discharge home when PT/OT goals met.  Plan: PT/OT eval and treat, discharge planning per their recommendation. Continue oxycodone prn for pain, monitor pain severity. F/u ortho as scheduled.    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 After visit and prior to note completion provider was able to review chart further. Her gabapentin dose was just recently increased to what she reported it as. It had not been updated in Epic, but there was a telephone visit that patient conducted while in the hospital with her primary clinic that noted this. Her fall occurred a few days later, but was on ice and not associated with dizziness, so the two may not be related. As long as she is under 24 hour supervision, it is safe to try the higher dose.   Plan: Increase gabapentin to 600/900/900    (K59.01) Slow transit constipation  Comment: Due to medications and decreased activity  Plan: Senna q4h prn. Miralax 17g BID.     (I10) Essential hypertension, benign  Comment: Chronic, controlled  Plan: Continue current POC with no changes at this time and adjustments as needed.    (I25.10) CAD in native artery  Comment: Asymptomatic  Plan: Continue current POC with no changes at this time and adjustments as needed.      Orders:  Miralax 17g BID  OK to leave trazodone in room and self administer  Change gabapentin to 600mg/900mg/900mg    Total time spent with patient visit at the skilled nursing facility was 45 min including patient visit and review of past  records.     Electronically signed by:  PRAVEEN Amin CNP                       Sincerely,        PRAVEEN Amin CNP

## 2023-03-23 NOTE — PROGRESS NOTES
[facility] GERIATRICS    PRIMARY CARE PROVIDER AND CLINIC:  CHIVO MOORE, [address] / [facility] [address]  Chief Complaint   Patient presents with     Hospital F/U      [facility] Medical Record Number:  [id number]  Place of Service where encounter took place:  [facility] ([facility]) [[id number]]    Lissa Lucero  is a 73 year old  (1950), admitted to the above facility from  [facility]. Hospital stay 3/12/23 through 3/20/23. Patient with PMH chronic pain, neuropathy, HTN, CAD, anxiety and hypothyroidism presented to the ED with pain after falling on ice. She was admitted with right pelvic fracture. Stay was prolonged due to pain control.     HPI obtained from patient visit, review of nursing home record, discussion with facility staff, and Epic review.     HPI:    Patient has several concerns about her medications and feels that the staff at [facility]CU are messing them up. Upon further discussion, they are following the instructions from the hospital as well as their usual regulations. She says that her gabapentin is wrong, this should be 600/900/900. She is also upset that sometimes they bring her trazodone at 7:30 and sometimes at 10. She wants to take it with her last oxycodone around 10. She is advised that the oxycodone is prn, which makes the timing more difficult to keep consistent. Provider can write an order for them to leave the trazodone in her room so she can self administer when she is ready. She is also very concerned about her constipation. She says that she took golytely in the hospital. This is not typically something we would use in the nursing home, but she is willing to try Miralax along with taking a senna with every oxycodone dose. She brought in several supplements from home. Provider advises that her Mg level is borderline elevated, so would not recommend that one. Her potassium was almost above normal in the hospital, and is current middle range  "now. She is fine holding the potassium supplement as well.   Provider advises her of the typical TCU course and how a pelvic fracture is more difficult to manage as the pain only occurs with movement. She does feel that her pain and mobility have already improved from when she went to the ED.     CODE STATUS/ADVANCE DIRECTIVES DISCUSSION:  CPR/Full code   ALLERGIES:   Allergies   Allergen Reactions     Hydrocodone-Acetaminophen Anxiety     Other reaction(s): Dizziness  Disorientation     Boniva [Ibandronic Acid]      Carbamazepine      Other reaction(s): *Unknown, Ataxia  Other reaction(s): Ataxia     Docosahexaenoic Acid (Dha)      Other reaction(s): Chest Pain  States currently using another brand of Omega 3, which is working well with no side effects.  12/02/16     Ethylhexyl Glycerin      Other reaction(s): Other (See Comments)  boniva caused racing heart, chest pain symptoms  Other reaction(s): Chest Pain, Tachycardia  boniva caused racing heart, chest pain symptoms     Levetiracetam      Other reaction(s): Intolerance-Can't Take  Levetiracetam     Methadone      Other reaction(s): Hallucinations  \"saw bugs\"     Morphine      Penicillins      Serotonin      Sulfa Drugs       PAST MEDICAL HISTORY:   Past Medical History:   Diagnosis Date     Angular cheilitis      Angular cheilitis      Angular cheilitis      Anxiety      Anxiety      Anxiety      Arthritis     OA     Arthritis     OA     Arthritis     OA     Benign essential hypertension 9/7/2018     Chronic back pain      Chronic back pain      Chronic back pain      Coronary artery disease      Coronary artery disease      Coronary artery disease      Disease of thyroid gland      Disease of thyroid gland      Disease of thyroid gland      GERD (gastroesophageal reflux disease)      GERD (gastroesophageal reflux disease)      GERD (gastroesophageal reflux disease)      High cholesterol      High cholesterol      High cholesterol      History of anesthesia " complications     Slow to wake up     History of anesthesia complications     Slow to wake up     History of anesthesia complications     Slow to wake up     Hypertension      Hypertension      Hypertension      IBS (irritable bowel syndrome)      IBS (irritable bowel syndrome)      IBS (irritable bowel syndrome)      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)     per H&P     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)     per H&P     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)     per H&P     PONV (postoperative nausea and vomiting)      PONV (postoperative nausea and vomiting)      PONV (postoperative nausea and vomiting)      Vaginitis      Vaginitis      Vaginitis       PAST SURGICAL HISTORY:   has a past surgical history that includes cataract iol, rt/lt (Bilateral, 2012); YAG Capsulotomy OD (right eye) (Right, 2013); YAG Capsulotomy OS (left eye) (Left, 2013); Eye surgery (04/11/2017); TOTAL ABDOM HYSTERECTOMY; IR Lumbar Epidural Steroid Injection (6/21/2005); IR Lumbar Epidural Steroid Injection (9/13/2005); IR Lumbar Epidural Steroid Injection (9/5/2008); IR Lumbar Epidural Steroid Injection (9/9/2008); Nasal Fracture Surgery; other surgical history (12/4/2008); Foot surgery (Bilateral); other surgical history; Reconstruction Tendon Pulley W/ Tendon / Fascial Graft Of Hand / Finger; carotid endarterectomy (Left, 03/26/2015); other surgical history (Left, 12/21/2015); other surgical history (Right, 1997); Pr Cath Placement & Njx Coronary Art Angio Img S&I (N/A, 1/5/2017); Pr L Hrt Cath W/Njx L Ventriculography Img S&I (N/A, 1/5/2017); Eye surgery; Cardiac catheterization; CORRECT BUNION,LAPIDUS TYPE (Right, 5/19/2017); Hysterectomy total abdominal (1981); Oophorectomy (1992); other surgical history (1995); and Pr Lap,Appendectomy (N/A, 5/3/2018).  FAMILY HISTORY: family history includes Alzheimer Disease in her father; Dementia in her mother; Diabetes Type 2  in her father; GERD in her brother and sister; Heart Disease in  her father; Hypertension in her father and mother; Macular Degeneration (age of onset: 87) in her father; Osteoporosis in her mother.  SOCIAL HISTORY:   reports that she has never smoked. She has never used smokeless tobacco. She reports that she does not drink alcohol and does not use drugs.  Patient's living condition: lives alone    Post Discharge Medication Reconciliation Status:   MED REC REQUIRED  Post Medication Reconciliation Status:  Discharge medications reconciled, continue medications without change         Current Outpatient Medications   Medication Sig     baclofen (LIORESAL) 10 MG tablet Take 30 mg by mouth daily as needed for muscle spasms     gabapentin (NEURONTIN) 300 MG capsule Take 300 mg by mouth 600mg qam and 900mg at noon and qpm     levothyroxine (SYNTHROID/LEVOTHROID) 75 MCG tablet Take 75 mcg by mouth daily     NIFEdipine ER OSMOTIC (PROCARDIA XL) 30 MG 24 hr tablet Take 30 mg by mouth daily     ondansetron (ZOFRAN) 4 MG tablet Take 4 mg by mouth every 6 hours as needed for nausea     oxyCODONE (ROXICODONE) 5 MG tablet 5-10mg by mouth every 4 hours as needed.  Pain rated 1-5 = 5mg and pain rated 6-10 = 10mg     polyethylene glycol (MIRALAX) 17 GM/Dose powder Take 17 g by mouth 2 times daily     senna (SENOKOT) 8.6 MG tablet Take 1 tablet by mouth every 4 hours as needed for constipation     SIMVASTATIN PO Take 20 mg by mouth At Bedtime     traZODone (DESYREL) 50 MG tablet Take 50 mg by mouth At Bedtime     No current facility-administered medications for this visit.       ROS:  10 point ROS of systems including Constitutional, Eyes, Respiratory, Cardiovascular, Gastroenterology, Genitourinary, Integumentary, Musculoskeletal, Psychiatric were all negative except for pertinent positives noted in my HPI.    Exam:  GENERAL APPEARANCE:  Alert, in no distress, oriented  ENT:  Mouth and posterior oropharynx normal, moist mucous membranes  EYES:  EOM normal, conjunctiva and lids normal  RESP:  no  respiratory distress  CV:  no edema  PSYCH:  oriented X 3, affect and mood normal    Lab/Diagnostic data:  Recent labs in [facility] reviewed by me today.     ASSESSMENT/PLAN:  (S32.82XS) Multiple closed fractures of pelvis without disruption of pelvic ring, sequela  (primary encounter diagnosis)  Comment: Pain is reasonably controlled. No s/sx DVT. Goal is to discharge home when PT/OT goals met.  Plan: PT/OT eval and treat, discharge planning per their recommendation. Continue oxycodone prn for pain, monitor pain severity. F/u ortho as scheduled.    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 After visit and prior to note completion provider was able to review chart further. Her gabapentin dose was just recently increased to what she reported it as. It had not been updated in Epic, but there was a telephone visit that patient conducted while in the hospital with her primary clinic that noted this. Her fall occurred a few days later, but was on ice and not associated with dizziness, so the two may not be related. As long as she is under 24 hour supervision, it is safe to try the higher dose.   Plan: Increase gabapentin to 600/900/900    (K59.01) Slow transit constipation  Comment: Due to medications and decreased activity  Plan: Senna q4h prn. Miralax 17g BID.     (I10) Essential hypertension, benign  Comment: Chronic, controlled  Plan: Continue current POC with no changes at this time and adjustments as needed.    (I25.10) CAD in native artery  Comment: Asymptomatic  Plan: Continue current POC with no changes at this time and adjustments as needed.      Orders:  Miralax 17g BID  OK to leave trazodone in room and self administer  Change gabapentin to 600mg/900mg/900mg    Total time spent with patient visit at the skilled nursing facility was 45 min including patient visit and review of past records.     Electronically signed by:  PRAVEEN Amin CNP

## 2023-03-24 RX ORDER — POLYETHYLENE GLYCOL 3350 17 G/17G
17 POWDER, FOR SOLUTION ORAL 2 TIMES DAILY
Qty: 510 G
Start: 2023-03-24 | End: 2023-03-30

## 2023-03-30 ENCOUNTER — TRANSITIONAL CARE UNIT VISIT (OUTPATIENT)
Dept: GERIATRICS | Facility: CLINIC | Age: 73
End: 2023-03-30
Payer: MEDICARE

## 2023-03-30 VITALS
SYSTOLIC BLOOD PRESSURE: 138 MMHG | OXYGEN SATURATION: 98 % | WEIGHT: 125 LBS | HEART RATE: 57 BPM | RESPIRATION RATE: 18 BRPM | HEIGHT: 63 IN | DIASTOLIC BLOOD PRESSURE: 65 MMHG | BODY MASS INDEX: 22.15 KG/M2 | TEMPERATURE: 97.5 F

## 2023-03-30 DIAGNOSIS — I25.10 CAD IN NATIVE ARTERY: ICD-10-CM

## 2023-03-30 DIAGNOSIS — S32.82XS MULTIPLE CLOSED FRACTURES OF PELVIS WITHOUT DISRUPTION OF PELVIC RING, SEQUELA: Primary | ICD-10-CM

## 2023-03-30 DIAGNOSIS — K59.01 SLOW TRANSIT CONSTIPATION: ICD-10-CM

## 2023-03-30 DIAGNOSIS — I10 ESSENTIAL HYPERTENSION, BENIGN: ICD-10-CM

## 2023-03-30 DIAGNOSIS — G62.2: ICD-10-CM

## 2023-03-30 PROCEDURE — 99309 SBSQ NF CARE MODERATE MDM 30: CPT | Performed by: NURSE PRACTITIONER

## 2023-03-30 NOTE — LETTER
"    3/30/2023        RE: Lissa Lucero  [address] [address]        [facility] GERIATRICS    Chief Complaint   Patient presents with     RECHECK     HPI:  Lissa Lucero is a 73 year old  (1950), who is being seen today for an episodic care visit at: [facility] ([facility]) [[id number]]. [facility] stay 3/12/23 through 3/20/23. Patient with PMH chronic pain, neuropathy, HTN, CAD, anxiety and hypothyroidism presented to the ED with pain after falling on ice. She was admitted with right pelvic fracture. Stay was prolonged due to pain control.     Today's concern is:   Patient is doing better in general. She walked at least 150 feet today. Her bowels are moving well. Her only concern is that she feels some of the staff are judgmental of her use of oxycodone. She is still taking 10mg almost every 4 hours. Provider advises her that she should take it during the day for therapy, but may not need it during the night if she is just resting. It is expected that she will be able to wean off of it by discharge. She says that she was planning on this.      Allergies, and PMH/PSH reviewed in EPIC today.  REVIEW OF SYSTEMS:  10 point ROS of systems including Constitutional, Eyes, Respiratory, Cardiovascular, Gastroenterology, Genitourinary, Integumentary, Musculoskeletal, Psychiatric were all negative except for pertinent positives noted in my HPI.    Objective:   /65   Pulse 57   Temp 97.5  F (36.4  C)   Resp 18   Ht 1.6 m (5' 3\")   Wt 56.7 kg (125 lb)   SpO2 98%   BMI 22.14 kg/m    GENERAL APPEARANCE:  Alert, in no distress  EYES:  EOM normal, conjunctiva and lids normal  RESP:  no respiratory distress  PSYCH:  oriented X 3, affect and mood normal      Assessment/Plan:  (S32.82XS) Multiple closed fractures of pelvis without disruption of pelvic ring, sequela  (primary encounter diagnosis)  Comment: Pain controlled. Functional status improving  Plan: PT/OT eval " and treat, discharge planning per their recommendations.     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  Plan: Continue current POC with no changes at this time and adjustments as needed.    (K59.01) Slow transit constipation  Comment: Controlled with current regimen  Plan: Continue Miralax and senna. Monitor bowel function. Adjust medication as clinically indicated.    (I10) Essential hypertension, benign  Comment: Chronic, controlled  Plan: Continue current POC with no changes at this time and adjustments as needed.    (I25.10) CAD in native artery  Comment: Asymptomatic  Plan: Continue secondary prevention      Electronically signed by: PRAVEEN Amin CNP             Sincerely,        PRAVEEN Amin CNP

## 2023-03-30 NOTE — PROGRESS NOTES
"[facility] GERIATRICS    Chief Complaint   Patient presents with     RECHECK     HPI:  Lissa Lucero is a 73 year old  (1950), who is being seen today for an episodic care visit at: [facility] ([facility]) [[id number]]. [facility] stay 3/12/23 through 3/20/23. Patient with PMH chronic pain, neuropathy, HTN, CAD, anxiety and hypothyroidism presented to the ED with pain after falling on ice. She was admitted with right pelvic fracture. Stay was prolonged due to pain control.     Today's concern is:   Patient is doing better in general. She walked at least 150 feet today. Her bowels are moving well. Her only concern is that she feels some of the staff are judgmental of her use of oxycodone. She is still taking 10mg almost every 4 hours. Provider advises her that she should take it during the day for therapy, but may not need it during the night if she is just resting. It is expected that she will be able to wean off of it by discharge. She says that she was planning on this.      Allergies, and PMH/PSH reviewed in EPIC today.  REVIEW OF SYSTEMS:  10 point ROS of systems including Constitutional, Eyes, Respiratory, Cardiovascular, Gastroenterology, Genitourinary, Integumentary, Musculoskeletal, Psychiatric were all negative except for pertinent positives noted in my HPI.    Objective:   /65   Pulse 57   Temp 97.5  F (36.4  C)   Resp 18   Ht 1.6 m (5' 3\")   Wt 56.7 kg (125 lb)   SpO2 98%   BMI 22.14 kg/m    GENERAL APPEARANCE:  Alert, in no distress  EYES:  EOM normal, conjunctiva and lids normal  RESP:  no respiratory distress  PSYCH:  oriented X 3, affect and mood normal      Assessment/Plan:  (S32.82XS) Multiple closed fractures of pelvis without disruption of pelvic ring, sequela  (primary encounter diagnosis)  Comment: Pain controlled. Functional status improving  Plan: PT/OT eval and treat, discharge planning per their recommendations.     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  Plan: Continue current POC with no changes at this time and adjustments as needed.    (K59.01) Slow transit constipation  Comment: Controlled with current regimen  Plan: Continue Miralax and senna. Monitor bowel function. Adjust medication as clinically indicated.    (I10) Essential hypertension, benign  Comment: Chronic, controlled  Plan: Continue current POC with no changes at this time and adjustments as needed.    (I25.10) CAD in native artery  Comment: Asymptomatic  Plan: Continue secondary prevention      Electronically signed by: PRAVEEN Amin CNP

## 2023-04-01 ENCOUNTER — TELEPHONE (OUTPATIENT)
Dept: GERIATRICS | Facility: CLINIC | Age: 73
End: 2023-04-01
Payer: MEDICARE

## 2023-04-01 DIAGNOSIS — R19.7 DIARRHEA, UNSPECIFIED TYPE: Primary | ICD-10-CM

## 2023-04-01 PROCEDURE — 87493 C DIFF AMPLIFIED PROBE: CPT | Performed by: NURSE PRACTITIONER

## 2023-04-01 RX ORDER — LOPERAMIDE HYDROCHLORIDE 2 MG/1
2 TABLET ORAL 4 TIMES DAILY PRN
Start: 2023-04-01

## 2023-04-01 NOTE — TELEPHONE ENCOUNTER
[place] [facility] TELEPHONE ENCOUNTER    Chief Complaint   Patient presents with     Diarrhea       Lissa Lucero is a 73 year old  (1950),Nurse called today to report: increased complaints of diarrhea-- up to 6x today. She has been taking miralax BID with last dose however noted on 3/30/23 per Registered Nurse. Vitals stable. History of constipation with narcotic use.     ASSESSMENT/PLAN      Collect stool sample for C diff    After stool collection, may start imodium 2mg QID PRN    Zofran PRN    Electronically signed by:   PRAVEEN Blake CNP

## 2023-04-02 ENCOUNTER — LAB REQUISITION (OUTPATIENT)
Dept: LAB | Facility: CLINIC | Age: 73
End: 2023-04-02

## 2023-04-02 DIAGNOSIS — R19.7 DIARRHEA, UNSPECIFIED: ICD-10-CM

## 2023-04-02 LAB — C DIFF TOX B STL QL: NEGATIVE

## 2023-04-03 ENCOUNTER — TELEPHONE (OUTPATIENT)
Dept: GERIATRICS | Facility: CLINIC | Age: 73
End: 2023-04-03
Payer: MEDICARE

## 2023-04-04 ENCOUNTER — LAB REQUISITION (OUTPATIENT)
Dept: LAB | Facility: CLINIC | Age: 73
End: 2023-04-04

## 2023-04-04 ENCOUNTER — DISCHARGE SUMMARY NURSING HOME (OUTPATIENT)
Dept: GERIATRICS | Facility: CLINIC | Age: 73
End: 2023-04-04
Payer: MEDICARE

## 2023-04-04 VITALS
HEART RATE: 78 BPM | RESPIRATION RATE: 18 BRPM | HEIGHT: 63 IN | BODY MASS INDEX: 22.15 KG/M2 | DIASTOLIC BLOOD PRESSURE: 72 MMHG | WEIGHT: 125 LBS | SYSTOLIC BLOOD PRESSURE: 162 MMHG | OXYGEN SATURATION: 100 % | TEMPERATURE: 97.1 F

## 2023-04-04 DIAGNOSIS — S32.82XS MULTIPLE CLOSED FRACTURES OF PELVIS WITHOUT DISRUPTION OF PELVIC RING, SEQUELA: Primary | ICD-10-CM

## 2023-04-04 DIAGNOSIS — F33.8 SEASONAL AFFECTIVE DISORDER (H): ICD-10-CM

## 2023-04-04 DIAGNOSIS — G62.2: ICD-10-CM

## 2023-04-04 DIAGNOSIS — I10 ESSENTIAL HYPERTENSION, BENIGN: ICD-10-CM

## 2023-04-04 DIAGNOSIS — J06.9 ACUTE UPPER RESPIRATORY INFECTION, UNSPECIFIED: ICD-10-CM

## 2023-04-04 DIAGNOSIS — M35.00 SICCA SYNDROME (H): ICD-10-CM

## 2023-04-04 DIAGNOSIS — I25.119 CORONARY ARTERY DISEASE INVOLVING NATIVE CORONARY ARTERY OF NATIVE HEART WITH ANGINA PECTORIS (H): ICD-10-CM

## 2023-04-04 DIAGNOSIS — J06.9 VIRAL UPPER RESPIRATORY TRACT INFECTION: ICD-10-CM

## 2023-04-04 PROBLEM — H60.42 CHOLESTEATOMA OF LEFT EXTERNAL EAR: Status: ACTIVE | Noted: 2021-07-22

## 2023-04-04 PROBLEM — M94.9 DISORDER OF BONE AND CARTILAGE: Status: ACTIVE | Noted: 2018-09-07

## 2023-04-04 PROBLEM — S32.301D: Status: ACTIVE | Noted: 2023-03-21

## 2023-04-04 PROBLEM — M19.049 PRIMARY LOCALIZED OSTEOARTHROSIS, HAND: Status: ACTIVE | Noted: 2018-09-07

## 2023-04-04 PROBLEM — M54.81 OCCIPITAL NEURALGIA: Status: ACTIVE | Noted: 2021-02-26

## 2023-04-04 PROBLEM — E78.00 HYPERCHOLESTEROLEMIA: Status: ACTIVE | Noted: 2018-09-07

## 2023-04-04 PROBLEM — G60.9 HEREDITARY AND IDIOPATHIC PERIPHERAL NEUROPATHY: Status: ACTIVE | Noted: 2018-09-07

## 2023-04-04 PROBLEM — L40.9 PSORIASIS: Status: ACTIVE | Noted: 2018-09-07

## 2023-04-04 PROBLEM — H26.9 CATARACT: Status: ACTIVE | Noted: 2018-09-07

## 2023-04-04 PROBLEM — K21.9 ESOPHAGEAL REFLUX: Status: ACTIVE | Noted: 2018-09-07

## 2023-04-04 PROBLEM — M62.830 SPASM OF BACK MUSCLES: Status: ACTIVE | Noted: 2023-01-26

## 2023-04-04 PROBLEM — M89.9 DISORDER OF BONE AND CARTILAGE: Status: ACTIVE | Noted: 2018-09-07

## 2023-04-04 PROBLEM — G52.1 GLOSSOPHARYNGEAL NEURALGIA: Status: ACTIVE | Noted: 2018-09-07

## 2023-04-04 PROBLEM — H90.A32 MIXED CONDUCTIVE AND SENSORINEURAL HEARING LOSS OF LEFT EAR WITH RESTRICTED HEARING OF RIGHT EAR: Status: ACTIVE | Noted: 2021-10-13

## 2023-04-04 PROBLEM — M54.50 LOWER BACK PAIN: Status: ACTIVE | Noted: 2018-09-07

## 2023-04-04 PROBLEM — H90.A21 SENSORINEURAL HEARING LOSS (SNHL) OF RIGHT EAR WITH RESTRICTED HEARING OF LEFT EAR: Status: ACTIVE | Noted: 2021-10-13

## 2023-04-04 PROBLEM — M89.8X1 SCAPULALGIA: Status: ACTIVE | Noted: 2023-01-26

## 2023-04-04 PROBLEM — I25.10 CAD IN NATIVE ARTERY: Status: ACTIVE | Noted: 2017-01-02

## 2023-04-04 PROBLEM — M54.2 NECK PAIN: Status: ACTIVE | Noted: 2023-01-26

## 2023-04-04 PROBLEM — N95.2 POSTMENOPAUSAL ATROPHIC VAGINITIS: Status: ACTIVE | Noted: 2018-09-07

## 2023-04-04 PROCEDURE — 87637 SARSCOV2&INF A&B&RSV AMP PRB: CPT | Performed by: FAMILY MEDICINE

## 2023-04-04 PROCEDURE — 99316 NF DSCHRG MGMT 30 MIN+: CPT | Performed by: NURSE PRACTITIONER

## 2023-04-04 RX ORDER — ACETAMINOPHEN 500 MG
1000 TABLET ORAL 3 TIMES DAILY PRN
COMMUNITY

## 2023-04-04 NOTE — TELEPHONE ENCOUNTER
ON-CALL  GERIATRICS CROSS-COVERAGE NOTE:      Report from RN that the patient's blood pressures have been running high since Saturday, April 1.  Blood pressure is now 183/79.  Patient is in TCU due to multiple fractures of pelvis.  Her pain, according to RN, is rated at 4 out of 10 in severity.    It is difficult to know how much of her high blood pressure is related to pain.    Patient is on a regimen of nifedipine ER 30 mg daily.    Plan:  Extra dose of nifedipine 30 mg x 1 this evening  Optimize pain management  Monitor blood pressure  Update NP in a.m.        Porsche Wilkinson MD

## 2023-04-04 NOTE — PROGRESS NOTES
[facility] TCU DISCHARGE SUMMARY  PATIENT'S NAME: Lissa Lucero : 1950 MRN: [id number] Place of Service where encounter took place:  [facility] ([facility]) [[id number]] PRIMARY CARE PROVIDER AND CLINIC RESPONSIBLE AFTER TRANSFER: CHIVO MOORE, [address] / [place] [address]. [facility] Provider     Transferring providers: Dr. Aure Whitney, APRN CNP DNP.  Recent Hospitalization/ED: [facility] stay 3/12 to 3/20/23.  Date of TCU Admission: 3/20/23.  Date of TCU (anticipated) Discharge: 23.  Discharged to: previous independent home  Cognitive Scores: BIMS: 13/15  Physical Function: Ambulating with 2ww.  DME: None.  CODE STATUS/ADVANCE DIRECTIVES DISCUSSION:  Full Code.  ALLERGIES: Carbamazepine, Clindamycin, Ethylhexyl glycerin, Hydrocodone-acetaminophen, Levetiracetam, Methadone, Morphine, Penicillins, Serotonin, Sulfa drugs, Escitalopram, Docosahexaenoic acid (dha), Fish oil, Gluten meal, Hydrocodone, Ibandronic acid, Lactose, Oat, and Soy allergy    DISCHARGE DIAGNOSIS/NURSING FACILITY COURSE:   Multiple closed fractures of pelvis without disruption of pelvic ring, sequela  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)  Viral upper respiratory tract infection  Essential hypertension, benign  Sicca syndrome (H)  Seasonal affective disorder (H)  Coronary artery disease involving native coronary artery of native heart with angina pectoris (H)     Hospitalization: Senia presented to [facility] ED on 3/12 s/p fall at [facility]. She was found to have a right pubic rami fx. She was supported conservatively an discharged to TCU for conditioning.     Rehab: Senia presented to TCU on 3/20 s/p the above hospitalization. Initially, she had a lot of pain and functional issues, and this affected her balance and tolerance of activity. Her gabapentin was increased and she was given extra bowel meds. She experienced intermittent HTN.    Today, Senia reports a 3-day hx of runny nose, drainage,  nausea, diarrhea. Her rapid COVID test was negative, and she feels like she is improving. She doesn't want to use Oxycodone anymore and has stopped taking this. She would like to have some more Tylenol, and is requesting a trial change in this order below she leaves. She doesn't feel like she needs Baclofen.     Due to her diarrhea, she's held Senna and Miralax for days and she would like these dc'd.     We discussed her high blood pressures, and she truly thinks this is due to anxiety and pain, and just being sick these last few days. She also feels the timing of her medications is incorrect as Synthroid has been given w/ her Nifedipine. She is hopeful that she'll return to her home regimen and then see her BP return to normal. Will order respiratory swab today, but also recommend PCP trend BP reading outpatient.       Past Medical History:  has a past medical history of Angular cheilitis, Angular cheilitis, Angular cheilitis, Anxiety, Anxiety, Anxiety, Arthritis, Arthritis, Arthritis, Benign essential hypertension (9/7/2018), Chronic back pain, Chronic back pain, Chronic back pain, Coronary artery disease, Coronary artery disease, Coronary artery disease, Disease of thyroid gland, Disease of thyroid gland, Disease of thyroid gland, GERD (gastroesophageal reflux disease), GERD (gastroesophageal reflux disease), GERD (gastroesophageal reflux disease), High cholesterol, High cholesterol, High cholesterol, History of anesthesia complications, History of anesthesia complications, History of anesthesia complications, Hypertension, Hypertension, Hypertension, IBS (irritable bowel syndrome), IBS (irritable bowel syndrome), IBS (irritable bowel syndrome), 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), 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), 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), PONV (postoperative nausea and vomiting), PONV (postoperative nausea and vomiting), PONV (postoperative nausea and vomiting), Vaginitis, Vaginitis, and  "Vaginitis.    She has no past medical history of Diabetes (H).  Discharge Medications:  Current Outpatient Medications   Medication Sig Dispense Refill     acetaminophen (TYLENOL) 500 MG tablet Take 1,000 mg by mouth 3 times daily as needed       gabapentin (NEURONTIN) 300 MG capsule Take 300 mg by mouth 600mg qam and 900mg at noon and qpm       levothyroxine (SYNTHROID/LEVOTHROID) 75 MCG tablet Take 75 mcg by mouth daily       loperamide (IMODIUM A-D) 2 MG tablet Take 1 tablet (2 mg) by mouth 4 times daily as needed for diarrhea       NIFEdipine ER OSMOTIC (PROCARDIA XL) 30 MG 24 hr tablet Take 30 mg by mouth daily       ondansetron (ZOFRAN) 4 MG tablet Take 4 mg by mouth every 6 hours as needed for nausea       SIMVASTATIN PO Take 20 mg by mouth At Bedtime       traZODone (DESYREL) 50 MG tablet Take 50 mg by mouth At Bedtime        ROS: 4 point ROS including Respiratory, CV, GI and , other than that noted in the HPI, is negative.    Physical Exam: Vitals: BP (!) 162/72   Pulse 78   Temp 97.1  F (36.2  C)   Resp 18   Ht 1.6 m (5' 3\")   Wt 56.7 kg (125 lb)   SpO2 100%   BMI 22.14 kg/m    GENERAL APPEARANCE: Alert, in no distress, cooperative.   ENT: Mouth/posterior oropharynx intact w/ moist mucous membranes, hearing acuity [place].  EYES: EOM, conjunctivae, lids, pupils and irises normal, PERRL2.   RESP: Respiratory effort good, no respiratory distress, On RA.   CV: Edema 0+ BLE. Peripheral pulses are 2+.  SKIN: Inspection/Palpation of skin and subcutaneous tissue baseline w/ fragility. No wounds/rashes noted.   PSYCH: Insight, judgement, and memory are baseline, affect and mood are happy/engaged.    Facility Labs: Labs done in SNF are in [place] EPIC. Please refer to them using [employer]/Care Everywhere.    DISCHARGE PLAN:    Follow up labs: No labs orders/due except respiratory swab.     Medical Follow Up:  Follow up with primary care provider in 1-4 weeks.    MTM referral needed and placed by this provider: " Yes: per [facility]  Geriatrics Protocol.    Current [place] scheduled appointments: None.  Discharge Services: No therapy or home care recommended.     Orders:  1. MTM Referral x1, routine. Dx: TCU discharge.  2. Change PRN Tylenol to 1000mg PO TID PRN. Dx: pain.  3. Discontinue Baclofen.  4. Discontinue Oxycodone.   5. Discontinue Senna.  6. Discontinue Miralax.  7. COVID/Flu/RSV PCR NP swab x1, asap. Dx: URI.     TOTAL DISCHARGE TIME:   Greater than 30 minutes    Electronically signed by:  Dr. Aure Whitney, APRN CNP DNP

## 2023-04-04 NOTE — LETTER
2023        RE: Lissa Lucero  [address] [address]        [facility] TCU DISCHARGE SUMMARY  PATIENT'S NAME: Lissa Lucero : 1950 MRN: [id number] Place of Service where encounter took place:  [facility] ([facility]) [[id number]] PRIMARY CARE PROVIDER AND CLINIC RESPONSIBLE AFTER TRANSFER: CHIVO MOORE, [address]  [place] [address] / [facility] [address]. [facility] Provider     Transferring providers: Dr. Aure Whitney, APRN CNP DNP.  Recent Hospitalization/ED: [facility] stay 3/12 to 3/20/23.  Date of TCU Admission: 3/20/23.  Date of TCU (anticipated) Discharge: 23.  Discharged to: previous independent home  Cognitive Scores: BIMS: 1315  Physical Function: Ambulating with 2ww.  DME: None.  CODE STATUS/ADVANCE DIRECTIVES DISCUSSION:  Full Code.  ALLERGIES: Carbamazepine, Clindamycin, Ethylhexyl glycerin, Hydrocodone-acetaminophen, Levetiracetam, Methadone, Morphine, Penicillins, Serotonin, Sulfa drugs, Escitalopram, Docosahexaenoic acid (dha), Fish oil, Gluten meal, Hydrocodone, Ibandronic acid, Lactose, Oat, and Soy allergy    DISCHARGE DIAGNOSIS/NURSING FACILITY COURSE:   Multiple closed fractures of pelvis without disruption of pelvic ring, sequela  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)  Viral upper respiratory tract infection  Essential hypertension, benign  Sicca syndrome (H)  Seasonal affective disorder (H)  Coronary artery disease involving native coronary artery of native heart with angina pectoris (H)     Hospitalization: Senia presented to [place] ED on 3/12 s/p fall at [facility]. She was found to have a right pubic rami fx. She was supported conservatively an discharged to TCU for conditioning.     Rehab: Senia presented to TCU on 3/20 s/p the above hospitalization. Initially, she had a lot of pain and functional issues, and this affected her balance and tolerance of activity. Her gabapentin was increased and she was given extra bowel  meds. She experienced intermittent HTN.    Today, Senia reports a 3-day hx of runny nose, drainage, nausea, diarrhea. Her rapid COVID test was negative, and she feels like she is improving. She doesn't want to use Oxycodone anymore and has stopped taking this. She would like to have some more Tylenol, and is requesting a trial change in this order below she leaves. She doesn't feel like she needs Baclofen.     Due to her diarrhea, she's held Senna and Miralax for days and she would like these dc'd.     We discussed her high blood pressures, and she truly thinks this is due to anxiety and pain, and just being sick these last few days. She also feels the timing of her medications is incorrect as Synthroid has been given w/ her Nifedipine. She is hopeful that she'll return to her home regimen and then see her BP return to normal. Will order respiratory swab today, but also recommend PCP trend BP reading outpatient.       Past Medical History:  has a past medical history of Angular cheilitis, Angular cheilitis, Angular cheilitis, Anxiety, Anxiety, Anxiety, Arthritis, Arthritis, Arthritis, Benign essential hypertension (9/7/2018), Chronic back pain, Chronic back pain, Chronic back pain, Coronary artery disease, Coronary artery disease, Coronary artery disease, Disease of thyroid gland, Disease of thyroid gland, Disease of thyroid gland, GERD (gastroesophageal reflux disease), GERD (gastroesophageal reflux disease), GERD (gastroesophageal reflux disease), High cholesterol, High cholesterol, High cholesterol, History of anesthesia complications, History of anesthesia complications, History of anesthesia complications, Hypertension, Hypertension, Hypertension, IBS (irritable bowel syndrome), IBS (irritable bowel syndrome), IBS (irritable bowel syndrome), 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), 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), 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), PONV (postoperative nausea and vomiting), PONV  "(postoperative nausea and vomiting), PONV (postoperative nausea and vomiting), Vaginitis, Vaginitis, and Vaginitis.    She has no past medical history of Diabetes (H).  Discharge Medications:  Current Outpatient Medications   Medication Sig Dispense Refill     acetaminophen (TYLENOL) 500 MG tablet Take 1,000 mg by mouth 3 times daily as needed       gabapentin (NEURONTIN) 300 MG capsule Take 300 mg by mouth 600mg qam and 900mg at noon and qpm       levothyroxine (SYNTHROID/LEVOTHROID) 75 MCG tablet Take 75 mcg by mouth daily       loperamide (IMODIUM A-D) 2 MG tablet Take 1 tablet (2 mg) by mouth 4 times daily as needed for diarrhea       NIFEdipine ER OSMOTIC (PROCARDIA XL) 30 MG 24 hr tablet Take 30 mg by mouth daily       ondansetron (ZOFRAN) 4 MG tablet Take 4 mg by mouth every 6 hours as needed for nausea       SIMVASTATIN PO Take 20 mg by mouth At Bedtime       traZODone (DESYREL) 50 MG tablet Take 50 mg by mouth At Bedtime        ROS: 4 point ROS including Respiratory, CV, GI and , other than that noted in the HPI, is negative.    Physical Exam: Vitals: BP (!) 162/72   Pulse 78   Temp 97.1  F (36.2  C)   Resp 18   Ht 1.6 m (5' 3\")   Wt 56.7 kg (125 lb)   SpO2 100%   BMI 22.14 kg/m    GENERAL APPEARANCE: Alert, in no distress, cooperative.   ENT: Mouth/posterior oropharynx intact w/ moist mucous membranes, hearing acuity [place].  EYES: EOM, conjunctivae, lids, pupils and irises normal, PERRL2.   RESP: Respiratory effort good, no respiratory distress, On RA.   CV: Edema 0+ BLE. Peripheral pulses are 2+.  SKIN: Inspection/Palpation of skin and subcutaneous tissue baseline w/ fragility. No wounds/rashes noted.   PSYCH: Insight, judgement, and memory are baseline, affect and mood are happy/engaged.    Facility Labs: Labs done in SNF are in [place] EPIC. Please refer to them using [employer]/Care Everywhere.    DISCHARGE PLAN:    Follow up labs: No labs orders/due except respiratory swab.     Medical Follow Up:  " Follow up with primary care provider in 1-4 weeks.    MTM referral needed and placed by this provider: Yes: per [facility]  Geriatrics Protocol.    Current [place] scheduled appointments: None.  Discharge Services: No therapy or home care recommended.     Orders:  1. MTM Referral x1, routine. Dx: TCU discharge.  2. Change PRN Tylenol to 1000mg PO TID PRN. Dx: pain.  3. Discontinue Baclofen.  4. Discontinue Oxycodone.   5. Discontinue Senna.  6. Discontinue Miralax.  7. COVID/Flu/RSV PCR NP swab x1, asap. Dx: URI.     TOTAL DISCHARGE TIME:   Greater than 30 minutes    Electronically signed by:  Dr. Aure Whitney, PRAVEEN CNP DNP        Sincerely,        PRAVEEN Lopez CNP

## 2023-04-05 LAB
FLUAV RNA SPEC QL NAA+PROBE: NEGATIVE
FLUBV RNA RESP QL NAA+PROBE: NEGATIVE
RSV RNA SPEC NAA+PROBE: NEGATIVE
SARS-COV-2 RNA RESP QL NAA+PROBE: NEGATIVE

## 2023-04-06 DIAGNOSIS — G47.00 PERSISTENT INSOMNIA: Primary | ICD-10-CM

## 2023-04-06 RX ORDER — TRAZODONE HYDROCHLORIDE 50 MG/1
50 TABLET, FILM COATED ORAL AT BEDTIME
Qty: 30 TABLET | Refills: 0 | Status: SHIPPED | OUTPATIENT
Start: 2023-04-06

## 2023-05-21 ENCOUNTER — APPOINTMENT (OUTPATIENT)
Dept: CT IMAGING | Facility: CLINIC | Age: 73
End: 2023-05-21
Attending: EMERGENCY MEDICINE
Payer: MEDICARE

## 2023-05-21 ENCOUNTER — HOSPITAL ENCOUNTER (EMERGENCY)
Facility: CLINIC | Age: 73
Discharge: HOME | End: 2023-05-21
Attending: EMERGENCY MEDICINE | Admitting: EMERGENCY MEDICINE
Payer: MEDICARE

## 2023-05-21 VITALS
BODY MASS INDEX: 20.55 KG/M2 | DIASTOLIC BLOOD PRESSURE: 69 MMHG | WEIGHT: 116 LBS | TEMPERATURE: 98.2 F | SYSTOLIC BLOOD PRESSURE: 149 MMHG | RESPIRATION RATE: 19 BRPM | OXYGEN SATURATION: 100 % | HEART RATE: 66 BPM

## 2023-05-21 DIAGNOSIS — H92.01 RIGHT EAR PAIN: ICD-10-CM

## 2023-05-21 LAB
ANION GAP SERPL CALCULATED.3IONS-SCNC: 8 MMOL/L (ref 5–18)
BASOPHILS # BLD AUTO: 0.1 10E3/UL (ref 0–0.2)
BASOPHILS NFR BLD AUTO: 1 %
BUN SERPL-MCNC: 12 MG/DL (ref 8–28)
CALCIUM SERPL-MCNC: 10 MG/DL (ref 8.5–10.5)
CHLORIDE BLD-SCNC: 94 MMOL/L (ref 98–107)
CO2 SERPL-SCNC: 28 MMOL/L (ref 22–31)
CREAT SERPL-MCNC: 0.75 MG/DL (ref 0.6–1.1)
EOSINOPHIL # BLD AUTO: 0.1 10E3/UL (ref 0–0.7)
EOSINOPHIL NFR BLD AUTO: 2 %
ERYTHROCYTE [DISTWIDTH] IN BLOOD BY AUTOMATED COUNT: 11.9 % (ref 10–15)
GFR SERPL CREATININE-BSD FRML MDRD: 83 ML/MIN/1.73M2
GLUCOSE BLD-MCNC: 94 MG/DL (ref 70–125)
HCT VFR BLD AUTO: 38.2 % (ref 35–47)
HGB BLD-MCNC: 13.1 G/DL (ref 11.7–15.7)
IMM GRANULOCYTES # BLD: 0 10E3/UL
IMM GRANULOCYTES NFR BLD: 0 %
LYMPHOCYTES # BLD AUTO: 2 10E3/UL (ref 0.8–5.3)
LYMPHOCYTES NFR BLD AUTO: 37 %
MCH RBC QN AUTO: 32.7 PG (ref 26.5–33)
MCHC RBC AUTO-ENTMCNC: 34.3 G/DL (ref 31.5–36.5)
MCV RBC AUTO: 95 FL (ref 78–100)
MONOCYTES # BLD AUTO: 0.4 10E3/UL (ref 0–1.3)
MONOCYTES NFR BLD AUTO: 7 %
NEUTROPHILS # BLD AUTO: 2.8 10E3/UL (ref 1.6–8.3)
NEUTROPHILS NFR BLD AUTO: 53 %
NRBC # BLD AUTO: 0 10E3/UL
NRBC BLD AUTO-RTO: 0 /100
PLATELET # BLD AUTO: 249 10E3/UL (ref 150–450)
POTASSIUM BLD-SCNC: 4 MMOL/L (ref 3.5–5)
RBC # BLD AUTO: 4.01 10E6/UL (ref 3.8–5.2)
SODIUM SERPL-SCNC: 130 MMOL/L (ref 136–145)
WBC # BLD AUTO: 5.3 10E3/UL (ref 4–11)

## 2023-05-21 PROCEDURE — 70481 CT ORBIT/EAR/FOSSA W/DYE: CPT | Mod: MG

## 2023-05-21 PROCEDURE — 80048 BASIC METABOLIC PNL TOTAL CA: CPT | Performed by: EMERGENCY MEDICINE

## 2023-05-21 PROCEDURE — 85025 COMPLETE CBC W/AUTO DIFF WBC: CPT | Performed by: EMERGENCY MEDICINE

## 2023-05-21 PROCEDURE — G1010 CDSM STANSON: HCPCS

## 2023-05-21 PROCEDURE — 99285 EMERGENCY DEPT VISIT HI MDM: CPT | Mod: 25

## 2023-05-21 PROCEDURE — 250N000011 HC RX IP 250 OP 636: Performed by: EMERGENCY MEDICINE

## 2023-05-21 PROCEDURE — 36415 COLL VENOUS BLD VENIPUNCTURE: CPT | Performed by: EMERGENCY MEDICINE

## 2023-05-21 RX ORDER — OXYCODONE HYDROCHLORIDE 5 MG/1
5 TABLET ORAL EVERY 6 HOURS PRN
Qty: 8 TABLET | Refills: 0 | Status: SHIPPED | OUTPATIENT
Start: 2023-05-21 | End: 2023-05-23

## 2023-05-21 RX ORDER — CIPROFLOXACIN AND DEXAMETHASONE 3; 1 MG/ML; MG/ML
4 SUSPENSION/ DROPS AURICULAR (OTIC) 2 TIMES DAILY
Qty: 2.8 ML | Refills: 0 | Status: SHIPPED | OUTPATIENT
Start: 2023-05-23 | End: 2023-05-30

## 2023-05-21 RX ORDER — IOPAMIDOL 755 MG/ML
75 INJECTION, SOLUTION INTRAVASCULAR ONCE
Status: COMPLETED | OUTPATIENT
Start: 2023-05-21 | End: 2023-05-21

## 2023-05-21 RX ADMIN — IOPAMIDOL 75 ML: 755 INJECTION, SOLUTION INTRAVENOUS at 17:35

## 2023-05-21 ASSESSMENT — ACTIVITIES OF DAILY LIVING (ADL)
ADLS_ACUITY_SCORE: 35
ADLS_ACUITY_SCORE: 35
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
## 2023-05-21 NOTE — ED TRIAGE NOTES
Patient presents to the ED with right ear pain for a week. Had some pain and pressure in right ear so she put a Q tip in her ear which caused a scrape inside the ear canal. Saw a ENT PA on Friday who cleaned out her ear and told her to follow up with ENT especially if pain gets worse. Pain has gotten worse but can't get in to see the ENT until August. Went to [facility] urgent care who directed her to the ER for an eval. Patient is concerned for a sinus infection.

## 2023-11-09 DIAGNOSIS — Z12.11 COLON CANCER SCREENING: ICD-10-CM

## 2024-01-20 ENCOUNTER — HEALTH MAINTENANCE LETTER (OUTPATIENT)
Age: 74
End: 2024-01-20

## 2024-01-23 ENCOUNTER — APPOINTMENT (OUTPATIENT)
Dept: URBAN - METROPOLITAN AREA CLINIC 260 | Age: 74
Setting detail: DERMATOLOGY
End: 2024-01-24

## 2024-01-23 VITALS — WEIGHT: 116 LBS | HEIGHT: 63 IN

## 2024-01-23 DIAGNOSIS — Z71.89 OTHER SPECIFIED COUNSELING: ICD-10-CM

## 2024-01-23 DIAGNOSIS — D22 MELANOCYTIC NEVI: ICD-10-CM

## 2024-01-23 DIAGNOSIS — L57.0 ACTINIC KERATOSIS: ICD-10-CM

## 2024-01-23 DIAGNOSIS — L82.1 OTHER SEBORRHEIC KERATOSIS: ICD-10-CM

## 2024-01-23 DIAGNOSIS — L81.4 OTHER MELANIN HYPERPIGMENTATION: ICD-10-CM

## 2024-01-23 DIAGNOSIS — D49.2 NEOPLASM OF UNSPECIFIED BEHAVIOR OF BONE, SOFT TISSUE, AND SKIN: ICD-10-CM

## 2024-01-23 DIAGNOSIS — D18.0 HEMANGIOMA: ICD-10-CM

## 2024-01-23 PROBLEM — D22.5 MELANOCYTIC NEVI OF TRUNK: Status: ACTIVE | Noted: 2024-01-23

## 2024-01-23 PROBLEM — D18.01 HEMANGIOMA OF SKIN AND SUBCUTANEOUS TISSUE: Status: ACTIVE | Noted: 2024-01-23

## 2024-01-23 PROCEDURE — 17000 DESTRUCT PREMALG LESION: CPT | Mod: 59

## 2024-01-23 PROCEDURE — OTHER COUNSELING: OTHER

## 2024-01-23 PROCEDURE — OTHER PHOTO-DOCUMENTATION: OTHER

## 2024-01-23 PROCEDURE — OTHER MIPS QUALITY: OTHER

## 2024-01-23 PROCEDURE — 17003 DESTRUCT PREMALG LES 2-14: CPT

## 2024-01-23 PROCEDURE — OTHER LIQUID NITROGEN: OTHER

## 2024-01-23 PROCEDURE — OTHER BIOPSY BY SHAVE METHOD: OTHER

## 2024-01-23 PROCEDURE — 99213 OFFICE O/P EST LOW 20 MIN: CPT | Mod: 25

## 2024-01-23 PROCEDURE — 11102 TANGNTL BX SKIN SINGLE LES: CPT

## 2024-01-23 ASSESSMENT — LOCATION DETAILED DESCRIPTION DERM
LOCATION DETAILED: INFERIOR THORACIC SPINE
LOCATION DETAILED: RIGHT NASAL ALA
LOCATION DETAILED: SUPERIOR MID FOREHEAD
LOCATION DETAILED: LEFT INFERIOR CENTRAL MALAR CHEEK
LOCATION DETAILED: SUPERIOR LUMBAR SPINE
LOCATION DETAILED: RIGHT SUPERIOR FOREHEAD

## 2024-01-23 ASSESSMENT — LOCATION SIMPLE DESCRIPTION DERM
LOCATION SIMPLE: LEFT CHEEK
LOCATION SIMPLE: UPPER BACK
LOCATION SIMPLE: LOWER BACK
LOCATION SIMPLE: SUPERIOR FOREHEAD
LOCATION SIMPLE: RIGHT FOREHEAD
LOCATION SIMPLE: RIGHT NOSE

## 2024-01-23 ASSESSMENT — LOCATION ZONE DERM
LOCATION ZONE: TRUNK
LOCATION ZONE: FACE
LOCATION ZONE: NOSE

## 2024-01-23 NOTE — PROCEDURE: LIQUID NITROGEN
Show Aperture Variable?: Yes
Consent: The patient's consent was obtained including but not limited to risks of crusting, scabbing, blistering, scarring, darker or lighter pigmentary change, recurrence, incomplete removal and infection.
Number Of Freeze-Thaw Cycles: 3 freeze-thaw cycles
Render Post-Care Instructions In Note?: no
Detail Level: Detailed
Duration Of Freeze Thaw-Cycle (Seconds): 3
Post-Care Instructions: I reviewed with the patient in detail post-care instructions. Patient is to wear sunprotection, and avoid picking at any of the treated lesions. Pt may apply Vaseline to crusted or scabbing areas.

## 2024-01-23 NOTE — PROCEDURE: BIOPSY BY SHAVE METHOD
Detail Level: Detailed
Depth Of Biopsy: dermis
Was A Bandage Applied: Yes
Size Of Lesion In Cm: 0
Biopsy Type: H and E
Biopsy Method: Dermablade
Anesthesia Type: 1% lidocaine with epinephrine
Anesthesia Volume In Cc: 0.3
Hemostasis: Drysol
Wound Care: Petrolatum
Dressing: brown paper tape
Destruction After The Procedure: No
Type Of Destruction Used: Curettage
Curettage Text: The wound bed was treated with curettage after the biopsy was performed.
Cryotherapy Text: The wound bed was treated with cryotherapy after the biopsy was performed.
Electrodesiccation Text: The wound bed was treated with electrodesiccation after the biopsy was performed.
Electrodesiccation And Curettage Text: The wound bed was treated with electrodesiccation and curettage after the biopsy was performed.
Silver Nitrate Text: The wound bed was treated with silver nitrate after the biopsy was performed.
Lab: -[id number]
Path Notes (To The Dermatopathologist): Please confirm margins.
Consent: Written consent was obtained and risks were reviewed including but not limited to scarring, infection, bleeding, scabbing, incomplete removal, nerve damage and allergy to anesthesia.
Post-Care Instructions: I reviewed with the patient in detail post-care instructions. Patient is to keep the biopsy site dry overnight, and then apply bacitracin twice daily until healed. Patient may apply hydrogen peroxide soaks to remove any crusting.
Notification Instructions: Patient will be notified of biopsy results. However, patient instructed to call the office if not contacted within 2 weeks.
Billing Type: Third-Party Bill
Information: Selecting Yes will display possible errors in your note based on the variables you have selected. This validation is only offered as a suggestion for you. PLEASE NOTE THAT THE VALIDATION TEXT WILL BE REMOVED WHEN YOU FINALIZE YOUR NOTE. IF YOU WANT TO FAX A PRELIMINARY NOTE YOU WILL NEED TO TOGGLE THIS TO 'NO' IF YOU DO NOT WANT IT IN YOUR FAXED NOTE.

## 2024-01-23 NOTE — HPI: FULL BODY SKIN EXAMINATION
What Type Of Note Output Would You Prefer (Optional)?: Standard Output
What Is The Reason For Today's Visit?: Full Body Skin Examination
What Is The Reason For Today's Visit? (Being Monitored For X): concerning skin lesions on an annual basis
Additional History: Family hx of melanoma with brother. No concerns today.

## 2024-05-21 ENCOUNTER — APPOINTMENT (OUTPATIENT)
Dept: URBAN - METROPOLITAN AREA CLINIC 260 | Age: 74
Setting detail: DERMATOLOGY
End: 2024-05-21

## 2024-05-21 VITALS — WEIGHT: 115 LBS | HEIGHT: 63 IN

## 2024-05-21 DIAGNOSIS — L23.7 ALLERGIC CONTACT DERMATITIS DUE TO PLANTS, EXCEPT FOOD: ICD-10-CM

## 2024-05-21 PROCEDURE — 99213 OFFICE O/P EST LOW 20 MIN: CPT

## 2024-05-21 PROCEDURE — OTHER MIPS QUALITY: OTHER

## 2024-05-21 PROCEDURE — OTHER COUNSELING: OTHER

## 2024-05-21 PROCEDURE — OTHER PRESCRIPTION: OTHER

## 2024-05-21 PROCEDURE — OTHER PRESCRIPTION MEDICATION MANAGEMENT: OTHER

## 2024-05-21 RX ORDER — TRIAMCINOLONE ACETONIDE 1 MG/G
0.1% CREAM TOPICAL BID
Qty: 453.6 | Refills: 3 | Status: ERX | COMMUNITY
Start: 2024-05-21

## 2024-05-21 ASSESSMENT — LOCATION ZONE DERM: LOCATION ZONE: LEG

## 2024-05-21 ASSESSMENT — LOCATION DETAILED DESCRIPTION DERM: LOCATION DETAILED: LEFT LATERAL ANKLE

## 2024-05-21 ASSESSMENT — LOCATION SIMPLE DESCRIPTION DERM: LOCATION SIMPLE: LEFT LOWER LEG

## 2024-05-21 NOTE — PROCEDURE: PRESCRIPTION MEDICATION MANAGEMENT
Initiate Treatment: Triamcinolone 0.1% cream. Apply to affected area twice daily for up to 3 weeks. Take 1 week off before restarting if needed.
Detail Level: Simple
Render In Strict Bullet Format?: No

## 2024-05-21 NOTE — HPI: RASH
What Type Of Note Output Would You Prefer (Optional)?: Standard Output
How Severe Is Your Rash?: mild
Is This A New Presentation, Or A Follow-Up?: Rash
Additional History: Rash on her lower left leg that is itchy she has not tried any medications to help her symptoms. She noticed the rash came shortly after working in her home garden.

## 2025-01-26 ENCOUNTER — HEALTH MAINTENANCE LETTER (OUTPATIENT)
Age: 75
End: 2025-01-26

## 2025-06-09 ENCOUNTER — PATIENT OUTREACH (OUTPATIENT)
Dept: CARE COORDINATION | Facility: CLINIC | Age: 75
End: 2025-06-09
Payer: MEDICARE

## 2025-06-09 NOTE — TELEPHONE ENCOUNTER
Records Requested   June 9, 2025 9:51 AM   [id number]   Facility  [facility]   Outcome 9:54 am Sent request for imaging to be pushed to PACS. -PATRICIA     Records Requested   June 9, 2025 9:51 AM   [id number]   Facility  [facility]   Outcome 9:55 am Sent request for imaging & notes to be pushed/faxed. -PATRICIA     REASON FOR VISIT: Glossopharyngeal neuralgia   PROVIDER: Agnieszka Peters APRN CNP   DATE OF APPT: 6/18/25   NOTES (FOR ALL VISITS) STATUS DETAILS   OFFICE NOTE from referring provider In process 6/6/25 (Transcribed Orders) Pari Rincon [facility]-C [facility]     OFFICE NOTE from other specialist Care Everywhere 4/24/25, 4/3/25 Mellisa, Maria Luz Portillo MD  @[facility]    3/30/17, 1/26/17, 1/9/17 Linsey Gutiérrez @[facility]     MEDICATION LIST Internal    IMAGING  (FOR ALL VISITS)     ULTRASOUND (CAROTID BILAT) *VASCULAR* In process [facility]*  1/2/25 US Carotid Bilat  1/25/23 US Carotid Bilat    [facility]  6/22/20 US Carotid Bilat     MRI (HEAD, NECK, SPINE) In process [facility]*  11/10/21 MR Cervical Spine     CT (HEAD, NECK, SPINE) In process [facility]*  1/29/25 CT Temporal  2/9/24 CT Temporal  10/11/22 CT Soft Tissue Neck  4/22/22 CT Temporal   2/26/22 CT Cervical Spine  5/5/21 CT Temporal    [facility]  5/21/23 CT Temporal  6/3/20 CT Head  6/3/20 CT Facial Bones

## 2025-06-12 ENCOUNTER — APPOINTMENT (OUTPATIENT)
Dept: URBAN - METROPOLITAN AREA CLINIC 260 | Age: 75
Setting detail: DERMATOLOGY
End: 2025-06-12

## 2025-06-12 DIAGNOSIS — L23.9 ALLERGIC CONTACT DERMATITIS, UNSPECIFIED CAUSE: ICD-10-CM

## 2025-06-12 DIAGNOSIS — L57.0 ACTINIC KERATOSIS: ICD-10-CM

## 2025-06-12 PROCEDURE — OTHER PRESCRIPTION MEDICATION MANAGEMENT: OTHER

## 2025-06-12 PROCEDURE — 99213 OFFICE O/P EST LOW 20 MIN: CPT | Mod: 25

## 2025-06-12 PROCEDURE — 17000 DESTRUCT PREMALG LESION: CPT

## 2025-06-12 PROCEDURE — OTHER LIQUID NITROGEN: OTHER

## 2025-06-12 PROCEDURE — OTHER COUNSELING: OTHER

## 2025-06-12 PROCEDURE — OTHER PRESCRIPTION: OTHER

## 2025-06-12 PROCEDURE — OTHER MIPS QUALITY: OTHER

## 2025-06-12 RX ORDER — TRIAMCINOLONE ACETONIDE 1 MG/G
CREAM TOPICAL
Qty: 80 | Refills: 6 | Status: ERX

## 2025-06-12 ASSESSMENT — LOCATION SIMPLE DESCRIPTION DERM
LOCATION SIMPLE: LEFT LIP
LOCATION SIMPLE: RIGHT ANKLE
LOCATION SIMPLE: LEFT ANKLE

## 2025-06-12 ASSESSMENT — LOCATION ZONE DERM
LOCATION ZONE: LIP
LOCATION ZONE: LEG

## 2025-06-12 ASSESSMENT — LOCATION DETAILED DESCRIPTION DERM
LOCATION DETAILED: LEFT ANKLE
LOCATION DETAILED: RIGHT ANKLE
LOCATION DETAILED: LEFT INFERIOR VERMILION LIP

## 2025-06-18 ENCOUNTER — PRE VISIT (OUTPATIENT)
Dept: NEUROSURGERY | Facility: CLINIC | Age: 75
End: 2025-06-18

## 2025-06-18 ENCOUNTER — OFFICE VISIT (OUTPATIENT)
Dept: NEUROSURGERY | Facility: CLINIC | Age: 75
End: 2025-06-18
Payer: MEDICARE

## 2025-06-18 VITALS
HEART RATE: 50 BPM | RESPIRATION RATE: 16 BRPM | SYSTOLIC BLOOD PRESSURE: 158 MMHG | OXYGEN SATURATION: 98 % | DIASTOLIC BLOOD PRESSURE: 74 MMHG

## 2025-06-18 DIAGNOSIS — G52.1 GLOSSOPHARYNGEAL NEURALGIA: Primary | ICD-10-CM

## 2025-06-18 PROCEDURE — 99204 OFFICE O/P NEW MOD 45 MIN: CPT | Performed by: NURSE PRACTITIONER

## 2025-06-18 PROCEDURE — 3078F DIAST BP <80 MM HG: CPT | Performed by: NURSE PRACTITIONER

## 2025-06-18 PROCEDURE — 3077F SYST BP >= 140 MM HG: CPT | Performed by: NURSE PRACTITIONER

## 2025-06-18 NOTE — NURSING NOTE
Chief Complaint   Patient presents with    New Patient     Here for consult, confirmed with patient     Jenna Worley

## 2025-06-18 NOTE — PATIENT INSTRUCTIONS
MRI Brain as ordered.     Continue Gabapentin at the current dose.     Follow-up with me in clinic after brain MRI.
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
## 2025-07-03 ENCOUNTER — RESULTS FOLLOW-UP (OUTPATIENT)
Dept: NEUROSURGERY | Facility: CLINIC | Age: 75
End: 2025-07-03

## 2025-07-08 ENCOUNTER — OFFICE VISIT (OUTPATIENT)
Dept: NEUROSURGERY | Facility: CLINIC | Age: 75
End: 2025-07-08
Payer: MEDICARE

## 2025-07-08 VITALS
RESPIRATION RATE: 16 BRPM | SYSTOLIC BLOOD PRESSURE: 119 MMHG | DIASTOLIC BLOOD PRESSURE: 65 MMHG | OXYGEN SATURATION: 97 % | HEART RATE: 63 BPM

## 2025-07-08 DIAGNOSIS — G52.1 GLOSSOPHARYNGEAL NEURALGIA: Primary | ICD-10-CM

## 2025-07-08 RX ORDER — OXCARBAZEPINE 150 MG/1
150 TABLET, FILM COATED ORAL 2 TIMES DAILY
Qty: 60 TABLET | Refills: 1 | Status: SHIPPED | OUTPATIENT
Start: 2025-07-08

## 2025-07-08 ASSESSMENT — PAIN SCALES - GENERAL: PAINLEVEL_OUTOF10: SEVERE PAIN (8)
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
## 2025-07-09 ENCOUNTER — TELEPHONE (OUTPATIENT)
Dept: NEUROSURGERY | Facility: CLINIC | Age: 75
End: 2025-07-09
Payer: MEDICARE

## 2025-07-09 DIAGNOSIS — G52.1 GLOSSOPHARYNGEAL NEURALGIA: Primary | ICD-10-CM

## 2025-07-10 ENCOUNTER — HOSPITAL ENCOUNTER (OUTPATIENT)
Dept: CT IMAGING | Facility: CLINIC | Age: 75
End: 2025-07-10
Attending: NEUROLOGICAL SURGERY
Payer: MEDICARE

## 2025-07-10 DIAGNOSIS — G52.1 GLOSSOPHARYNGEAL NEURALGIA: ICD-10-CM

## 2025-07-10 LAB
CREAT BLD-MCNC: 0.7 MG/DL (ref 0.5–1)
EGFRCR SERPLBLD CKD-EPI 2021: >60 ML/MIN/1.73M2

## 2025-07-10 PROCEDURE — 82565 ASSAY OF CREATININE: CPT

## 2025-07-10 PROCEDURE — 250N000011 HC RX IP 250 OP 636: Performed by: NEUROLOGICAL SURGERY

## 2025-07-10 PROCEDURE — 70491 CT SOFT TISSUE NECK W/DYE: CPT

## 2025-07-10 RX ORDER — IOPAMIDOL 755 MG/ML
100 INJECTION, SOLUTION INTRAVASCULAR ONCE
Status: COMPLETED | OUTPATIENT
Start: 2025-07-10 | End: 2025-07-10

## 2025-07-10 RX ADMIN — IOPAMIDOL 100 ML: 755 INJECTION, SOLUTION INTRAVENOUS at 13:34

## 2025-07-10 NOTE — PATIENT INSTRUCTIONS
CT Head and Neck without contrast to evaluate for any masses, tumor within the head and neck.   Referred to the Complex [facility] Pain Clinic for an In person visit. You will be called to schedule the appointment.  Start Trileptal 150mg one tablet twice daily. Medication order sent to your pharmacy.    Call Addis PHILLIP  Neurosurgery Care Coordinator with questions/concerns     Thank you for using [facility]

## 2025-07-11 ENCOUNTER — MYC MEDICAL ADVICE (OUTPATIENT)
Dept: NEUROSURGERY | Facility: CLINIC | Age: 75
End: 2025-07-11
Payer: MEDICARE

## 2025-08-18 ENCOUNTER — OFFICE VISIT (OUTPATIENT)
Dept: NEUROSURGERY | Facility: CLINIC | Age: 75
End: 2025-08-18
Payer: MEDICARE

## 2025-08-18 VITALS
HEART RATE: 62 BPM | WEIGHT: 103.6 LBS | OXYGEN SATURATION: 96 % | DIASTOLIC BLOOD PRESSURE: 72 MMHG | SYSTOLIC BLOOD PRESSURE: 139 MMHG | HEIGHT: 63 IN | RESPIRATION RATE: 16 BRPM | BODY MASS INDEX: 18.36 KG/M2

## 2025-08-18 DIAGNOSIS — R51.9 FACIAL PAIN: Primary | ICD-10-CM

## 2025-08-18 PROCEDURE — 99212 OFFICE O/P EST SF 10 MIN: CPT | Performed by: NEUROLOGICAL SURGERY

## 2025-08-18 PROCEDURE — 3078F DIAST BP <80 MM HG: CPT | Performed by: NEUROLOGICAL SURGERY

## 2025-08-18 PROCEDURE — 99207 PR NO CHARGE LOS: CPT | Performed by: PSYCHIATRY & NEUROLOGY

## 2025-08-18 PROCEDURE — 3075F SYST BP GE 130 - 139MM HG: CPT | Performed by: NEUROLOGICAL SURGERY

## 2025-08-18 PROCEDURE — 1125F AMNT PAIN NOTED PAIN PRSNT: CPT | Performed by: NEUROLOGICAL SURGERY

## 2025-08-18 RX ORDER — BENZONATATE 200 MG/1
200 CAPSULE ORAL 3 TIMES DAILY PRN
COMMUNITY
Start: 2025-08-14

## 2025-08-18 RX ORDER — TRIAMCINOLONE ACETONIDE 1 MG/G
CREAM TOPICAL
COMMUNITY
Start: 2025-06-12

## 2025-08-18 RX ORDER — LIDOCAINE HYDROCHLORIDE 20 MG/ML
SOLUTION OROPHARYNGEAL
COMMUNITY
Start: 2025-08-14

## 2025-08-18 RX ORDER — PREDNISONE 10 MG/1
TABLET ORAL
COMMUNITY
Start: 2025-08-14 | End: 2025-08-19

## 2025-08-18 RX ORDER — BACLOFEN 10 MG/1
10 TABLET ORAL 3 TIMES DAILY PRN
COMMUNITY
Start: 2025-07-07

## 2025-08-18 ASSESSMENT — PAIN SCALES - GENERAL: PAINLEVEL_OUTOF10: MILD PAIN (3)
